# Patient Record
Sex: FEMALE | Race: WHITE | Employment: UNEMPLOYED | URBAN - METROPOLITAN AREA
[De-identification: names, ages, dates, MRNs, and addresses within clinical notes are randomized per-mention and may not be internally consistent; named-entity substitution may affect disease eponyms.]

---

## 2017-08-18 ENCOUNTER — HOSPITAL ENCOUNTER (EMERGENCY)
Age: 27
Discharge: HOME | End: 2017-08-18
Attending: EMERGENCY MEDICINE
Payer: COMMERCIAL

## 2017-08-18 ENCOUNTER — APPOINTMENT (OUTPATIENT)
Dept: GENERAL RADIOLOGY | Age: 27
End: 2017-08-18
Attending: EMERGENCY MEDICINE
Payer: COMMERCIAL

## 2017-08-18 VITALS
SYSTOLIC BLOOD PRESSURE: 144 MMHG | TEMPERATURE: 98.8 F | RESPIRATION RATE: 37 BRPM | WEIGHT: 273 LBS | BODY MASS INDEX: 42.85 KG/M2 | OXYGEN SATURATION: 98 % | DIASTOLIC BLOOD PRESSURE: 82 MMHG | HEART RATE: 66 BPM | HEIGHT: 67 IN

## 2017-08-18 DIAGNOSIS — R68.89 SOMATIC COMPLAINTS, MULTIPLE: Primary | ICD-10-CM

## 2017-08-18 LAB
ALBUMIN SERPL-MCNC: 3.3 G/DL (ref 3.5–5)
ALBUMIN/GLOB SERPL: 0.7 {RATIO} (ref 1.2–3.5)
ALP SERPL-CCNC: 118 U/L (ref 50–136)
ALT SERPL-CCNC: 19 U/L (ref 12–65)
ANION GAP SERPL CALC-SCNC: 11 MMOL/L (ref 7–16)
AST SERPL-CCNC: 14 U/L (ref 15–37)
BASOPHILS # BLD: 0 K/UL (ref 0–0.2)
BASOPHILS NFR BLD: 0 % (ref 0–2)
BILIRUB SERPL-MCNC: 0.2 MG/DL (ref 0.2–1.1)
BUN SERPL-MCNC: 10 MG/DL (ref 6–23)
CALCIUM SERPL-MCNC: 9.2 MG/DL (ref 8.3–10.4)
CHLORIDE SERPL-SCNC: 105 MMOL/L (ref 98–107)
CO2 SERPL-SCNC: 26 MMOL/L (ref 21–32)
CREAT SERPL-MCNC: 0.68 MG/DL (ref 0.6–1)
DIFFERENTIAL METHOD BLD: NORMAL
EOSINOPHIL # BLD: 0.1 K/UL (ref 0–0.8)
EOSINOPHIL NFR BLD: 1 % (ref 0.5–7.8)
ERYTHROCYTE [DISTWIDTH] IN BLOOD BY AUTOMATED COUNT: 12.6 % (ref 11.9–14.6)
GLOBULIN SER CALC-MCNC: 4.6 G/DL (ref 2.3–3.5)
GLUCOSE SERPL-MCNC: 93 MG/DL (ref 65–100)
HCG UR QL: NEGATIVE
HCT VFR BLD AUTO: 40.5 % (ref 35.8–46.3)
HGB BLD-MCNC: 13.5 G/DL (ref 11.7–15.4)
IMM GRANULOCYTES # BLD: 0 K/UL (ref 0–0.5)
IMM GRANULOCYTES NFR BLD: 0.2 % (ref 0–5)
LIPASE SERPL-CCNC: 126 U/L (ref 73–393)
LYMPHOCYTES # BLD: 2.6 K/UL (ref 0.5–4.6)
LYMPHOCYTES NFR BLD: 32 % (ref 13–44)
MCH RBC QN AUTO: 28.7 PG (ref 26.1–32.9)
MCHC RBC AUTO-ENTMCNC: 33.3 G/DL (ref 31.4–35)
MCV RBC AUTO: 86.2 FL (ref 79.6–97.8)
MONOCYTES # BLD: 0.7 K/UL (ref 0.1–1.3)
MONOCYTES NFR BLD: 9 % (ref 4–12)
NEUTS SEG # BLD: 4.7 K/UL (ref 1.7–8.2)
NEUTS SEG NFR BLD: 58 % (ref 43–78)
PLATELET # BLD AUTO: 330 K/UL (ref 150–450)
PMV BLD AUTO: 10.8 FL (ref 10.8–14.1)
POTASSIUM SERPL-SCNC: 3.9 MMOL/L (ref 3.5–5.1)
PROT SERPL-MCNC: 7.9 G/DL (ref 6.3–8.2)
RBC # BLD AUTO: 4.7 M/UL (ref 4.05–5.25)
SODIUM SERPL-SCNC: 142 MMOL/L (ref 136–145)
WBC # BLD AUTO: 8 K/UL (ref 4.3–11.1)

## 2017-08-18 PROCEDURE — 83690 ASSAY OF LIPASE: CPT | Performed by: EMERGENCY MEDICINE

## 2017-08-18 PROCEDURE — 81025 URINE PREGNANCY TEST: CPT

## 2017-08-18 PROCEDURE — 99285 EMERGENCY DEPT VISIT HI MDM: CPT | Performed by: EMERGENCY MEDICINE

## 2017-08-18 PROCEDURE — 74011250636 HC RX REV CODE- 250/636: Performed by: EMERGENCY MEDICINE

## 2017-08-18 PROCEDURE — 81003 URINALYSIS AUTO W/O SCOPE: CPT | Performed by: EMERGENCY MEDICINE

## 2017-08-18 PROCEDURE — 96375 TX/PRO/DX INJ NEW DRUG ADDON: CPT | Performed by: EMERGENCY MEDICINE

## 2017-08-18 PROCEDURE — 80053 COMPREHEN METABOLIC PANEL: CPT | Performed by: EMERGENCY MEDICINE

## 2017-08-18 PROCEDURE — 96361 HYDRATE IV INFUSION ADD-ON: CPT | Performed by: EMERGENCY MEDICINE

## 2017-08-18 PROCEDURE — 96374 THER/PROPH/DIAG INJ IV PUSH: CPT | Performed by: EMERGENCY MEDICINE

## 2017-08-18 PROCEDURE — 85025 COMPLETE CBC W/AUTO DIFF WBC: CPT | Performed by: EMERGENCY MEDICINE

## 2017-08-18 PROCEDURE — 71020 XR CHEST PA LAT: CPT

## 2017-08-18 PROCEDURE — 93005 ELECTROCARDIOGRAM TRACING: CPT | Performed by: EMERGENCY MEDICINE

## 2017-08-18 RX ORDER — SODIUM CHLORIDE 9 MG/ML
1000 INJECTION, SOLUTION INTRAVENOUS ONCE
Status: COMPLETED | OUTPATIENT
Start: 2017-08-18 | End: 2017-08-18

## 2017-08-18 RX ORDER — KETOROLAC TROMETHAMINE 30 MG/ML
15 INJECTION, SOLUTION INTRAMUSCULAR; INTRAVENOUS
Status: COMPLETED | OUTPATIENT
Start: 2017-08-18 | End: 2017-08-18

## 2017-08-18 RX ORDER — LORAZEPAM 2 MG/ML
0.5 INJECTION INTRAMUSCULAR
Status: DISCONTINUED | OUTPATIENT
Start: 2017-08-18 | End: 2017-08-18 | Stop reason: HOSPADM

## 2017-08-18 RX ORDER — LORAZEPAM 0.5 MG/1
0.5 TABLET ORAL
Qty: 12 TAB | Refills: 0 | Status: SHIPPED | OUTPATIENT
Start: 2017-08-18 | End: 2018-01-23

## 2017-08-18 RX ORDER — DIPHENHYDRAMINE HYDROCHLORIDE 50 MG/ML
12.5 INJECTION, SOLUTION INTRAMUSCULAR; INTRAVENOUS
Status: COMPLETED | OUTPATIENT
Start: 2017-08-18 | End: 2017-08-18

## 2017-08-18 RX ORDER — PROCHLORPERAZINE EDISYLATE 5 MG/ML
10 INJECTION INTRAMUSCULAR; INTRAVENOUS
Status: COMPLETED | OUTPATIENT
Start: 2017-08-18 | End: 2017-08-18

## 2017-08-18 RX ADMIN — DIPHENHYDRAMINE HYDROCHLORIDE 12.5 MG: 50 INJECTION, SOLUTION INTRAMUSCULAR; INTRAVENOUS at 17:29

## 2017-08-18 RX ADMIN — KETOROLAC TROMETHAMINE 15 MG: 30 INJECTION, SOLUTION INTRAMUSCULAR at 17:29

## 2017-08-18 RX ADMIN — PROCHLORPERAZINE EDISYLATE 10 MG: 5 INJECTION INTRAMUSCULAR; INTRAVENOUS at 17:29

## 2017-08-18 RX ADMIN — SODIUM CHLORIDE 1000 ML: 900 INJECTION, SOLUTION INTRAVENOUS at 17:18

## 2017-08-18 NOTE — ED PROVIDER NOTES
Patient is a 32 y.o. female presenting with abdominal pain. The history is provided by the patient and a relative. Abdominal Pain    This is a new problem. The current episode started more than 1 week ago (>1 month off/on). The problem occurs daily. The problem has not changed since onset. Pain location: upper abdomen, chest and head (not all at same time) The quality of the pain is aching, dull and sharp. The pain is at a severity of 9/10. The pain is severe. Associated symptoms include diarrhea and nausea. Pertinent negatives include no anorexia, no fever, no frequency, no hematuria, no headaches, no arthralgias, no myalgias, no trauma, no chest pain and no back pain. Nothing worsens the pain. The pain is relieved by NSAIDs. The patient's surgical history includes cholecystectomy. Past Medical History:   Diagnosis Date    UTI (urinary tract infection)        No past surgical history on file. Family History:   Problem Relation Age of Onset    Cancer Mother      breast    Kidney Disease Mother      stones    Breast Cancer Mother     Diabetes Father     Arthritis-osteo Father     Thyroid Disease Maternal Grandmother        Social History     Social History    Marital status:      Spouse name: N/A    Number of children: N/A    Years of education: N/A     Occupational History    Not on file. Social History Main Topics    Smoking status: Current Every Day Smoker     Packs/day: 0.50     Types: Cigarettes    Smokeless tobacco: Never Used    Alcohol use No      Comment: occ    Drug use: No    Sexual activity: Yes     Partners: Male     Birth control/ protection: Pill     Other Topics Concern    Not on file     Social History Narrative         ALLERGIES: Latex and Metronidazole    Review of Systems   Constitutional: Negative. Negative for chills and fever. HENT: Negative. Negative for congestion, ear pain, postnasal drip and rhinorrhea.     Eyes: Negative for pain and visual disturbance. Respiratory: Negative for cough and wheezing. Cardiovascular: Negative for chest pain and leg swelling. Gastrointestinal: Positive for abdominal pain, diarrhea and nausea. Negative for abdominal distention and anorexia. Endocrine: Negative. Negative for polydipsia, polyphagia and polyuria. Genitourinary: Negative. Negative for difficulty urinating, flank pain, frequency and hematuria. Musculoskeletal: Negative. Negative for arthralgias, back pain and myalgias. Skin: Negative. Neurological: Negative. Negative for dizziness and headaches. Hematological: Negative. Vitals:    08/18/17 1608   BP: 133/83   Pulse: 96   Resp: 20   Temp: 98.8 °F (37.1 °C)   SpO2: 98%   Weight: 123.8 kg (273 lb)   Height: 5' 7\" (1.702 m)            Physical Exam   Constitutional: She is oriented to person, place, and time. She appears well-developed and well-nourished. Non-toxic appearance. She does not have a sickly appearance. She does not appear ill. No distress. HENT:   Head: Normocephalic and atraumatic. Cardiovascular: Intact distal pulses. Pulmonary/Chest: Effort normal.   Abdominal: Soft. Neurological: She is alert and oriented to person, place, and time. Skin: Skin is warm and dry. Psychiatric: She has a normal mood and affect. Her behavior is normal.   Nursing note and vitals reviewed. MDM  Number of Diagnoses or Management Options  Somatic complaints, multiple: new and requires workup  Diagnosis management comments: Screening studies unremarkable. Patient appears comfortable at this time. Symptoms present for weeks and stable. Suggest outpatient follow up with PCP and can continue \"work up\". Return to ER PRN for any acute changes and/or concerns. I discussed the results of all labs, procedures, radiographs, and treatments with the patient and available family. Treatment plan is agreed upon and the patient is ready for discharge.   Questions about treatment in the ED and differential diagnosis of presenting condition were answered. Patient was given verbal discharge instructions including, but not limited to, importance of returning to the emergency department for any concern of worsening or continued symptoms. Instructions were given to follow up with a primary care provider or specialist within 1-2 days. Adverse effects of medications, if prescribed, were discussed and patient was advised to refrain from significant physical activity until followed up by primary care physician and to not drive or operate heavy machinery after taking any sedating substances.            Amount and/or Complexity of Data Reviewed  Clinical lab tests: ordered and reviewed  Tests in the radiology section of CPT®: ordered and reviewed      ED Course       Procedures

## 2017-08-18 NOTE — ED TRIAGE NOTES
Patient c/o \"shooting, cramping\" LUQ pain daily with nausea x 1 month but reports it has been constant x 1 week. Patient c/o diarrhea x 6 months. Patient denies vomiting. Patient c/o migraine headaches daily x 3 weeks. Patient states she has intermittent right sided chest pain \"randomly throughout the day\" x 2 weeks. Patient states the right sided chest pain improves with Ibuprofen.

## 2017-08-19 LAB
ATRIAL RATE: 63 BPM
CALCULATED P AXIS, ECG09: 29 DEGREES
CALCULATED R AXIS, ECG10: 57 DEGREES
CALCULATED T AXIS, ECG11: 37 DEGREES
DIAGNOSIS, 93000: NORMAL
P-R INTERVAL, ECG05: 142 MS
Q-T INTERVAL, ECG07: 396 MS
QRS DURATION, ECG06: 84 MS
QTC CALCULATION (BEZET), ECG08: 405 MS
VENTRICULAR RATE, ECG03: 63 BPM

## 2018-12-11 ENCOUNTER — APPOINTMENT (OUTPATIENT)
Dept: GENERAL RADIOLOGY | Age: 28
End: 2018-12-11
Attending: EMERGENCY MEDICINE
Payer: OTHER MISCELLANEOUS

## 2018-12-11 ENCOUNTER — HOSPITAL ENCOUNTER (EMERGENCY)
Age: 28
Discharge: HOME | End: 2018-12-11
Attending: EMERGENCY MEDICINE
Payer: OTHER MISCELLANEOUS

## 2018-12-11 VITALS
SYSTOLIC BLOOD PRESSURE: 128 MMHG | RESPIRATION RATE: 16 BRPM | HEART RATE: 86 BPM | OXYGEN SATURATION: 98 % | TEMPERATURE: 98.8 F | DIASTOLIC BLOOD PRESSURE: 78 MMHG

## 2018-12-11 DIAGNOSIS — S82.61XA CLOSED FRACTURE OF DISTAL LATERAL MALLEOLUS OF RIGHT FIBULA, INITIAL ENCOUNTER: ICD-10-CM
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
## 2018-12-11 PROCEDURE — 96374 THER/PROPH/DIAG INJ IV PUSH: CPT | Performed by: NURSE PRACTITIONER

## 2018-12-11 PROCEDURE — 73610 X-RAY EXAM OF ANKLE: CPT

## 2018-12-11 PROCEDURE — 77030008299 HC SPLNT FBRGLS SCTCH 3M -B

## 2018-12-11 PROCEDURE — 77030019945 HC PDNG CST 3M -A

## 2018-12-11 PROCEDURE — 99284 EMERGENCY DEPT VISIT MOD MDM: CPT | Performed by: NURSE PRACTITIONER

## 2018-12-11 PROCEDURE — 75810000053 HC SPLINT APPLICATION: Performed by: NURSE PRACTITIONER

## 2018-12-11 PROCEDURE — 74011250636 HC RX REV CODE- 250/636: Performed by: NURSE PRACTITIONER

## 2018-12-11 RX ORDER — MORPHINE SULFATE 2 MG/ML
4 INJECTION, SOLUTION INTRAMUSCULAR; INTRAVENOUS
Status: COMPLETED | OUTPATIENT
Start: 2018-12-11 | End: 2018-12-11

## 2018-12-11 RX ORDER — HYDROCODONE BITARTRATE AND ACETAMINOPHEN 5; 325 MG/1; MG/1
1 TABLET ORAL
Qty: 20 TAB | Refills: 0 | Status: SHIPPED | OUTPATIENT
Start: 2018-12-11 | End: 2019-02-25

## 2018-12-11 RX ADMIN — MORPHINE SULFATE 4 MG: 2 INJECTION, SOLUTION INTRAMUSCULAR; INTRAVENOUS at 15:09

## 2018-12-11 NOTE — ED TRIAGE NOTES
Pt tripped and fell today and injured right ankle, heard a pop. Pt denies hitting head. Arrives via EMS from work.  Given 15mg toradol IV

## 2018-12-11 NOTE — LETTER
[address] [facility] EMERGENCY DEPT One [address] [id number] [phone] Work/School Note Date: 12/11/2018 To Whom It May concern: 
 
[address] was seen and treated today in the emergency room by the following provider(s): 
Attending Provider: Juan Carlos Laboy MD 
Nurse Practitioner: JESUS Newman. [address] was seen in the Emergency Department 12/11/2018. Patient has to be non weight bearing until evaluated by orthopedics.   
 
Sincerely, 
 
 
 
 
JESUS Davenport

## 2018-12-11 NOTE — ED PROVIDER NOTES
Patient presents with ankle pain and swelling after a fall today while at work. She states she tripped over an uneven walk way. The history is provided by the patient. Ankle Pain    This is a new problem. The current episode started 1 to 2 hours ago. The problem occurs constantly. The problem has not changed since onset. The pain is present in the right ankle. The quality of the pain is described as pounding. The pain is at a severity of 8/10. The pain is moderate. Pertinent negatives include no numbness, no stiffness, no tingling, no itching, no back pain and no neck pain. The symptoms are aggravated by movement. She has tried nothing for the symptoms. There has been a history of trauma.         Past Medical History:   Diagnosis Date    Anxiety     Depression     UTI (urinary tract infection)        Past Surgical History:   Procedure Laterality Date    HX CHOLECYSTECTOMY  2016         Family History:   Problem Relation Age of Onset    Cancer Mother         breast    Kidney Disease Mother         stones    Breast Cancer Mother     Diabetes Father     Arthritis-osteo Father     Thyroid Disease Maternal Grandmother        Social History     Socioeconomic History    Marital status:      Spouse name: Not on file    Number of children: Not on file    Years of education: Not on file    Highest education level: Not on file   Social Needs    Financial resource strain: Not on file    Food insecurity - worry: Not on file    Food insecurity - inability: Not on file   [place] needs - medical: Not on file   [place] needs - non-medical: Not on file   Occupational History    Not on file   Tobacco Use    Smoking status: Current Every Day Smoker     Packs/day: 0.50     Types: Cigarettes    Smokeless tobacco: Never Used   Substance and Sexual Activity    Alcohol use: No     Alcohol/week: 0.0 oz     Comment: occ    Drug use: No    Sexual activity: Yes     Partners: Male     Birth control/protection: Pill   Other Topics Concern    Not on file   Social History Narrative    Not on file         ALLERGIES: Latex; Augmentin [amoxicillin-pot clavulanate]; and Metronidazole    Review of Systems   Musculoskeletal: Positive for arthralgias and joint swelling. Negative for back pain, neck pain and stiffness. Skin: Negative for itching. Neurological: Negative for tingling and numbness. Vitals:    12/11/18 1404   BP: 128/78   Pulse: 100   Resp: 18   Temp: 98.8 °F (37.1 °C)   SpO2: 98%            Physical Exam   Constitutional: She is oriented to person, place, and time. She appears well-developed and well-nourished. No distress. Cardiovascular: Normal rate and regular rhythm. Pulmonary/Chest: Effort normal and breath sounds normal.   Musculoskeletal:        Right knee: Normal.        Right ankle: She exhibits swelling. She exhibits normal pulse. Tenderness. Lateral malleolus tenderness found. Right foot: Normal.   Neurological: She is alert and oriented to person, place, and time. Skin: Skin is warm and dry. She is not diaphoretic. Psychiatric: She has a normal mood and affect. Her behavior is normal.   Nursing note and vitals reviewed. Xr Ankle Rt Min 3 V    Result Date: 12/11/2018  Right ankle  12/11/2018 2:42 PM Indication: Fall today, twisting injury with ankle pain Comparison: None available at this hospital PACS Findings: AP, Lateral, Oblique views are submitted. Bone density is normal. There is soft tissue swelling, best seen laterally. There is a minimally displaced oblique fracture centered at the base of the lateral malleolus. Ankle mortise otherwise appears intact. No other fracture identified. Impression: Minimally displaced oblique fracture at the base of the lateral malleolus with soft tissue swelling.      MDM  Number of Diagnoses or Management Options  Closed fracture of distal lateral malleolus of right fibula, initial encounter:   Fall, initial encounter:   Diagnosis management comments: Splint applied. Patient given total of 4mg of morphine while in the department. Prescription for norco.Discussed patient with . Patient referred to orthopedics. Amount and/or Complexity of Data Reviewed  Tests in the radiology section of CPT®: ordered and reviewed  Discuss the patient with other providers: yes (Zoe)    Patient Progress  Patient progress: stable         Splint, Laxmileonel  Date/Time: 12/11/2018 3:44 PM  Performed by: JESUS Araya  Authorized by: JESUS Araya     Consent:     Consent obtained:  Verbal    Consent given by:  Patient    Risks discussed:  Discoloration, numbness, pain and swelling    Alternatives discussed:  No treatment  Pre-procedure details:     Sensation:  Normal    Skin color:  Appropriate for ethnicity  Procedure details:     Laterality:  Right    Location:  Ankle    Ankle:  R ankle    Strapping: no      Splint type:  Sugar tong (with posterior)    Supplies:  Cotton padding, Ortho-Glass and elastic bandage  Post-procedure details:     Pain:  Unchanged    Sensation:  Normal    Skin color:  Appropriate for ethnicity    Patient tolerance of procedure:   Tolerated well, no immediate complications

## 2018-12-11 NOTE — ED NOTES
I have reviewed discharge instructions with the patient. The patient verbalized understanding. Patient left ED via Discharge Method: wheelchair to Home with . Opportunity for questions and clarification provided. Patient given 1 scripts. To continue your aftercare when you leave the hospital, you may receive an automated call from our care team to check in on how you are doing. This is a free service and part of our promise to provide the best care and service to meet your aftercare needs.  If you have questions, or wish to unsubscribe from this service please call [phone]. Thank you for Choosing our [facility] Emergency Department.

## 2018-12-11 NOTE — DISCHARGE INSTRUCTIONS
Norco as prescribed for pain. Call orthopedics today to schedule a follow up appointment with one of the ankle/foot specialist.   Use your crutches and do not put any weight on your right foot. Elevate and ice your foot. Return to the Emergency Department for any new or worse symptoms.

## 2019-02-25 ENCOUNTER — HOSPITAL ENCOUNTER (EMERGENCY)
Age: 29
Discharge: HOME | End: 2019-02-25
Attending: EMERGENCY MEDICINE
Payer: SELF-PAY

## 2019-02-25 ENCOUNTER — APPOINTMENT (OUTPATIENT)
Dept: CT IMAGING | Age: 29
End: 2019-02-25
Attending: EMERGENCY MEDICINE
Payer: SELF-PAY

## 2019-02-25 VITALS
RESPIRATION RATE: 16 BRPM | WEIGHT: 293 LBS | OXYGEN SATURATION: 100 % | BODY MASS INDEX: 45.99 KG/M2 | SYSTOLIC BLOOD PRESSURE: 114 MMHG | DIASTOLIC BLOOD PRESSURE: 79 MMHG | TEMPERATURE: 98.3 F | HEART RATE: 89 BPM | HEIGHT: 67 IN

## 2019-02-25 DIAGNOSIS — R10.84 ABDOMINAL PAIN, GENERALIZED: Primary | ICD-10-CM

## 2019-02-25 LAB
ALBUMIN SERPL-MCNC: 3.5 G/DL (ref 3.5–5)
ALBUMIN/GLOB SERPL: 0.8 {RATIO} (ref 1.2–3.5)
ALP SERPL-CCNC: 113 U/L (ref 50–136)
ALT SERPL-CCNC: 24 U/L (ref 12–65)
ANION GAP SERPL CALC-SCNC: 9 MMOL/L (ref 7–16)
AST SERPL-CCNC: 22 U/L (ref 15–37)
BASOPHILS # BLD: 0 K/UL (ref 0–0.2)
BASOPHILS NFR BLD: 0 % (ref 0–2)
BILIRUB SERPL-MCNC: 0.3 MG/DL (ref 0.2–1.1)
BUN SERPL-MCNC: 14 MG/DL (ref 6–23)
CALCIUM SERPL-MCNC: 9 MG/DL (ref 8.3–10.4)
CHLORIDE SERPL-SCNC: 106 MMOL/L (ref 98–107)
CO2 SERPL-SCNC: 23 MMOL/L (ref 21–32)
CREAT SERPL-MCNC: 0.74 MG/DL (ref 0.6–1)
DIFFERENTIAL METHOD BLD: ABNORMAL
EOSINOPHIL # BLD: 0.2 K/UL (ref 0–0.8)
EOSINOPHIL NFR BLD: 1 % (ref 0.5–7.8)
ERYTHROCYTE [DISTWIDTH] IN BLOOD BY AUTOMATED COUNT: 12.8 % (ref 11.9–14.6)
GLOBULIN SER CALC-MCNC: 4.3 G/DL (ref 2.3–3.5)
GLUCOSE SERPL-MCNC: 88 MG/DL (ref 65–100)
HCT VFR BLD AUTO: 41.1 % (ref 35.8–46.3)
HGB BLD-MCNC: 13.4 G/DL (ref 11.7–15.4)
IMM GRANULOCYTES # BLD AUTO: 0.1 K/UL (ref 0–0.5)
IMM GRANULOCYTES NFR BLD AUTO: 0 % (ref 0–5)
LIPASE SERPL-CCNC: 138 U/L (ref 73–393)
LYMPHOCYTES # BLD: 3.4 K/UL (ref 0.5–4.6)
LYMPHOCYTES NFR BLD: 28 % (ref 13–44)
MCH RBC QN AUTO: 28.9 PG (ref 26.1–32.9)
MCHC RBC AUTO-ENTMCNC: 32.6 G/DL (ref 31.4–35)
MCV RBC AUTO: 88.8 FL (ref 79.6–97.8)
MONOCYTES # BLD: 1.1 K/UL (ref 0.1–1.3)
MONOCYTES NFR BLD: 9 % (ref 4–12)
NEUTS SEG # BLD: 7.4 K/UL (ref 1.7–8.2)
NEUTS SEG NFR BLD: 61 % (ref 43–78)
NRBC # BLD: 0 K/UL (ref 0–0.2)
PLATELET # BLD AUTO: 336 K/UL (ref 150–450)
PMV BLD AUTO: 10.6 FL (ref 9.4–12.3)
POTASSIUM SERPL-SCNC: 3.8 MMOL/L (ref 3.5–5.1)
PROT SERPL-MCNC: 7.8 G/DL (ref 6.3–8.2)
RBC # BLD AUTO: 4.63 M/UL (ref 4.05–5.2)
SODIUM SERPL-SCNC: 138 MMOL/L (ref 136–145)
WBC # BLD AUTO: 12.1 K/UL (ref 4.3–11.1)

## 2019-02-25 PROCEDURE — 80053 COMPREHEN METABOLIC PANEL: CPT

## 2019-02-25 PROCEDURE — 99284 EMERGENCY DEPT VISIT MOD MDM: CPT | Performed by: EMERGENCY MEDICINE

## 2019-02-25 PROCEDURE — 96375 TX/PRO/DX INJ NEW DRUG ADDON: CPT | Performed by: EMERGENCY MEDICINE

## 2019-02-25 PROCEDURE — 96374 THER/PROPH/DIAG INJ IV PUSH: CPT | Performed by: EMERGENCY MEDICINE

## 2019-02-25 PROCEDURE — 81003 URINALYSIS AUTO W/O SCOPE: CPT | Performed by: EMERGENCY MEDICINE

## 2019-02-25 PROCEDURE — 74177 CT ABD & PELVIS W/CONTRAST: CPT

## 2019-02-25 PROCEDURE — 74011250636 HC RX REV CODE- 250/636: Performed by: EMERGENCY MEDICINE

## 2019-02-25 PROCEDURE — 74011000258 HC RX REV CODE- 258: Performed by: EMERGENCY MEDICINE

## 2019-02-25 PROCEDURE — 85025 COMPLETE CBC W/AUTO DIFF WBC: CPT

## 2019-02-25 PROCEDURE — 83690 ASSAY OF LIPASE: CPT

## 2019-02-25 PROCEDURE — 74011636320 HC RX REV CODE- 636/320: Performed by: EMERGENCY MEDICINE

## 2019-02-25 RX ORDER — TRAMADOL HYDROCHLORIDE 50 MG/1
50 TABLET ORAL
COMMUNITY
End: 2019-04-17

## 2019-02-25 RX ORDER — KETOROLAC TROMETHAMINE 30 MG/ML
30 INJECTION, SOLUTION INTRAMUSCULAR; INTRAVENOUS
Status: COMPLETED | OUTPATIENT
Start: 2019-02-25 | End: 2019-02-25

## 2019-02-25 RX ORDER — ONDANSETRON 2 MG/ML
4 INJECTION INTRAMUSCULAR; INTRAVENOUS
Status: COMPLETED | OUTPATIENT
Start: 2019-02-25 | End: 2019-02-25

## 2019-02-25 RX ORDER — DICLOFENAC POTASSIUM 50 MG/1
50 TABLET, FILM COATED ORAL 3 TIMES DAILY
Qty: 15 TAB | Refills: 0 | Status: SHIPPED | OUTPATIENT
Start: 2019-02-25 | End: 2019-04-03

## 2019-02-25 RX ORDER — SODIUM CHLORIDE 0.9 % (FLUSH) 0.9 %
10 SYRINGE (ML) INJECTION
Status: COMPLETED | OUTPATIENT
Start: 2019-02-25 | End: 2019-02-25

## 2019-02-25 RX ADMIN — ONDANSETRON 4 MG: 2 INJECTION INTRAMUSCULAR; INTRAVENOUS at 19:40

## 2019-02-25 RX ADMIN — SODIUM CHLORIDE 100 ML: 900 INJECTION, SOLUTION INTRAVENOUS at 19:18

## 2019-02-25 RX ADMIN — KETOROLAC TROMETHAMINE 30 MG: 30 INJECTION, SOLUTION INTRAMUSCULAR at 19:40

## 2019-02-25 RX ADMIN — IOPAMIDOL 100 ML: 755 INJECTION, SOLUTION INTRAVENOUS at 19:18

## 2019-02-25 RX ADMIN — SODIUM CHLORIDE 1000 ML: 900 INJECTION, SOLUTION INTRAVENOUS at 19:40

## 2019-02-25 RX ADMIN — Medication 10 ML: at 19:18

## 2019-02-25 NOTE — ED PROVIDER NOTES
Patient presents with right flank pain and right lower quadrant abdominal pain starting intermittent yesterday but constant today. Getting worse so came in. Has some nausea and some diarrhea but no vomiting. No dysuria hematuria. No vaginal bleeding or discharge. The history is provided by the patient. No  was used. Abdominal Pain This is a new problem. The current episode started yesterday. The problem occurs constantly. The problem has been gradually worsening. The pain is associated with an unknown factor. The pain is located in the RLQ. The pain is moderate. Associated symptoms include diarrhea and nausea. Pertinent negatives include no fever, no melena, no vomiting, no constipation, no dysuria, no hematuria, no headaches, no chest pain and no back pain. The pain is worsened by palpation. The pain is relieved by nothing. The patient's surgical history includes cholecystectomy. Past Medical History:  
Diagnosis Date  Anxiety  Depression  UTI (urinary tract infection) Past Surgical History:  
Procedure Laterality Date  HX CHOLECYSTECTOMY  2016 Family History:  
Problem Relation Age of Onset  Cancer Mother   
     breast  
 Kidney Disease Mother   
     stones  Breast Cancer Mother  Diabetes Father  Arthritis-osteo Father  Thyroid Disease Maternal Grandmother Social History Socioeconomic History  Marital status:  Spouse name: Not on file  Number of children: Not on file  Years of education: Not on file  Highest education level: Not on file Social Needs  Financial resource strain: Not on file  Food insecurity - worry: Not on file  Food insecurity - inability: Not on file  Transportation needs - medical: Not on file  Transportation needs - non-medical: Not on file Occupational History  Not on file Tobacco Use  Smoking status: Current Every Day Smoker Packs/day: 0.50 Types: Cigarettes  Smokeless tobacco: Never Used Substance and Sexual Activity  Alcohol use: No  
  Alcohol/week: 0.0 oz  
  Comment: occ  Drug use: No  
 Sexual activity: Yes  
  Partners: Male Birth control/protection: Pill Other Topics Concern  Not on file Social History Narrative  Not on file ALLERGIES: Latex; Augmentin [amoxicillin-pot clavulanate]; and Metronidazole Review of Systems Constitutional: Negative for chills and fever. HENT: Negative for rhinorrhea and sore throat. Eyes: Negative for pain and redness. Respiratory: Negative for chest tightness, shortness of breath and wheezing. Cardiovascular: Negative for chest pain and leg swelling. Gastrointestinal: Positive for abdominal pain, diarrhea and nausea. Negative for constipation, melena and vomiting. Genitourinary: Negative for dysuria, hematuria, vaginal bleeding and vaginal discharge. Musculoskeletal: Negative for back pain, gait problem, neck pain and neck stiffness. Skin: Negative for color change and rash. Neurological: Negative for weakness, numbness and headaches. Vitals:  
 02/25/19 1708 BP: 161/69 Pulse: (!) 116 Resp: 20 Temp: 98.4 °F (36.9 °C) SpO2: 98% Weight: 147.4 kg (325 lb) Height: 5' 7\" (1.702 m) Physical Exam  
Constitutional: She is oriented to person, place, and time. She appears well-developed and well-nourished. HENT:  
Head: Normocephalic and atraumatic. Neck: Normal range of motion. Neck supple. Cardiovascular: Regular rhythm. Tachycardia present. Pulmonary/Chest: Effort normal and breath sounds normal.  
Abdominal: Soft. Bowel sounds are normal. There is tenderness (RLQ and right flank). There is guarding. Musculoskeletal: Normal range of motion. She exhibits no edema. Neurological: She is alert and oriented to person, place, and time. Skin: Skin is warm and dry.   
  
 
MDM 
 Number of Diagnoses or Management Options Diagnosis management comments: Patient with abdominal pain but no acute on CAT scan. We will have her follow up with her OB/GYN and go from there. Amount and/or Complexity of Data Reviewed Clinical lab tests: ordered and reviewed Tests in the radiology section of CPT®: ordered and reviewed Tests in the medicine section of CPT®: ordered and reviewed Patient Progress Patient progress: stable Procedures Results Include: 
 
Recent Results (from the past 24 hour(s)) CBC WITH AUTOMATED DIFF Collection Time: 02/25/19  5:15 PM  
Result Value Ref Range WBC 12.1 (H) 4.3 - 11.1 K/uL  
 RBC 4.63 4.05 - 5.2 M/uL  
 HGB 13.4 11.7 - 15.4 g/dL HCT 41.1 35.8 - 46.3 % MCV 88.8 79.6 - 97.8 FL  
 MCH 28.9 26.1 - 32.9 PG  
 MCHC 32.6 31.4 - 35.0 g/dL  
 RDW 12.8 11.9 - 14.6 % PLATELET 209 133 - 862 K/uL MPV 10.6 9.4 - 12.3 FL ABSOLUTE NRBC 0.00 0.0 - 0.2 K/uL  
 DF AUTOMATED NEUTROPHILS 61 43 - 78 % LYMPHOCYTES 28 13 - 44 % MONOCYTES 9 4.0 - 12.0 % EOSINOPHILS 1 0.5 - 7.8 % BASOPHILS 0 0.0 - 2.0 % IMMATURE GRANULOCYTES 0 0.0 - 5.0 %  
 ABS. NEUTROPHILS 7.4 1.7 - 8.2 K/UL  
 ABS. LYMPHOCYTES 3.4 0.5 - 4.6 K/UL  
 ABS. MONOCYTES 1.1 0.1 - 1.3 K/UL  
 ABS. EOSINOPHILS 0.2 0.0 - 0.8 K/UL  
 ABS. BASOPHILS 0.0 0.0 - 0.2 K/UL  
 ABS. IMM. GRANS. 0.1 0.0 - 0.5 K/UL METABOLIC PANEL, COMPREHENSIVE Collection Time: 02/25/19  5:15 PM  
Result Value Ref Range Sodium 138 136 - 145 mmol/L Potassium 3.8 3.5 - 5.1 mmol/L Chloride 106 98 - 107 mmol/L  
 CO2 23 21 - 32 mmol/L Anion gap 9 7 - 16 mmol/L Glucose 88 65 - 100 mg/dL BUN 14 6 - 23 MG/DL Creatinine 0.74 0.6 - 1.0 MG/DL  
 GFR est AA >60 >60 ml/min/1.73m2 GFR est non-AA >60 >60 ml/min/1.73m2 Calcium 9.0 8.3 - 10.4 MG/DL  Bilirubin, total 0.3 0.2 - 1.1 MG/DL  
 ALT (SGPT) 24 12 - 65 U/L  
 AST (SGOT) 22 15 - 37 U/L  
 Alk. phosphatase 113 50 - 136 U/L Protein, total 7.8 6.3 - 8.2 g/dL Albumin 3.5 3.5 - 5.0 g/dL Globulin 4.3 (H) 2.3 - 3.5 g/dL A-G Ratio 0.8 (L) 1.2 - 3.5 LIPASE Collection Time: 02/25/19  5:15 PM  
Result Value Ref Range Lipase 138 73 - 393 U/L  
 
UA neg. CT ABD PELV W CONT (Final result) Result time 02/25/19 19:30:26 Final result by Will Alvarado MD (02/25/19 19:30:26) Impression:  
 IMPRESSION: 
Status post cholecystectomy. Normal appendix. No acute abnormality. Narrative:  
 HISTORY:  Abdominal pain, RLQ, which started yesterday. Nausea. COMPARISON: None EXAM: CT abdomen and pelvis with iv contrast 
 
TECHNIQUE:  
Thin section axial CT was performed from the lung bases through the symphysis 
pubis during uneventful rapid bolus intravenous administration of 125 mL of 
Isovue 370. Oral contrast was also administered. Radiation dose reduction 
techniques were used for this study.  Our CT scanners use one or all of the 
following: Automated exposure control, adjustment of the mA and/or kV according 
to patient size, use of iterative reconstruction. FINDINGS: 
 
The lung bases are clear. CT abdomen: The liver and spleen enhances homogeneously without discrete 
lesions. There is no biliary ductal dilatation. The patient is status post 
cholecystectomy. The pancreas and adrenal glands are normal. The kidneys enhance 
symmetrically. Bowel loops in the upper abdomen are normal. No definite upper 
abdominal lymphadenopathy seen. CT pelvis: The appendix is normal. The bladder and rectum are normal. No pelvic 
adenopathy seen. No free air or free fluid seen within the pelvis.   
 
Bone window evaluation demonstrates no aggressive osseous lesions.

## 2019-02-25 NOTE — ED TRIAGE NOTES
PT REPORTS RIGHT SIDED ABD PAIN THAT STARTED YESTERDAY - PT REPORTS THAT EVERY TIME SHE TURNS HER BODY TO THE RIGHT MAKES IT WORSE AS WELL AS COUGHING - PT REPORTS THAT TODAY THE PAIN HAS MOVED ACROSS HER STOMACH - PT REPORTS NAUSEA BUT HAS TAKEN PHENERGAN - NO VOMITING - PT REPORTS DIARRHEA ALL DAY TODAY - PT DENIES ANY URIANARY SYMPTOMS

## 2019-02-26 NOTE — DISCHARGE INSTRUCTIONS

## 2019-02-26 NOTE — ED NOTES
I have reviewed discharge instructions with the patient. The patient verbalized understanding. Patient left ED via Discharge Method: ambulatory to Home with self transport. The patient is ambulatory upon exit and appears in no acute distress. The patient has been provided discharge instructions, prescription, and follow up information. The patient and family do not have any questions at this time. Opportunity for questions and clarification provided. Patient given 1 scripts. To continue your aftercare when you leave the hospital, you may receive an automated call from our care team to check in on how you are doing. This is a free service and part of our promise to provide the best care and service to meet your aftercare needs.  If you have questions, or wish to unsubscribe from this service please call [phone]. Thank you for Choosing our [facility] Emergency Department.

## 2019-08-12 ENCOUNTER — HOSPITAL ENCOUNTER (OUTPATIENT)
Dept: GENERAL RADIOLOGY | Age: 29
Discharge: HOME | End: 2019-08-12
Attending: NURSE PRACTITIONER
Payer: COMMERCIAL

## 2019-08-12 DIAGNOSIS — R05.3 PERSISTENT COUGH: ICD-10-CM

## 2019-08-12 PROCEDURE — 71046 X-RAY EXAM CHEST 2 VIEWS: CPT

## 2020-02-25 PROBLEM — O09.891 MEDICATION EXPOSURE DURING FIRST TRIMESTER OF PREGNANCY: Status: ACTIVE | Noted: 2020-02-25

## 2020-02-25 PROBLEM — O99.331 TOBACCO SMOKING AFFECTING PREGNANCY IN FIRST TRIMESTER: Status: ACTIVE | Noted: 2020-02-25

## 2020-02-25 PROBLEM — O99.341 MENTAL DISORDER AFFECTING PREGNANCY IN FIRST TRIMESTER: Status: ACTIVE | Noted: 2020-02-25

## 2020-02-26 PROBLEM — O09.91 HIGH-RISK PREGNANCY IN FIRST TRIMESTER: Status: ACTIVE | Noted: 2020-02-26

## 2020-02-26 PROBLEM — Z36.82 NUCHAL TRANSLUCENCY OF FETUS ON PRENATAL ULTRASOUND: Status: ACTIVE | Noted: 2020-02-26

## 2020-02-26 PROBLEM — O99.211 OBESITY AFFECTING PREGNANCY IN FIRST TRIMESTER: Status: ACTIVE | Noted: 2020-02-26

## 2020-02-26 PROBLEM — O99.511: Status: ACTIVE | Noted: 2020-02-26

## 2020-04-15 PROBLEM — O09.899 RUBELLA NON-IMMUNE STATUS, ANTEPARTUM: Status: ACTIVE | Noted: 2020-04-15

## 2020-04-15 PROBLEM — Z36.82 NUCHAL TRANSLUCENCY OF FETUS ON PRENATAL ULTRASOUND: Status: RESOLVED | Noted: 2020-02-26 | Resolved: 2020-04-15

## 2020-04-15 PROBLEM — O99.342 MENTAL DISORDER AFFECTING PREGNANCY IN SECOND TRIMESTER: Status: ACTIVE | Noted: 2020-02-25

## 2020-04-15 PROBLEM — O09.92 HIGH-RISK PREGNANCY IN SECOND TRIMESTER: Status: ACTIVE | Noted: 2020-02-26

## 2020-04-15 PROBLEM — O10.912 CHRONIC HYPERTENSION COMPLICATING OR REASON FOR CARE DURING PREGNANCY, SECOND TRIMESTER: Status: ACTIVE | Noted: 2020-04-15

## 2020-04-15 PROBLEM — O99.512: Status: ACTIVE | Noted: 2020-02-26

## 2020-04-15 PROBLEM — O99.212 OBESITY AFFECTING PREGNANCY IN SECOND TRIMESTER: Status: ACTIVE | Noted: 2020-02-26

## 2020-04-15 PROBLEM — Z28.39 RUBELLA NON-IMMUNE STATUS, ANTEPARTUM: Status: ACTIVE | Noted: 2020-04-15

## 2020-05-19 PROBLEM — O99.330 MATERNAL TOBACCO USE, ANTEPARTUM: Status: ACTIVE | Noted: 2020-05-19

## 2020-05-27 ENCOUNTER — HOSPITAL ENCOUNTER (OUTPATIENT)
Dept: MAMMOGRAPHY | Age: 30
Discharge: HOME | End: 2020-05-27
Attending: OBSTETRICS & GYNECOLOGY
Payer: COMMERCIAL

## 2020-05-27 DIAGNOSIS — N64.4 BREAST PAIN, RIGHT: ICD-10-CM

## 2020-05-27 PROCEDURE — 76642 ULTRASOUND BREAST LIMITED: CPT

## 2020-06-17 PROBLEM — O99.613 CONSTIPATION DURING PREGNANCY IN THIRD TRIMESTER: Status: ACTIVE | Noted: 2020-06-17

## 2020-06-17 PROBLEM — O09.93 HIGH-RISK PREGNANCY IN THIRD TRIMESTER: Status: ACTIVE | Noted: 2020-02-26

## 2020-06-17 PROBLEM — O10.913 CHRONIC HYPERTENSION COMPLICATING OR REASON FOR CARE DURING PREGNANCY, THIRD TRIMESTER: Status: ACTIVE | Noted: 2020-04-15

## 2020-06-17 PROBLEM — O99.343 MENTAL DISORDER AFFECTING PREGNANCY IN THIRD TRIMESTER: Status: ACTIVE | Noted: 2020-02-25

## 2020-06-17 PROBLEM — K59.00 CONSTIPATION DURING PREGNANCY IN THIRD TRIMESTER: Status: ACTIVE | Noted: 2020-06-17

## 2020-06-17 PROBLEM — O99.213 OBESITY AFFECTING PREGNANCY IN THIRD TRIMESTER: Status: ACTIVE | Noted: 2020-02-26

## 2020-08-14 PROBLEM — O36.5990 SGA (SMALL FOR GESTATIONAL AGE), FETAL, AFFECTING CARE OF MOTHER, ANTEPARTUM: Status: ACTIVE | Noted: 2020-08-14

## 2020-08-27 ENCOUNTER — HOSPITAL ENCOUNTER (INPATIENT)
Age: 30
LOS: 4 days | Discharge: HOME | End: 2020-08-31
Attending: OBSTETRICS & GYNECOLOGY | Admitting: OBSTETRICS & GYNECOLOGY
Payer: COMMERCIAL

## 2020-08-27 DIAGNOSIS — G89.18 POST-OP PAIN: Primary | ICD-10-CM

## 2020-08-27 PROBLEM — O10.919 HTN IN PREGNANCY, CHRONIC: Status: ACTIVE | Noted: 2020-08-27

## 2020-08-27 PROBLEM — Z34.90 ENCOUNTER FOR PLANNED INDUCTION OF LABOR: Status: ACTIVE | Noted: 2020-08-27

## 2020-08-27 PROBLEM — Z3A.38 38 WEEKS GESTATION OF PREGNANCY: Status: ACTIVE | Noted: 2020-08-27

## 2020-08-27 LAB
ABO + RH BLD: NORMAL
BASOPHILS # BLD: 0 K/UL (ref 0–0.2)
BASOPHILS NFR BLD: 0 % (ref 0–2)
BLOOD GROUP ANTIBODIES SERPL: NORMAL
DIFFERENTIAL METHOD BLD: ABNORMAL
EOSINOPHIL # BLD: 0.1 K/UL (ref 0–0.8)
EOSINOPHIL NFR BLD: 1 % (ref 0.5–7.8)
ERYTHROCYTE [DISTWIDTH] IN BLOOD BY AUTOMATED COUNT: 13.2 % (ref 11.9–14.6)
HCT VFR BLD AUTO: 39.5 % (ref 35.8–46.3)
HGB BLD-MCNC: 13.3 G/DL (ref 11.7–15.4)
IMM GRANULOCYTES # BLD AUTO: 0.1 K/UL (ref 0–0.5)
IMM GRANULOCYTES NFR BLD AUTO: 0 % (ref 0–5)
LYMPHOCYTES # BLD: 3.5 K/UL (ref 0.5–4.6)
LYMPHOCYTES NFR BLD: 26 % (ref 13–44)
MCH RBC QN AUTO: 29 PG (ref 26.1–32.9)
MCHC RBC AUTO-ENTMCNC: 33.7 G/DL (ref 31.4–35)
MCV RBC AUTO: 86.1 FL (ref 79.6–97.8)
MONOCYTES # BLD: 1.1 K/UL (ref 0.1–1.3)
MONOCYTES NFR BLD: 8 % (ref 4–12)
NEUTS SEG # BLD: 8.8 K/UL (ref 1.7–8.2)
NEUTS SEG NFR BLD: 65 % (ref 43–78)
NRBC # BLD: 0 K/UL (ref 0–0.2)
PLATELET # BLD AUTO: 261 K/UL (ref 150–450)
PMV BLD AUTO: 12.6 FL (ref 9.4–12.3)
RBC # BLD AUTO: 4.59 M/UL (ref 4.05–5.2)
SPECIMEN EXP DATE BLD: NORMAL
WBC # BLD AUTO: 13.6 K/UL (ref 4.3–11.1)

## 2020-08-27 PROCEDURE — 85025 COMPLETE CBC W/AUTO DIFF WBC: CPT

## 2020-08-27 PROCEDURE — 65270000029 HC RM PRIVATE

## 2020-08-27 PROCEDURE — 86900 BLOOD TYPING SEROLOGIC ABO: CPT

## 2020-08-27 PROCEDURE — 74011250637 HC RX REV CODE- 250/637: Performed by: OBSTETRICS & GYNECOLOGY

## 2020-08-27 PROCEDURE — 36415 COLL VENOUS BLD VENIPUNCTURE: CPT

## 2020-08-27 RX ORDER — LIDOCAINE HYDROCHLORIDE 20 MG/ML
JELLY TOPICAL
Status: ACTIVE | OUTPATIENT
Start: 2020-08-27 | End: 2020-08-28

## 2020-08-27 RX ORDER — SODIUM CHLORIDE 0.9 % (FLUSH) 0.9 %
5-40 SYRINGE (ML) INJECTION AS NEEDED
Status: DISCONTINUED | OUTPATIENT
Start: 2020-08-27 | End: 2020-08-30

## 2020-08-27 RX ORDER — SERTRALINE HYDROCHLORIDE 50 MG/1
50 TABLET, FILM COATED ORAL EVERY EVENING
Status: DISCONTINUED | OUTPATIENT
Start: 2020-08-27 | End: 2020-08-29 | Stop reason: DRUGHIGH

## 2020-08-27 RX ORDER — SODIUM CHLORIDE 0.9 % (FLUSH) 0.9 %
5-40 SYRINGE (ML) INJECTION EVERY 8 HOURS
Status: DISCONTINUED | OUTPATIENT
Start: 2020-08-27 | End: 2020-08-29 | Stop reason: SDUPTHER

## 2020-08-27 RX ORDER — LIDOCAINE HYDROCHLORIDE 10 MG/ML
1 INJECTION INFILTRATION; PERINEURAL
Status: ACTIVE | OUTPATIENT
Start: 2020-08-27 | End: 2020-08-28

## 2020-08-27 RX ORDER — SERTRALINE HYDROCHLORIDE 50 MG/1
100 TABLET, FILM COATED ORAL DAILY
Status: DISCONTINUED | OUTPATIENT
Start: 2020-08-28 | End: 2020-08-30

## 2020-08-27 RX ORDER — SODIUM CHLORIDE 0.9 % (FLUSH) 0.9 %
5-40 SYRINGE (ML) INJECTION EVERY 8 HOURS
Status: DISCONTINUED | OUTPATIENT
Start: 2020-08-27 | End: 2020-08-30

## 2020-08-27 RX ORDER — OXYTOCIN/RINGER'S LACTATE 30/500 ML
250 PLASTIC BAG, INJECTION (ML) INTRAVENOUS ONCE
Status: ACTIVE | OUTPATIENT
Start: 2020-08-27 | End: 2020-08-28

## 2020-08-27 RX ORDER — MINERAL OIL
120 OIL (ML) ORAL
Status: ACTIVE | OUTPATIENT
Start: 2020-08-27 | End: 2020-08-28

## 2020-08-27 RX ORDER — BUSPIRONE HYDROCHLORIDE 5 MG/1
15 TABLET ORAL 2 TIMES DAILY
Status: COMPLETED | OUTPATIENT
Start: 2020-08-27 | End: 2020-08-28

## 2020-08-27 RX ORDER — DEXTROSE, SODIUM CHLORIDE, SODIUM LACTATE, POTASSIUM CHLORIDE, AND CALCIUM CHLORIDE 5; .6; .31; .03; .02 G/100ML; G/100ML; G/100ML; G/100ML; G/100ML
125 INJECTION, SOLUTION INTRAVENOUS CONTINUOUS
Status: DISCONTINUED | OUTPATIENT
Start: 2020-08-27 | End: 2020-08-29

## 2020-08-27 RX ORDER — BUTORPHANOL TARTRATE 2 MG/ML
1 INJECTION INTRAMUSCULAR; INTRAVENOUS
Status: DISCONTINUED | OUTPATIENT
Start: 2020-08-27 | End: 2020-08-29 | Stop reason: HOSPADM

## 2020-08-27 RX ORDER — OXYTOCIN/RINGER'S LACTATE 30/500 ML
0-30 PLASTIC BAG, INJECTION (ML) INTRAVENOUS
Status: DISCONTINUED | OUTPATIENT
Start: 2020-08-27 | End: 2020-08-29 | Stop reason: HOSPADM

## 2020-08-27 RX ADMIN — MISOPROSTOL 50 MCG: 100 TABLET ORAL at 20:13

## 2020-08-27 RX ADMIN — BUSPIRONE HYDROCHLORIDE 15 MG: 5 TABLET ORAL at 21:02

## 2020-08-27 RX ADMIN — SERTRALINE HYDROCHLORIDE 50 MG: 50 TABLET ORAL at 21:02

## 2020-08-27 NOTE — PROGRESS NOTES
Patient ID verified. Allergies, medical history, prenatal record and prior to admission medications verified. Pt instructed to be NPO after midnight. Pt instructed to arrive at hospital @1830,come to entrance C and sign in at the registration desk on the 4th floor. Patient instructed to come to hospital sooner if SROM, labor, or concerning symptoms. Patient verbalized understanding. Questions encouraged and answered.

## 2020-08-28 ENCOUNTER — ANESTHESIA (OUTPATIENT)
Dept: LABOR AND DELIVERY | Age: 30
End: 2020-08-28
Payer: COMMERCIAL

## 2020-08-28 ENCOUNTER — ANESTHESIA EVENT (OUTPATIENT)
Dept: LABOR AND DELIVERY | Age: 30
End: 2020-08-28
Payer: COMMERCIAL

## 2020-08-28 PROCEDURE — 77030040830 HC CATH URETH FOL MDII -A

## 2020-08-28 PROCEDURE — 74011250636 HC RX REV CODE- 250/636: Performed by: ANESTHESIOLOGY

## 2020-08-28 PROCEDURE — A4300 CATH IMPL VASC ACCESS PORTAL: HCPCS | Performed by: ANESTHESIOLOGY

## 2020-08-28 PROCEDURE — 65270000029 HC RM PRIVATE

## 2020-08-28 PROCEDURE — 77030014125 HC TY EPDRL BBMI -B: Performed by: ANESTHESIOLOGY

## 2020-08-28 PROCEDURE — 74011000250 HC RX REV CODE- 250: Performed by: ANESTHESIOLOGY

## 2020-08-28 PROCEDURE — 77030010848 HC CATH INTUTR PRSS KOLB -B

## 2020-08-28 PROCEDURE — 74011250637 HC RX REV CODE- 250/637: Performed by: OBSTETRICS & GYNECOLOGY

## 2020-08-28 PROCEDURE — 74011250636 HC RX REV CODE- 250/636: Performed by: OBSTETRICS & GYNECOLOGY

## 2020-08-28 PROCEDURE — 74011000250 HC RX REV CODE- 250: Performed by: OBSTETRICS & GYNECOLOGY

## 2020-08-28 PROCEDURE — 74011250636 HC RX REV CODE- 250/636: Performed by: NURSE ANESTHETIST, CERTIFIED REGISTERED

## 2020-08-28 RX ORDER — ROPIVACAINE HYDROCHLORIDE 2 MG/ML
INJECTION, SOLUTION EPIDURAL; INFILTRATION; PERINEURAL
Status: DISCONTINUED | OUTPATIENT
Start: 2020-08-28 | End: 2020-08-29 | Stop reason: HOSPADM

## 2020-08-28 RX ORDER — LIDOCAINE HYDROCHLORIDE AND EPINEPHRINE 15; 5 MG/ML; UG/ML
INJECTION, SOLUTION EPIDURAL
Status: COMPLETED | OUTPATIENT
Start: 2020-08-28 | End: 2020-08-28

## 2020-08-28 RX ORDER — LORAZEPAM 0.5 MG/1
1 TABLET ORAL ONCE
Status: DISCONTINUED | OUTPATIENT
Start: 2020-08-28 | End: 2020-08-28 | Stop reason: SDUPTHER

## 2020-08-28 RX ORDER — ONDANSETRON 2 MG/ML
INJECTION INTRAMUSCULAR; INTRAVENOUS
Status: ACTIVE
Start: 2020-08-28 | End: 2020-08-28

## 2020-08-28 RX ORDER — LORAZEPAM 2 MG/ML
1 INJECTION INTRAMUSCULAR
Status: COMPLETED | OUTPATIENT
Start: 2020-08-28 | End: 2020-08-28

## 2020-08-28 RX ORDER — ONDANSETRON 2 MG/ML
4 INJECTION INTRAMUSCULAR; INTRAVENOUS
Status: DISCONTINUED | OUTPATIENT
Start: 2020-08-28 | End: 2020-08-29 | Stop reason: SDUPTHER

## 2020-08-28 RX ORDER — PROMETHAZINE HYDROCHLORIDE 25 MG/ML
25 INJECTION, SOLUTION INTRAMUSCULAR; INTRAVENOUS
Status: DISCONTINUED | OUTPATIENT
Start: 2020-08-28 | End: 2020-08-29

## 2020-08-28 RX ORDER — ONDANSETRON 2 MG/ML
4 INJECTION INTRAMUSCULAR; INTRAVENOUS ONCE
Status: COMPLETED | OUTPATIENT
Start: 2020-08-28 | End: 2020-08-28

## 2020-08-28 RX ORDER — LORAZEPAM 2 MG/ML
INJECTION INTRAMUSCULAR
Status: ACTIVE
Start: 2020-08-28 | End: 2020-08-28

## 2020-08-28 RX ORDER — ROPIVACAINE HYDROCHLORIDE 2 MG/ML
INJECTION, SOLUTION EPIDURAL; INFILTRATION; PERINEURAL AS NEEDED
Status: DISCONTINUED | OUTPATIENT
Start: 2020-08-28 | End: 2020-08-29 | Stop reason: HOSPADM

## 2020-08-28 RX ORDER — LORAZEPAM 2 MG/ML
1 INJECTION INTRAMUSCULAR ONCE
Status: COMPLETED | OUTPATIENT
Start: 2020-08-28 | End: 2020-08-28

## 2020-08-28 RX ADMIN — BUTORPHANOL TARTRATE 1 MG: 2 INJECTION, SOLUTION INTRAMUSCULAR; INTRAVENOUS at 05:11

## 2020-08-28 RX ADMIN — SERTRALINE HYDROCHLORIDE 50 MG: 50 TABLET ORAL at 21:07

## 2020-08-28 RX ADMIN — Medication 5 ML: at 10:57

## 2020-08-28 RX ADMIN — ROPIVACAINE HYDROCHLORIDE 5 ML: 150 INJECTION, SOLUTION EPIDURAL; INFILTRATION; PERINEURAL at 21:16

## 2020-08-28 RX ADMIN — Medication 10 ML/HR: at 10:57

## 2020-08-28 RX ADMIN — Medication 5 ML: at 10:54

## 2020-08-28 RX ADMIN — ONDANSETRON 4 MG: 2 INJECTION INTRAMUSCULAR; INTRAVENOUS at 00:32

## 2020-08-28 RX ADMIN — MISOPROSTOL 50 MCG: 100 TABLET ORAL at 00:10

## 2020-08-28 RX ADMIN — Medication 28 MILLI-UNITS/MIN: at 19:37

## 2020-08-28 RX ADMIN — LIDOCAINE HYDROCHLORIDE,EPINEPHRINE BITARTRATE 3 ML: 15; .005 INJECTION, SOLUTION EPIDURAL; INFILTRATION; INTRACAUDAL; PERINEURAL at 10:49

## 2020-08-28 RX ADMIN — ONDANSETRON 4 MG: 2 INJECTION INTRAMUSCULAR; INTRAVENOUS at 11:54

## 2020-08-28 RX ADMIN — SODIUM CHLORIDE, SODIUM LACTATE, POTASSIUM CHLORIDE, CALCIUM CHLORIDE, AND DEXTROSE MONOHYDRATE 125 ML/HR: 600; 310; 30; 20; 5 INJECTION, SOLUTION INTRAVENOUS at 09:45

## 2020-08-28 RX ADMIN — LORAZEPAM 1 MG: 2 INJECTION INTRAMUSCULAR; INTRAVENOUS at 12:53

## 2020-08-28 RX ADMIN — PROMETHAZINE HYDROCHLORIDE 25 MG: 25 INJECTION INTRAMUSCULAR; INTRAVENOUS at 13:00

## 2020-08-28 RX ADMIN — BUSPIRONE HYDROCHLORIDE 15 MG: 5 TABLET ORAL at 21:07

## 2020-08-28 RX ADMIN — PROMETHAZINE HYDROCHLORIDE 25 MG: 25 INJECTION INTRAMUSCULAR; INTRAVENOUS at 21:08

## 2020-08-28 RX ADMIN — SODIUM CHLORIDE, SODIUM LACTATE, POTASSIUM CHLORIDE, AND CALCIUM CHLORIDE 500 ML: 600; 310; 30; 20 INJECTION, SOLUTION INTRAVENOUS at 10:59

## 2020-08-28 RX ADMIN — Medication 2 MILLI-UNITS/MIN: at 11:30

## 2020-08-28 RX ADMIN — ONDANSETRON 4 MG: 2 INJECTION INTRAMUSCULAR; INTRAVENOUS at 09:38

## 2020-08-28 RX ADMIN — MISOPROSTOL 50 MCG: 100 TABLET ORAL at 04:32

## 2020-08-28 RX ADMIN — BUSPIRONE HYDROCHLORIDE 15 MG: 5 TABLET ORAL at 09:30

## 2020-08-28 RX ADMIN — LORAZEPAM 1 MG: 2 INJECTION INTRAMUSCULAR; INTRAVENOUS at 11:54

## 2020-08-28 RX ADMIN — LIDOCAINE HYDROCHLORIDE,EPINEPHRINE BITARTRATE 3 ML: 15; .005 INJECTION, SOLUTION EPIDURAL; INFILTRATION; INTRACAUDAL; PERINEURAL at 23:28

## 2020-08-28 RX ADMIN — SERTRALINE HYDROCHLORIDE 100 MG: 50 TABLET ORAL at 09:30

## 2020-08-28 NOTE — PROGRESS NOTES
Phone call to Dr. Edilia Tinoco regarding patients regular medications. Per MD to order patients Zoloft 100 mg in AM and 50 mg in PM and Buspar 15 mg BID.  MD also advised of low baseline fetal heart rate, per MD within normal baseline of 110 BPM.

## 2020-08-28 NOTE — ANESTHESIA PROCEDURE NOTES
Epidural Block    Start time: 8/28/2020 10:34 AM  End time: 8/28/2020 10:50 AM  Performed by: Krystina Darby MD  Authorized by: Krystina Darby MD     Pre-Procedure  Indication: at surgeon's request and labor epidural    Preanesthetic Checklist: patient identified, risks and benefits discussed, anesthesia consent, site marked, patient being monitored, timeout performed and anesthesia consent    Timeout Time: 10:34        Epidural:   Patient position:  Seated  Prep region:  Lumbar  Prep: Chlorhexidine    Location:  L3-4    Needle and Epidural Catheter:   Needle Type:  Tuohy  Needle Gauge:  17 G  Injection Technique:  Loss of resistance using saline  Attempts:  2  Catheter Size:  19 G  Catheter at Skin Depth (cm):  14 (MICKIE @ 10 cm )  Events: no blood with aspiration, no cerebrospinal fluid with aspiration, no paresthesia and negative aspiration test    Test Dose:  Negative    Assessment:   Catheter Secured:  Tegaderm and tape  Insertion:  Uncomplicated  Patient tolerance:  Patient tolerated the procedure well with no immediate complications

## 2020-08-28 NOTE — PROGRESS NOTES
Phone call to MD regarding inability in obtaining continuous fetal heart tone tracing due to excessive fetal movement and maternal habitus. Expressed concern with administering next dose of Cytotec 50 mcg due at 0400 with no continuous tracing. Per MD may administer next dose due at 0400 since positive fetal movement noted.

## 2020-08-28 NOTE — H&P
History & Physical    Name: Bill Fierro MRN: [id number]  SSN: [id number]    YOB: 1990  Age: 34 y.o. Sex: female      Subjective:     Estimated Date of Delivery: 20  OB History    Para Term  AB Living   2 0 0 0 1 0   SAB TAB Ectopic Molar Multiple Live Births   1 0 0 0 0 0      # Outcome Date GA Lbr Cash/2nd Weight Sex Delivery Anes PTL Lv   2 Current            1 2009               Ms. Marciano Koch is admitted with pregnancy at 38w6d for induction of labor due to [facility] and morbid obesity with BMI 49.90. Prenatal course was complicated by [facility], morbid obesity, anxiety & psychiatric disorder. .  Please see prenatal records for details.     Past Medical History:   Diagnosis Date    Abnormal weight gain 10/15/2015    Anxiety     Chronic hypertension complicating or reason for care during pregnancy, second trimester 4/15/2020    Depression     Frequent headaches 10/15/2015    Striae purple 10/15/2015     Past Surgical History:   Procedure Laterality Date    HX CHOLECYSTECTOMY      HX WISDOM TEETH EXTRACTION      16-14 years old     Social History     Occupational History    Not on file   Tobacco Use    Smoking status: Current Every Day Smoker     Packs/day: 0.50     Years: 13.00     Pack years: 6.50     Types: Cigarettes    Smokeless tobacco: Never Used   Substance and Sexual Activity    Alcohol use: Not Currently     Alcohol/week: 0.0 standard drinks     Comment: occ    Drug use: Never    Sexual activity: Yes     Partners: Male     Birth control/protection: None     Family History   Problem Relation Age of Onset    Kidney Disease Mother         stones    Breast Cancer Mother     Diabetes Father     Arthritis-osteo Father     Thyroid Disease Maternal Grandmother     Hypertension Neg Hx        Allergies   Allergen Reactions    Latex Rash    Augmentin [Amoxicillin-Pot Clavulanate] Rash    Metronidazole Hives and Itching     Prior to Admission medications Medication Sig Start Date End Date Taking? Authorizing Provider   fexofenadine (ALLEGRA) 60 mg tablet Take 1 Tab by mouth two (2) times a day. 8/18/20  Yes Best Jackman MD   cetirizine (ZYRTEC) 10 mg tablet Take 1 Tab by mouth daily. 8/18/20  Yes Best Jackman MD   ondansetron (ZOFRAN ODT) 4 mg disintegrating tablet Take 1 Tab by mouth every eight (8) hours as needed for Nausea or Vomiting. 8/18/20  Yes Best Jackman MD   acetaminophen (TYLENOL EXTRA STRENGTH) 500 mg tablet Take  by mouth every six (6) hours as needed for Pain. Yes Provider, Historical   calcium carbonate (TUMS) 200 mg calcium (500 mg) chew Take 1 Tab by mouth daily. Yes Provider, Historical   prenatal vits62/FA/om3/dha/epa (PRENATAL GUMMY PO) Take  by mouth. Yes Provider, Historical   sertraline (ZOLOFT) 100 mg tablet TAKE 1 TABLET BY MOUTH EVERY DAY 11/20/17  Yes Oseas Ulrich NP   ALPRAZolam Caroline Bears) 1 mg tablet 4/15 pt states she has been taking once week 8/17/17  Yes Provider, Historical   busPIRone (BUSPAR) 10 mg tablet Take 1 Tab by mouth three (3) times daily. 3/3/17  Yes Oseas Ulrich NP   aspirin/acetaminophen/caffeine (EXCEDRIN MIGRAINE PO) Take  by mouth. Provider, Historical   budesonide-formoterol (SYMBICORT) 160-4.5 mcg/actuation HFAA Take 2 Puffs by inhalation two (2) times a day. 5/9/19   Oseas Ulrich NP   albuterol (PROVENTIL HFA, VENTOLIN HFA, PROAIR HFA) 90 mcg/actuation inhaler Take 2 Puffs by inhalation every six (6) hours as needed for Wheezing. 4/17/19   Lesly Case NP        Review of Systems: A comprehensive review of systems was negative except for that written in the History of Present Illness. Objective:     Vitals:  Vitals:    08/28/20 1200 08/28/20 1208 08/28/20 1209 08/28/20 1225   BP: 131/65 145/81 146/65 137/65   Pulse: 90 93 66 82   Resp:       Temp: 98.3 °F (36.8 °C)           Physical Exam:  Patient without distress.   Heart: Regular rate  Lung: normal respiratory effort  Abdomen: soft, nontender, obese, gravid  Fundus: soft and non tender  Perineum: blood absent, amniotic fluid absent  Cervical Exam: 1 cm dilated    50% effaced    -3 station    Lower Extremities:  - Edema nonpitting  Membranes:  Artificial Rupture of Membranes; Amniotic Fluid: large amount of thin meconium fluid  Fetal Heart Rate: Difficult to trace due to maternal body habitus. Discussed FSE & IUPC in order to be able to proceed with IOL. R/b/a discussed. Pt consented. IUPC attempted w/o AROM and BOW was not ROM'd. AROM then performed with amniohook and meconium fluid noted. Another IUPC obtained and placed successfully. FSE then placed as well. Pt tolerated well. Prenatal Labs:   Lab Results   Component Value Date/Time    ABO/Rh(D) O POSITIVE 08/27/2020 07:01 PM       Impression/Plan:     Principal Problem:    HTN in pregnancy, chronic (8/27/2020)    Active Problems:    38 weeks gestation of pregnancy (8/27/2020)      Body mass index (BMI) of 45.0 to 49.9 in adult [facility]) (8/27/2020)      Encounter for planned induction of labor (8/27/2020)         Plan: Admit for induction of labor. Group B Strep negative. Risks, benefits and alternatives to IOL discussed - pt with medical induction due to [facility]. Pt expressed understanding of these r/b/a and desires to proceed with with IOL.

## 2020-08-28 NOTE — PROGRESS NOTES
Judith Mitten Dr Ephriam Buerger at bedside at 56. Assisted pt to sitting up on bedside at 1032. Timeout completed at [phone] with MD, AUBREE and myself at bedside. Test dose given at 1049. Negative reaction. Dose given at 1055. Pt assisted to lying back in left tilt position. See anesthesia record for details. See vital sign flow sheet for BP. Tolerated procedure well.

## 2020-08-28 NOTE — PROGRESS NOTES
Patient with no contractions or discomfort after first dose of Cytotec. Cytotec 50 mcg given PO as per order to continue with IOL. Patient with complaints of nausea. Phone call to Dr. Marivel johnson for patient to have order for Zofran 4 mg every 4 hours prn for nausea. Order reviewed and verified.

## 2020-08-28 NOTE — PROGRESS NOTES
Pt presented for scheduled IOL. Pt states she had a phone interview with [address] [place] [address] RN and the information that was obtained has not changed. POC reviewed. IV started, Consents witnessed. Lab work drawn, sent to lab.

## 2020-08-28 NOTE — PROGRESS NOTES
8119 I went into the room because IV was beeping. Pt c/o chills and was shaking. She also said she was nauseous and she was \"feeling weird\" since the epidural.  Pt clarified that she is nauseous and didn't like that her legs feeling numb. She also said \"i'm having an anxiety attack\". Pt had zofran at 0930. Father of baby at bedside. [address] Dr Alejandra Layne called and notified that patient was having an anxiety attack and nauseous. Medications ordered for nausea and anxiety.   Report given to [facility]

## 2020-08-28 NOTE — PROGRESS NOTES
SBAR report given to [facility]. Relinquished care of patient.

## 2020-08-29 LAB
ARTERIAL PATENCY WRIST A: ABNORMAL
ARTERIAL PATENCY WRIST A: ABNORMAL
BASE DEFICIT BLD-SCNC: 3 MMOL/L
BASE DEFICIT BLD-SCNC: 3 MMOL/L
BDY SITE: ABNORMAL
BDY SITE: ABNORMAL
CA-I BLD-MCNC: 1.45 MMOL/L (ref 1.12–1.32)
CA-I BLD-MCNC: 1.45 MMOL/L (ref 1.12–1.32)
COLLECT TIME,HTIME: 336
COLLECT TIME,HTIME: 336
GAS FLOW.O2 O2 DELIVERY SYS: ABNORMAL L/MIN
GAS FLOW.O2 O2 DELIVERY SYS: ABNORMAL L/MIN
GLUCOSE BLD STRIP.AUTO-MCNC: 70 MG/DL (ref 65–100)
GLUCOSE BLD STRIP.AUTO-MCNC: 83 MG/DL (ref 65–100)
HCO3 BLD-SCNC: 22 MMOL/L (ref 22–26)
HCO3 BLD-SCNC: 24.1 MMOL/L (ref 22–26)
PCO2 BLD: 39.5 MMHG (ref 35–45)
PCO2 BLD: 48.4 MMHG (ref 35–45)
PH BLD: 7.31 [PH] (ref 7.35–7.45)
PH BLD: 7.36 [PH] (ref 7.35–7.45)
PO2 BLD: 11 MMHG (ref 75–100)
PO2 BLD: 20 MMHG (ref 75–100)
POTASSIUM BLD-SCNC: 4.5 MMOL/L (ref 3.5–5.1)
POTASSIUM BLD-SCNC: 4.9 MMOL/L (ref 3.5–5.1)
SAO2 % BLD: 30 % (ref 95–98)
SAO2 % BLD: 9 % (ref 95–98)
SERVICE CMNT-IMP: ABNORMAL
SERVICE CMNT-IMP: ABNORMAL
SODIUM BLD-SCNC: 139 MMOL/L (ref 136–145)
SODIUM BLD-SCNC: 140 MMOL/L (ref 136–145)
SPECIMEN TYPE: ABNORMAL
SPECIMEN TYPE: ABNORMAL

## 2020-08-29 PROCEDURE — 3E033VJ INTRODUCTION OF OTHER HORMONE INTO PERIPHERAL VEIN, PERCUTANEOUS APPROACH: ICD-10-PCS | Performed by: OBSTETRICS & GYNECOLOGY

## 2020-08-29 PROCEDURE — 74011250636 HC RX REV CODE- 250/636: Performed by: NURSE ANESTHETIST, CERTIFIED REGISTERED

## 2020-08-29 PROCEDURE — 77030031139 HC SUT VCRL2 J&J -A: Performed by: OBSTETRICS & GYNECOLOGY

## 2020-08-29 PROCEDURE — 74011250636 HC RX REV CODE- 250/636: Performed by: ANESTHESIOLOGY

## 2020-08-29 PROCEDURE — 3E0P7VZ INTRODUCTION OF HORMONE INTO FEMALE REPRODUCTIVE, VIA NATURAL OR ARTIFICIAL OPENING: ICD-10-PCS | Performed by: OBSTETRICS & GYNECOLOGY

## 2020-08-29 PROCEDURE — 74011250636 HC RX REV CODE- 250/636: Performed by: OBSTETRICS & GYNECOLOGY

## 2020-08-29 PROCEDURE — 10907ZC DRAINAGE OF AMNIOTIC FLUID, THERAPEUTIC FROM PRODUCTS OF CONCEPTION, VIA NATURAL OR ARTIFICIAL OPENING: ICD-10-PCS | Performed by: OBSTETRICS & GYNECOLOGY

## 2020-08-29 PROCEDURE — 77030002974 HC SUT PLN J&J -A: Performed by: OBSTETRICS & GYNECOLOGY

## 2020-08-29 PROCEDURE — 74011000258 HC RX REV CODE- 258: Performed by: OBSTETRICS & GYNECOLOGY

## 2020-08-29 PROCEDURE — 76010000392 HC C SECN EA ADDL 0.5 HR: Performed by: OBSTETRICS & GYNECOLOGY

## 2020-08-29 PROCEDURE — 76010000391 HC C SECN FIRST 1 HR: Performed by: OBSTETRICS & GYNECOLOGY

## 2020-08-29 PROCEDURE — 75410000003 HC RECOV DEL/VAG/CSECN EA 0.5 HR: Performed by: OBSTETRICS & GYNECOLOGY

## 2020-08-29 PROCEDURE — 77030002888 HC SUT CHRMC J&J -A: Performed by: OBSTETRICS & GYNECOLOGY

## 2020-08-29 PROCEDURE — 77030010848 HC CATH INTUTR PRSS KOLB -B

## 2020-08-29 PROCEDURE — 82803 BLOOD GASES ANY COMBINATION: CPT

## 2020-08-29 PROCEDURE — 74011250637 HC RX REV CODE- 250/637: Performed by: ANESTHESIOLOGY

## 2020-08-29 PROCEDURE — 74011000250 HC RX REV CODE- 250: Performed by: OBSTETRICS & GYNECOLOGY

## 2020-08-29 PROCEDURE — 77030002933 HC SUT MCRYL J&J -A: Performed by: OBSTETRICS & GYNECOLOGY

## 2020-08-29 PROCEDURE — 74011250637 HC RX REV CODE- 250/637: Performed by: OBSTETRICS & GYNECOLOGY

## 2020-08-29 PROCEDURE — 82947 ASSAY GLUCOSE BLOOD QUANT: CPT

## 2020-08-29 PROCEDURE — 77030027138 HC INCENT SPIROMETER -A

## 2020-08-29 PROCEDURE — 65270000029 HC RM PRIVATE

## 2020-08-29 PROCEDURE — 76060000078 HC EPIDURAL ANESTHESIA: Performed by: OBSTETRICS & GYNECOLOGY

## 2020-08-29 PROCEDURE — 77030032490 HC SLV COMPR SCD KNE COVD -B: Performed by: OBSTETRICS & GYNECOLOGY

## 2020-08-29 PROCEDURE — 74011000250 HC RX REV CODE- 250: Performed by: ANESTHESIOLOGY

## 2020-08-29 RX ORDER — IPRATROPIUM BROMIDE AND ALBUTEROL SULFATE 2.5; .5 MG/3ML; MG/3ML
3 SOLUTION RESPIRATORY (INHALATION)
Status: DISCONTINUED | OUTPATIENT
Start: 2020-08-29 | End: 2020-08-31 | Stop reason: HOSPADM

## 2020-08-29 RX ORDER — CLINDAMYCIN PHOSPHATE 900 MG/50ML
900 INJECTION INTRAVENOUS ONCE
Status: COMPLETED | OUTPATIENT
Start: 2020-08-29 | End: 2020-08-29

## 2020-08-29 RX ORDER — TRISODIUM CITRATE DIHYDRATE AND CITRIC ACID MONOHYDRATE 500; 334 MG/5ML; MG/5ML
30 SOLUTION ORAL
Status: COMPLETED | OUTPATIENT
Start: 2020-08-29 | End: 2020-08-29

## 2020-08-29 RX ORDER — SODIUM CHLORIDE, SODIUM LACTATE, POTASSIUM CHLORIDE, CALCIUM CHLORIDE 600; 310; 30; 20 MG/100ML; MG/100ML; MG/100ML; MG/100ML
50 INJECTION, SOLUTION INTRAVENOUS CONTINUOUS
Status: DISCONTINUED | OUTPATIENT
Start: 2020-08-29 | End: 2020-08-30

## 2020-08-29 RX ORDER — OXYTOCIN/RINGER'S LACTATE 30/500 ML
PLASTIC BAG, INJECTION (ML) INTRAVENOUS
Status: DISCONTINUED | OUTPATIENT
Start: 2020-08-29 | End: 2020-08-29 | Stop reason: HOSPADM

## 2020-08-29 RX ORDER — SERTRALINE HYDROCHLORIDE 50 MG/1
50 TABLET, FILM COATED ORAL EVERY EVENING
Status: DISCONTINUED | OUTPATIENT
Start: 2020-08-29 | End: 2020-08-31 | Stop reason: HOSPADM

## 2020-08-29 RX ORDER — HYDROMORPHONE HYDROCHLORIDE 1 MG/ML
0.5 INJECTION, SOLUTION INTRAMUSCULAR; INTRAVENOUS; SUBCUTANEOUS
Status: DISCONTINUED | OUTPATIENT
Start: 2020-08-29 | End: 2020-08-30

## 2020-08-29 RX ORDER — BUSPIRONE HYDROCHLORIDE 5 MG/1
10 TABLET ORAL 3 TIMES DAILY
Status: DISCONTINUED | OUTPATIENT
Start: 2020-08-29 | End: 2020-08-31 | Stop reason: HOSPADM

## 2020-08-29 RX ORDER — NALOXONE HYDROCHLORIDE 0.4 MG/ML
0.2 INJECTION, SOLUTION INTRAMUSCULAR; INTRAVENOUS; SUBCUTANEOUS
Status: DISCONTINUED | OUTPATIENT
Start: 2020-08-29 | End: 2020-08-30

## 2020-08-29 RX ORDER — LORATADINE 10 MG/1
10 TABLET ORAL DAILY
Status: DISCONTINUED | OUTPATIENT
Start: 2020-08-29 | End: 2020-08-31 | Stop reason: HOSPADM

## 2020-08-29 RX ORDER — GENTAMICIN SULFATE 60 MG/50ML
120 INJECTION, SOLUTION INTRAVENOUS ONCE
Status: DISCONTINUED | OUTPATIENT
Start: 2020-08-29 | End: 2020-08-29 | Stop reason: DRUGHIGH

## 2020-08-29 RX ORDER — BUPIVACAINE HYDROCHLORIDE 7.5 MG/ML
INJECTION, SOLUTION INTRASPINAL
Status: COMPLETED | OUTPATIENT
Start: 2020-08-29 | End: 2020-08-29

## 2020-08-29 RX ORDER — SODIUM CHLORIDE, SODIUM LACTATE, POTASSIUM CHLORIDE, CALCIUM CHLORIDE 600; 310; 30; 20 MG/100ML; MG/100ML; MG/100ML; MG/100ML
INJECTION, SOLUTION INTRAVENOUS
Status: DISCONTINUED | OUTPATIENT
Start: 2020-08-29 | End: 2020-08-29 | Stop reason: HOSPADM

## 2020-08-29 RX ORDER — KETOROLAC TROMETHAMINE 30 MG/ML
INJECTION, SOLUTION INTRAMUSCULAR; INTRAVENOUS AS NEEDED
Status: DISCONTINUED | OUTPATIENT
Start: 2020-08-29 | End: 2020-08-29 | Stop reason: HOSPADM

## 2020-08-29 RX ORDER — ONDANSETRON 2 MG/ML
4 INJECTION INTRAMUSCULAR; INTRAVENOUS
Status: DISCONTINUED | OUTPATIENT
Start: 2020-08-29 | End: 2020-08-30

## 2020-08-29 RX ORDER — BUDESONIDE AND FORMOTEROL FUMARATE DIHYDRATE 160; 4.5 UG/1; UG/1
2 AEROSOL RESPIRATORY (INHALATION) 2 TIMES DAILY
Status: DISCONTINUED | OUTPATIENT
Start: 2020-08-29 | End: 2020-08-31 | Stop reason: HOSPADM

## 2020-08-29 RX ORDER — SERTRALINE HYDROCHLORIDE 50 MG/1
100 TABLET, FILM COATED ORAL DAILY
Status: DISCONTINUED | OUTPATIENT
Start: 2020-08-29 | End: 2020-08-29 | Stop reason: SDUPTHER

## 2020-08-29 RX ORDER — PHENYLEPHRINE HYDROCHLORIDE 10 MG/ML
INJECTION INTRAVENOUS AS NEEDED
Status: DISCONTINUED | OUTPATIENT
Start: 2020-08-29 | End: 2020-08-29 | Stop reason: HOSPADM

## 2020-08-29 RX ORDER — ONDANSETRON 2 MG/ML
INJECTION INTRAMUSCULAR; INTRAVENOUS AS NEEDED
Status: DISCONTINUED | OUTPATIENT
Start: 2020-08-29 | End: 2020-08-29 | Stop reason: HOSPADM

## 2020-08-29 RX ORDER — KETOROLAC TROMETHAMINE 30 MG/ML
30 INJECTION, SOLUTION INTRAMUSCULAR; INTRAVENOUS
Status: DISCONTINUED | OUTPATIENT
Start: 2020-08-29 | End: 2020-08-30

## 2020-08-29 RX ORDER — ACETAMINOPHEN 325 MG/1
650 TABLET ORAL EVERY 6 HOURS
Status: DISCONTINUED | OUTPATIENT
Start: 2020-08-29 | End: 2020-08-30

## 2020-08-29 RX ORDER — MORPHINE SULFATE 0.5 MG/ML
INJECTION, SOLUTION EPIDURAL; INTRATHECAL; INTRAVENOUS
Status: COMPLETED | OUTPATIENT
Start: 2020-08-29 | End: 2020-08-29

## 2020-08-29 RX ORDER — OXYCODONE HYDROCHLORIDE 5 MG/1
10 TABLET ORAL
Status: DISCONTINUED | OUTPATIENT
Start: 2020-08-29 | End: 2020-08-30

## 2020-08-29 RX ADMIN — PHENYLEPHRINE HYDROCHLORIDE 160 MCG: 10 INJECTION INTRAVENOUS at 03:41

## 2020-08-29 RX ADMIN — CLINDAMYCIN IN 5 PERCENT DEXTROSE 900 MG: 18 INJECTION, SOLUTION INTRAVENOUS at 03:06

## 2020-08-29 RX ADMIN — BUSPIRONE HYDROCHLORIDE 10 MG: 5 TABLET ORAL at 22:03

## 2020-08-29 RX ADMIN — SODIUM CHLORIDE, SODIUM LACTATE, POTASSIUM CHLORIDE, AND CALCIUM CHLORIDE 50 ML/HR: 600; 310; 30; 20 INJECTION, SOLUTION INTRAVENOUS at 05:25

## 2020-08-29 RX ADMIN — LORATADINE 10 MG: 10 TABLET ORAL at 09:12

## 2020-08-29 RX ADMIN — PHENYLEPHRINE HYDROCHLORIDE 200 MCG: 10 INJECTION INTRAVENOUS at 03:57

## 2020-08-29 RX ADMIN — BUPIVACAINE HYDROCHLORIDE IN DEXTROSE 12 MG: 7.5 INJECTION, SOLUTION SUBARACHNOID at 03:24

## 2020-08-29 RX ADMIN — Medication 10 ML: at 15:50

## 2020-08-29 RX ADMIN — ONDANSETRON 4 MG: 2 INJECTION INTRAMUSCULAR; INTRAVENOUS at 03:25

## 2020-08-29 RX ADMIN — BUSPIRONE HYDROCHLORIDE 10 MG: 5 TABLET ORAL at 09:11

## 2020-08-29 RX ADMIN — SODIUM CITRATE AND CITRIC ACID MONOHYDRATE 30 ML: 500; 334 SOLUTION ORAL at 02:59

## 2020-08-29 RX ADMIN — ACETAMINOPHEN 650 MG: 325 TABLET, FILM COATED ORAL at 12:07

## 2020-08-29 RX ADMIN — SERTRALINE HYDROCHLORIDE 100 MG: 50 TABLET ORAL at 09:12

## 2020-08-29 RX ADMIN — MORPHINE SULFATE 0.15 MG: 0.5 INJECTION, SOLUTION EPIDURAL; INTRATHECAL; INTRAVENOUS at 03:24

## 2020-08-29 RX ADMIN — SODIUM CHLORIDE, SODIUM LACTATE, POTASSIUM CHLORIDE, AND CALCIUM CHLORIDE: 600; 310; 30; 20 INJECTION, SOLUTION INTRAVENOUS at 04:21

## 2020-08-29 RX ADMIN — KETOROLAC TROMETHAMINE 30 MG: 30 INJECTION, SOLUTION INTRAMUSCULAR at 15:48

## 2020-08-29 RX ADMIN — PHENYLEPHRINE HYDROCHLORIDE 160 MCG: 10 INJECTION INTRAVENOUS at 03:26

## 2020-08-29 RX ADMIN — FAMOTIDINE 20 MG: 10 INJECTION INTRAVENOUS at 02:59

## 2020-08-29 RX ADMIN — SERTRALINE HYDROCHLORIDE 50 MG: 50 TABLET ORAL at 22:03

## 2020-08-29 RX ADMIN — KETOROLAC TROMETHAMINE 30 MG: 30 INJECTION, SOLUTION INTRAMUSCULAR; INTRAVENOUS at 04:19

## 2020-08-29 RX ADMIN — OXYCODONE 10 MG: 5 TABLET ORAL at 13:34

## 2020-08-29 RX ADMIN — PHENYLEPHRINE HYDROCHLORIDE 200 MCG: 10 INJECTION INTRAVENOUS at 03:47

## 2020-08-29 RX ADMIN — Medication 500 ML/HR: at 03:37

## 2020-08-29 RX ADMIN — GENTAMICIN SULFATE 500 MG: 40 INJECTION, SOLUTION INTRAMUSCULAR; INTRAVENOUS at 03:29

## 2020-08-29 RX ADMIN — SODIUM CHLORIDE, SODIUM LACTATE, POTASSIUM CHLORIDE, AND CALCIUM CHLORIDE: 600; 310; 30; 20 INJECTION, SOLUTION INTRAVENOUS at 03:08

## 2020-08-29 RX ADMIN — PHENYLEPHRINE HYDROCHLORIDE 160 MCG: 10 INJECTION INTRAVENOUS at 03:32

## 2020-08-29 RX ADMIN — AZITHROMYCIN 500 MG: 500 INJECTION, POWDER, LYOPHILIZED, FOR SOLUTION INTRAVENOUS at 03:01

## 2020-08-29 RX ADMIN — ACETAMINOPHEN 650 MG: 325 TABLET, FILM COATED ORAL at 06:33

## 2020-08-29 RX ADMIN — ACETAMINOPHEN 650 MG: 325 TABLET, FILM COATED ORAL at 17:40

## 2020-08-29 RX ADMIN — SODIUM CHLORIDE, SODIUM LACTATE, POTASSIUM CHLORIDE, AND CALCIUM CHLORIDE 500 ML: 600; 310; 30; 20 INJECTION, SOLUTION INTRAVENOUS at 16:10

## 2020-08-29 RX ADMIN — PHENYLEPHRINE HYDROCHLORIDE 200 MCG: 10 INJECTION INTRAVENOUS at 04:09

## 2020-08-29 RX ADMIN — HYDROMORPHONE HYDROCHLORIDE 0.5 MG: 1 INJECTION, SOLUTION INTRAMUSCULAR; INTRAVENOUS; SUBCUTANEOUS at 22:15

## 2020-08-29 RX ADMIN — OXYCODONE 10 MG: 5 TABLET ORAL at 19:17

## 2020-08-29 NOTE — PROGRESS NOTES
Dr. Ann Doherty at nurses station, informed MD of pt urine output of 165ml since delivery and dark nola. MD voiced understanding, MD to round on pt.

## 2020-08-29 NOTE — PROGRESS NOTES
Phone call to MD updated on patient status. No new orders obtained, to continue with care as planned.

## 2020-08-29 NOTE — PROGRESS NOTES
SCD removed. After complete deflating pt urinary catheter balloon. Pt moran was removed without any issues, another 60ml of urine emptied from moran. Pt  p and ambulated to bathroom. Alysha care taught and performed. Alysha pad changed (small blood noted on old pad), mesh underwear applied. Gown and linens changed. Pt ambulated back to bed and LR bolus started see MAR. Call light within reach. Explained to pt that too early to give Excedrin and encouraged pt to drink caffeine, pt voiced understanding,  No s/s of distress noted.

## 2020-08-29 NOTE — PROGRESS NOTES
SBAR IN Report: Mother    Verbal report received from Evert Matos RN (full name & credentials) on this patient, who is now being transferred from L&D (unit) for routine progression of care. The patient is wearing a green \"Anesthesia-Duramorph\" band. Report consisted of patient's Situation, Background, Assessment and Recommendations (SBAR). [place] ID bands were compared with the identification form, and verified with the patient and transferring nurse. Information from the SBAR, OR Summary, Procedure Summary, Intake/Output, MAR and Recent Results and the [place] Report was reviewed with the transferring nurse; opportunity for questions and clarification provided.

## 2020-08-29 NOTE — ANESTHESIA POSTPROCEDURE EVALUATION
Procedure(s):   SECTION. epidural    Anesthesia Post Evaluation      Multimodal analgesia: multimodal analgesia used between 6 hours prior to anesthesia start to PACU discharge  Patient location during evaluation: floor  Patient participation: complete - patient participated  Level of consciousness: awake and alert  Pain management: adequate  Airway patency: patent  Anesthetic complications: no  Cardiovascular status: acceptable  Respiratory status: acceptable  Hydration status: acceptable  Post anesthesia nausea and vomiting:  controlled  Final Post Anesthesia Temperature Assessment:  Normothermia (36.0-37.5 degrees C)      INITIAL Post-op Vital signs: No vitals data found for the desired time range.

## 2020-08-29 NOTE — PROGRESS NOTES
Crane emptied for 120ml of dark nola urine (17ml/hr since delivery) . Pt has been sleeping most of this shift. No other output documented. LR at 50ml/hr. Call placed to Dr. Tatiana Nunez informed him of the above and VS at [phone]. Per MD pt to get LR 500ml/hr bolus and hold Toradol at this time. Orders read back and verified. Orders placed.

## 2020-08-29 NOTE — PROGRESS NOTES
SBAR OUT Report: Mother    Verbal report given to Kae Fleischer (full name & credentials) on this patient, who is now being transferred to MIU (unit) for routine progression of care. The patient is wearing a green \"Anesthesia-Duramorph\" band. Report consisted of patient's Situation, Background, Assessment and Recommendations (SBAR). [facility] ID bands were compared with the identification form, and verified with the patient and receiving nurse. Information from the SBAR, OR Summary, Procedure Summary, Intake/Output and Recent Results and the [place] Report was reviewed with the receiving nurse; opportunity for questions and clarification provided.

## 2020-08-29 NOTE — ANESTHESIA PROCEDURE NOTES
Epidural Block    Start time: 8/28/2020 11:15 PM  End time: 8/28/2020 11:30 PM  Performed by: Sherif Arrieta MD  Authorized by: Sherif Arrieta MD     Pre-Procedure  Indication: at surgeon's request and labor epidural    Preanesthetic Checklist: patient identified, risks and benefits discussed, anesthesia consent, site marked, patient being monitored, timeout performed and anesthesia consent    Timeout Time: 23:15        Epidural:   Patient position:  Seated  Prep region:  Lumbar  Prep: Chlorhexidine    Location:  L4-5    Needle and Epidural Catheter:   Needle Type:  Tuohy  Needle Gauge:  17 G  Injection Technique:  Loss of resistance using saline  Attempts:  2  Catheter Size:  19 G  Catheter at Skin Depth (cm):  14.5 (MICKIE @ 11)  Events: no blood with aspiration, no cerebrospinal fluid with aspiration, no paresthesia and negative aspiration test    Test Dose:  Negative    Assessment:   Catheter Secured:  Tegaderm and tape  Insertion:  Uncomplicated  Patient tolerance:  Patient tolerated the procedure well with no immediate complications  Initial epidural appeared to not be working any more.  After discussion with the patient, decision was made to replace epidural

## 2020-08-29 NOTE — PROGRESS NOTES
Bolus completed. Approximately 75ml of dark nola urine noted in moran bag. Pt sleeping with call light in reach. No s/s of distress noted.

## 2020-08-29 NOTE — PROGRESS NOTES
Hever Baker at bedside at . CRNA at bedside at [facility] pt to sitting up on bedside at 1114. Timeout completed at [phone] with MD, AUBREE and myself at bedside. Test dose given at 1131. Negative reaction. Dose given at 1134. Pt assisted to lying back in left tilt position. See anesthesia record for details. See vital sign flow sheet for BP. Tolerated procedure well.

## 2020-08-29 NOTE — ANESTHESIA PROCEDURE NOTES
Spinal Block    Start time: 8/29/2020 3:17 AM  End time: 8/29/2020 3:25 AM  Performed by: Ivana Carrasco MD  Authorized by: Ivana Carrasco MD     Pre-procedure:   Indications: at surgeon's request and primary anesthetic  Preanesthetic Checklist: patient identified, risks and benefits discussed, anesthesia consent, site marked, patient being monitored and timeout performed    Timeout Time: 03:17          Spinal Block:   Patient Position:  Seated  Prep Region:  Lumbar  Prep: chlorhexidine      Location:  L3-4  Technique:  Single shot        Needle:   Needle Type:  Quincke (12.5 cm)  Needle Gauge:  22 G  Attempts:  1      Events: CSF confirmed, no blood with aspiration and no paresthesia        Assessment:  Insertion:  Uncomplicated  Patient tolerance:  Patient tolerated the procedure well with no immediate complications

## 2020-08-29 NOTE — PROGRESS NOTES
Admission assessment complete. Pt oriented to call light. Pt drowsy but does easily awake when spoke too. Pt states \"I am just really tired\". Bleeding precautions given, and pt instructed to call if she feels gush of blood. Pt given incentive and educated on how/why she needs to use it. Pt able to get incentive up to 2500, incentive placed on bedside table within reach of pt. Encouraged pt to increase her fluid intake. Pt denies nausea at this time. Pt denies h/a or vision changes. Offered to get pt an order for nicotine patch, pt declined stating \"I am fine right now\". Pt denies wanting pain medication at this time. Educated pt on pain medication and encouraged pt not to wait to take meds. Pt would like to rest at this time. Questions encouraged. Pt to call with needs/concerns. Pt resting in bed with call light in reach.

## 2020-08-29 NOTE — L&D DELIVERY NOTE
LOW TRANSVERSE  SECTION   FULL OP NOTE / DELIVERY NOTE    PATIENT: Latanya Perez  MRN: [id number]  DATE: 20          PREOPERATIVE DIAGNOSIS: 34yo  @ 39w0d w/ IOL d/t CHTN, Class 3 Morbid obesity w/ BMI 49.90, and polyhydrmanios    POSTOPERATIVE DIAGNOSIS: Same    PROCEDURE:  Primary low transverse  section    SURGEON: Linda Li MD    ANESTHESIA: Spinal (epidural dysfunction x 2 for pt during labor)    ESTIMATED BLOOD LOSS:   1450    SPECIMENS: Cord blood was drawn and sent, the placenta sent for disposal    FINDINGS: Live vigorous male infant with APGARS 8, 9. Normal appearing uterus, tubes and ovaries BL. Pelvis palpated with prominent sacral promentory and narrow overall. Recommend RLTCD for any future pregnancy. PROCEDURE IN DETAIL: Informed consent was obtained prior to proceeding to the operating room. The patient was taken to the operating room where spinal anesthesia was performed and found to be adequate. Time out was done to confirm the operating procedure, surgeon, patient and site. Once confirmed by the team, procedure was started. The patient was prepped and draped in the normal sterile fashion. A Pfannenstiel skin incision was made superior to the typical site due to large fold from pannus. This incision was performed with a scalpel and carried down to the underlying fascia using the Bovie. The fascial incision was extended laterally bluntly. The rectus muscles were divided in the midline bluntly. The peritoneum was entered and extended bluntly with good visualization of the bladder. A bladder blade was then inserted. A low transverse uterine incision was made with the scalpel and extended laterally with blunt finger dissection. The babys head was then delivered atraumatically. The shoulders and torso followed without difficulty. The cord was clamped and cut. The baby was handed off to the awaiting  Intensive Care Unit and respiratory staff. The placenta was then delivered. Pitocin was started IV. The uterus was exteriorized and the uterus was cleared of all clots and debris. The uterine incision was closed in two layers; the first layer was a running locked layer of 0 Vicryl and the second layer was an imbricating layer of 0 Vicryl. Bleeding was noted from the patient's left corner of the hysterotomy and this was treated with multiple figure of eights of chromic suture. Good hemostasis was assured. Paracolic gutters were washed with warm normal saline. The uterus was returned to the abdomen. The uterine incision was inspected again and found to have excellent hemostasis. The peritoneum was then closed with 2-0 Vicryl in a running fashion. The fascia was closed with 0-Vicryl in a running fashion. Good hemostasis was assured throughout. The subcuticular layers were reapproximated with 2-0 plain gut in an interrupted fashion. The skin was closed with 4-0 monocryl on a Sanjiv needle in a subcuticular fashion. The patient tolerated the procedure well. Sponge, lap, and needle counts were correct times two. The patient and infant were taken to the recovery in stable condition. COMPLICATIONS: None, other than excessive blood loss which was controlled with closure of hysterotomy    -Jae Rae MD [address] [place] [address]    Delivery Summary    Patient: Jesús Paul MRN: [id number]  SSN: [id number]    YOB: 1990  Age: 34 y.o.   Sex: female        Information for the patient's :  Kenneth Scott [[id number]]       Labor Events:    Labor: No    Steroids: None   Cervical Ripening Date/Time:       Cervical Ripening Type: Misoprostol   Antibiotics During Labor: No   Rupture Identifier:      Rupture Date/Time:       Rupture Type: AROM   Amniotic Fluid Volume: Polyhydramic    Amniotic Fluid Description: Meconium    Amniotic Fluid Odor:      Induction: Oxytocin;AROM       Induction Date/Time:        Indications for Induction: Chronic Hypertension    Augmentation:     Augmentation Date/Time:      Indications for Augmentation:     Labor complications: Anesthetic Complications       Additional complications:        Delivery Events:  Indications For Episiotomy:     Episiotomy: None   Perineal Laceration(s): None   Repaired:     Periurethral Laceration Location:      Repaired:     Labial Laceration Location:     Repaired:     Sulcal Laceration Location:     Repaired:     Vaginal Laceration Location:     Repaired:     Cervical Laceration Location:     Repaired:     Repair Suture:     Number of Repair Packets:     Estimated Blood Loss (ml):  ml   Quantitaive Blood Loss (ml):             Delivery Date: 2020    Delivery Time: 3:36 AM   Delivery Type: , Low Transverse     Details    Trial of Labor: Yes   Primary/Repeat: Primary   Priority: Emergency   Indications:  Failure to Progress       Sex:  Male     Gestational Age: 39w0d  Delivery Clinician:  Oneal Chaudhari  Living Status: Living   Delivery Location: OR            APGARS  One minute Five minutes Ten minutes   Skin color: 0   1        Heart rate: 2   2        Grimace: 2   2        Muscle tone: 2   2        Breathin   2        Totals: 8   9          Presentation: Vertex    Position:        Resuscitation Method:  Suctioning-bulb; Tactile Stimulation     Meconium Stained: Thick      Cord Information: 3 Vessels  Complications: None;Nuchal Cord Without Compressions  Cord around: head  shoulders  trunk  Delayed cord clamping? No  Cord clamped date/time:2020  3:36 AM  Disposition of Cord Blood: Lab    Blood Gases Sent?: No    Placenta:  Date/Time: 2020  3:37 AM  Removal: Manual Removal      Appearance: Normal;Intact      Measurements:  Birth Weight: 6 lb 2.8 oz (2.8 kg)      Birth Length: 50 cm      Head Circumference: 33 cm      Chest Circumference: 31 cm     Abdominal Girth:       Other Providers:   Raul PADRON;LLOYD MOORE;ANGELA BHARATHI KLEIN;LAY SAWYER;CATRACHITO HERRERA;QUENTIN MOTA;EARNESTINE MASCORRO;MICHELLE COATES;JOSLYN MURPHY, Obstetrician;Primary Nurse;Primary [place] Nurse;Neonatologist;Anesthesiologist;Crna;Scrub Tech;Scrub Tech;Respiratory Therapist             Group B Strep: No results found for: GRBSEXT, GRBSEXT  Information for the patient's :  Merilinsey Duel [[id number]]   No results found for: ABORH, PCTABR, PCTDIG, BILI, ABORHEXT, ABORH     Recent Labs     20  0354 20  0350   HCO3I 24.1 22.0   SO2I 9* 30*   IBD 3 3   SPECTI ARTERIAL CORD VENOUS CORD   ISITE CORD CORD   IDEV OTHER OTHER   IALLEN NOT APPLICABLE NOT APPLICABLE

## 2020-08-29 NOTE — LACTATION NOTE

## 2020-08-29 NOTE — PROGRESS NOTES
CTSP for IUPC being malpositioned due to patient moving around with discomfort despite epidural. New IUPC placed and tracing well. Pt now requesting . Anesthesia called to come reeval epidural which has already been replaced. Pt states that she does not want to proceed with attempt at vaginal delivery any longer and wants a section regardless of whether or not her epidural can be fixed. Risks, benefits and alternatives to  discussed at length with patient and her  at bedside. Pt expressed verbal understanding and stated that she desires to proceed with section at this time. Anesthesia notified, will notify NICU when headed to the OR.

## 2020-08-29 NOTE — LACTATION NOTE
This note was copied from a baby's chart. In to see mom and infant for firs time. Baby born early this am. Has latched and fed a few times. Mom having difficulty getting baby on herself. Reviewed football hold and showed her positioning tips. Assisted mom w/ hands on top of hers, in getting baby on deeply and wide. Took several tries as baby likes to suck on top lip. When got baby on well, likes to back off frequently so needs lots of support to stay on. Once on, fed well for 10 minutes on this side. No c/o pain. Nipple round on release. Audible swallows noted. Burped infant and then had to assist mom in her getting baby onto other breast in football. After several tries, baby got on and fed well for 15 minutes. Continue to monitor for deep latch. Mom has large nipples. During feed reviewed signs of good, wide latch and active feeding. Reviewed 1st and 2nd 24 hr feeding/output expectations. Mom appreciative of help. Lactation to follow up tomorrow.

## 2020-08-29 NOTE — PROGRESS NOTES
Call placed to Dr. Isaias Humphries, informed him that pt has put out 330ml of urine since delivery (27.5ml/hr). explained that at 1436 emptied 100ml and at 1530 emptied 65ml. Pt has still been sleeping when RN is not in room. Pt also c/o a h/a and requesting Excedrin. Per MD pt can have Excedrin when her next dose of Tylenol is due if needed. Also per MD pt to have moran removed at this time, get pt up OOB, once pt back in bed given another 500ml LR bolus then cap IV. Per MD pt can have a dose of Toradol at this time if needed. All the above orders read back and verified. Orders placed.

## 2020-08-30 LAB
HCT VFR BLD AUTO: 31.2 % (ref 35.8–46.3)
HGB BLD-MCNC: 10.2 G/DL (ref 11.7–15.4)

## 2020-08-30 PROCEDURE — 85018 HEMOGLOBIN: CPT

## 2020-08-30 PROCEDURE — 74011250637 HC RX REV CODE- 250/637: Performed by: OBSTETRICS & GYNECOLOGY

## 2020-08-30 PROCEDURE — 36415 COLL VENOUS BLD VENIPUNCTURE: CPT

## 2020-08-30 PROCEDURE — 74011250637 HC RX REV CODE- 250/637: Performed by: ANESTHESIOLOGY

## 2020-08-30 PROCEDURE — 74011250636 HC RX REV CODE- 250/636: Performed by: ANESTHESIOLOGY

## 2020-08-30 PROCEDURE — 65270000029 HC RM PRIVATE

## 2020-08-30 RX ORDER — SODIUM CHLORIDE 0.9 % (FLUSH) 0.9 %
5-40 SYRINGE (ML) INJECTION AS NEEDED
Status: DISCONTINUED | OUTPATIENT
Start: 2020-08-30 | End: 2020-08-31 | Stop reason: HOSPADM

## 2020-08-30 RX ORDER — ACETAMINOPHEN 500 MG
1000 TABLET ORAL EVERY 6 HOURS
Status: DISCONTINUED | OUTPATIENT
Start: 2020-08-30 | End: 2020-08-31 | Stop reason: HOSPADM

## 2020-08-30 RX ORDER — OXYCODONE HYDROCHLORIDE 5 MG/1
5 TABLET ORAL
Status: DISCONTINUED | OUTPATIENT
Start: 2020-08-30 | End: 2020-08-31 | Stop reason: HOSPADM

## 2020-08-30 RX ORDER — CEPHALEXIN 500 MG/1
500 CAPSULE ORAL EVERY 6 HOURS
Status: DISCONTINUED | OUTPATIENT
Start: 2020-08-30 | End: 2020-08-31 | Stop reason: HOSPADM

## 2020-08-30 RX ORDER — OXYCODONE HYDROCHLORIDE 5 MG/1
10 TABLET ORAL
Status: DISCONTINUED | OUTPATIENT
Start: 2020-08-30 | End: 2020-08-31 | Stop reason: HOSPADM

## 2020-08-30 RX ORDER — CYCLOBENZAPRINE HCL 10 MG
10 TABLET ORAL
Status: DISCONTINUED | OUTPATIENT
Start: 2020-08-30 | End: 2020-08-31 | Stop reason: HOSPADM

## 2020-08-30 RX ORDER — NALOXONE HYDROCHLORIDE 0.4 MG/ML
0.4 INJECTION, SOLUTION INTRAMUSCULAR; INTRAVENOUS; SUBCUTANEOUS AS NEEDED
Status: DISCONTINUED | OUTPATIENT
Start: 2020-08-30 | End: 2020-08-31 | Stop reason: HOSPADM

## 2020-08-30 RX ORDER — POLYETHYLENE GLYCOL 3350 17 G/17G
17 POWDER, FOR SOLUTION ORAL DAILY
Status: DISCONTINUED | OUTPATIENT
Start: 2020-08-30 | End: 2020-08-31 | Stop reason: HOSPADM

## 2020-08-30 RX ORDER — ZOLPIDEM TARTRATE 5 MG/1
5 TABLET ORAL
Status: DISCONTINUED | OUTPATIENT
Start: 2020-08-30 | End: 2020-08-31 | Stop reason: HOSPADM

## 2020-08-30 RX ORDER — SODIUM CHLORIDE 0.9 % (FLUSH) 0.9 %
5-40 SYRINGE (ML) INJECTION EVERY 8 HOURS
Status: DISCONTINUED | OUTPATIENT
Start: 2020-08-30 | End: 2020-08-31 | Stop reason: HOSPADM

## 2020-08-30 RX ORDER — SODIUM CHLORIDE, SODIUM LACTATE, POTASSIUM CHLORIDE, CALCIUM CHLORIDE 600; 310; 30; 20 MG/100ML; MG/100ML; MG/100ML; MG/100ML
125 INJECTION, SOLUTION INTRAVENOUS CONTINUOUS
Status: DISCONTINUED | OUTPATIENT
Start: 2020-08-30 | End: 2020-08-31 | Stop reason: HOSPADM

## 2020-08-30 RX ORDER — SUMATRIPTAN 25 MG/1
25 TABLET, FILM COATED ORAL
Status: DISCONTINUED | OUTPATIENT
Start: 2020-08-30 | End: 2020-08-30

## 2020-08-30 RX ORDER — DIPHENOXYLATE HYDROCHLORIDE AND ATROPINE SULFATE 2.5; .025 MG/1; MG/1
1 TABLET ORAL
Status: DISCONTINUED | OUTPATIENT
Start: 2020-08-30 | End: 2020-08-31 | Stop reason: HOSPADM

## 2020-08-30 RX ORDER — IBUPROFEN 800 MG/1
800 TABLET ORAL
Status: DISCONTINUED | OUTPATIENT
Start: 2020-08-30 | End: 2020-08-31 | Stop reason: HOSPADM

## 2020-08-30 RX ORDER — SERTRALINE HYDROCHLORIDE 50 MG/1
100 TABLET, FILM COATED ORAL DAILY
Status: DISCONTINUED | OUTPATIENT
Start: 2020-08-30 | End: 2020-08-31 | Stop reason: HOSPADM

## 2020-08-30 RX ORDER — SUMATRIPTAN 50 MG/1
50 TABLET, FILM COATED ORAL
Status: COMPLETED | OUTPATIENT
Start: 2020-08-30 | End: 2020-08-30

## 2020-08-30 RX ORDER — DOCUSATE SODIUM 100 MG/1
100 CAPSULE, LIQUID FILLED ORAL 2 TIMES DAILY
Status: DISCONTINUED | OUTPATIENT
Start: 2020-08-30 | End: 2020-08-31 | Stop reason: HOSPADM

## 2020-08-30 RX ORDER — ONDANSETRON 8 MG/1
8 TABLET, ORALLY DISINTEGRATING ORAL
Status: DISCONTINUED | OUTPATIENT
Start: 2020-08-30 | End: 2020-08-31 | Stop reason: HOSPADM

## 2020-08-30 RX ORDER — SIMETHICONE 80 MG
80 TABLET,CHEWABLE ORAL
Status: DISCONTINUED | OUTPATIENT
Start: 2020-08-30 | End: 2020-08-31 | Stop reason: HOSPADM

## 2020-08-30 RX ORDER — DIPHENHYDRAMINE HCL 25 MG
25 CAPSULE ORAL
Status: DISCONTINUED | OUTPATIENT
Start: 2020-08-30 | End: 2020-08-31 | Stop reason: HOSPADM

## 2020-08-30 RX ORDER — LORAZEPAM 2 MG/ML
1 INJECTION INTRAMUSCULAR
Status: DISCONTINUED | OUTPATIENT
Start: 2020-08-30 | End: 2020-08-31 | Stop reason: HOSPADM

## 2020-08-30 RX ORDER — IBUPROFEN 200 MG
1 TABLET ORAL DAILY
Status: DISCONTINUED | OUTPATIENT
Start: 2020-08-30 | End: 2020-08-31 | Stop reason: HOSPADM

## 2020-08-30 RX ORDER — LORAZEPAM 0.5 MG/1
0.5 TABLET ORAL
Status: DISCONTINUED | OUTPATIENT
Start: 2020-08-30 | End: 2020-08-31 | Stop reason: HOSPADM

## 2020-08-30 RX ORDER — ACETAMINOPHEN 500 MG
1000 TABLET ORAL
Status: DISCONTINUED | OUTPATIENT
Start: 2020-08-30 | End: 2020-08-30

## 2020-08-30 RX ADMIN — IBUPROFEN 800 MG: 800 TABLET, FILM COATED ORAL at 08:40

## 2020-08-30 RX ADMIN — BUSPIRONE HYDROCHLORIDE 10 MG: 5 TABLET ORAL at 08:40

## 2020-08-30 RX ADMIN — SERTRALINE HYDROCHLORIDE 100 MG: 50 TABLET ORAL at 08:40

## 2020-08-30 RX ADMIN — KETOROLAC TROMETHAMINE 30 MG: 30 INJECTION, SOLUTION INTRAMUSCULAR at 02:39

## 2020-08-30 RX ADMIN — ACETAMINOPHEN 650 MG: 325 TABLET, FILM COATED ORAL at 00:19

## 2020-08-30 RX ADMIN — POLYETHYLENE GLYCOL 3350 17 G: 17 POWDER, FOR SOLUTION ORAL at 08:40

## 2020-08-30 RX ADMIN — IBUPROFEN 800 MG: 800 TABLET, FILM COATED ORAL at 16:47

## 2020-08-30 RX ADMIN — SIMETHICONE 80 MG: 80 TABLET, CHEWABLE ORAL at 23:29

## 2020-08-30 RX ADMIN — LORATADINE 10 MG: 10 TABLET ORAL at 08:40

## 2020-08-30 RX ADMIN — DOCUSATE SODIUM 100 MG: 100 CAPSULE, LIQUID FILLED ORAL at 08:40

## 2020-08-30 RX ADMIN — LORAZEPAM 0.5 MG: 0.5 TABLET ORAL at 23:29

## 2020-08-30 RX ADMIN — LORAZEPAM 0.5 MG: 0.5 TABLET ORAL at 12:13

## 2020-08-30 RX ADMIN — DOCUSATE SODIUM 100 MG: 100 CAPSULE, LIQUID FILLED ORAL at 18:20

## 2020-08-30 RX ADMIN — SERTRALINE HYDROCHLORIDE 50 MG: 50 TABLET ORAL at 23:32

## 2020-08-30 RX ADMIN — SIMETHICONE 80 MG: 80 TABLET, CHEWABLE ORAL at 12:20

## 2020-08-30 RX ADMIN — SIMETHICONE 80 MG: 80 TABLET, CHEWABLE ORAL at 16:47

## 2020-08-30 RX ADMIN — IBUPROFEN 800 MG: 800 TABLET, FILM COATED ORAL at 23:37

## 2020-08-30 RX ADMIN — OXYCODONE 10 MG: 5 TABLET ORAL at 10:17

## 2020-08-30 RX ADMIN — SUMATRIPTAN SUCCINATE 50 MG: 50 TABLET ORAL at 06:48

## 2020-08-30 RX ADMIN — BUSPIRONE HYDROCHLORIDE 10 MG: 5 TABLET ORAL at 23:29

## 2020-08-30 RX ADMIN — ONDANSETRON 4 MG: 2 INJECTION INTRAMUSCULAR; INTRAVENOUS at 02:39

## 2020-08-30 RX ADMIN — ONDANSETRON 8 MG: 8 TABLET, ORALLY DISINTEGRATING ORAL at 08:40

## 2020-08-30 RX ADMIN — ACETAMINOPHEN 1000 MG: 500 TABLET, FILM COATED ORAL at 18:20

## 2020-08-30 RX ADMIN — CYCLOBENZAPRINE HYDROCHLORIDE 10 MG: 10 TABLET, FILM COATED ORAL at 12:13

## 2020-08-30 RX ADMIN — SIMETHICONE 80 MG: 80 TABLET, CHEWABLE ORAL at 08:40

## 2020-08-30 RX ADMIN — CEPHALEXIN 500 MG: 500 CAPSULE ORAL at 18:20

## 2020-08-30 RX ADMIN — OXYCODONE 10 MG: 5 TABLET ORAL at 06:48

## 2020-08-30 RX ADMIN — OXYCODONE 10 MG: 5 TABLET ORAL at 14:00

## 2020-08-30 RX ADMIN — OXYCODONE 10 MG: 5 TABLET ORAL at 01:50

## 2020-08-30 RX ADMIN — OXYCODONE 10 MG: 5 TABLET ORAL at 19:29

## 2020-08-30 NOTE — LACTATION NOTE
This note was copied from a baby's chart. In to see mom and infant for follow up. When came in room, mom tearful as in pain at neck, due to gas. Heating pad to mom's neck at this time and mom not able to look down and latch baby. Offered to assist mom by latching and holding baby at breast and mom appreciative of help. After a few tries got baby on deeply to right breast in football hold and he actively fed for 25 minutes. Burped infant and then he fed well for 8 minutes on left breast. Wrapped baby up and baby about to get a bath. Mom said baby has done some really good feeds today and other feeds not very long. Reviewed as baby well over 24 hrs now, if baby not latching and feed well for at least 15 minutes, encouraged her to have RN show her how to pump and can give back any extra expressed breast milk. Mom was offered formula as well at beginning of feed if did not want to put baby to breast since in pain, but mom refused. RN in room giving mom pain meds. Did review w/ mom how several of her daily medications are L3 in Medication and Mother's milk book and cumulatively could baby make sleepy. Reviewed w/ addition today of Flexeril and Ativan, MD's instructions today regarding breast feeding. Mom says Flexeril did not help and she does not plan on taking it again. Reviewed per MD order, if mom taking Ativan, should wait 2 hrs to breast feed baby. She plans to take again at [address], so discussed trying to pick baby up again at 7pm and 9pm tonight and breast feed. Parents also excited today about getting a new recliner for mom to sleep in. Reviewed danger of mom falling asleep w/ baby while breast feeding and/or with baby in recliner w/ her. Encouraged her not to never co-sleep and that safest place for baby, is for baby to be in own bassinet w/ nothing else in there. Parents verbalized understanding and mom also reviewed she does not believe in co-sleeping. CNA in to do baby bath.  No further needs at this time. Will follow up in am.

## 2020-08-30 NOTE — PROGRESS NOTES
Dr Brnuo Ramos notified per telephone of pt complaint of a migraine. Orders received for Imitrex and a nicotine patch.

## 2020-08-30 NOTE — LACTATION NOTE
This note was copied from a baby's chart. mother crying and attempting to breastfeed. Mother requesting assistance. This RN was able to get baby latched on left breast via football hold. This RN had to hold baby at breast due to mother having a h/a. Baby fed well for 13min. Baby burped. Swaddled and laying flat on back in bassinet with parents at bedside. Did not attempt right side due to mother h/a and baby asleep after feeding.

## 2020-08-30 NOTE — PROGRESS NOTES
Anesthesiology  Post-op Note    Post-op day 1 s/p  via spinal with neuraxial opioids for post-op pain management. Visit Vitals  /70 (BP 1 Location: Left arm, BP Patient Position: At rest)   Pulse 83   Temp 36.4 °C (97.5 °F)   Resp 17   Ht 5' 8.5\" (1.74 m)   Wt 152 kg (335 lb)   LMP 2019 (Exact Date)   SpO2 95%   Breastfeeding Yes   BMI 50.20 kg/m²     Airway patent, patient appropriately hydrated and appears euvolemic. Patient is sleeping comfortably. Pain is well controlled. Pruritus is well controlled. Nausea is absent. No complaints about back or site of injection. Motor and sensory function has returned to baseline in lower extremities. Patient is satisfied with anesthetic and reports no complications. Continue current orders, then initiate surgeon's orders for pain management 24 hours after . Follow up per surgeon.

## 2020-08-30 NOTE — PROGRESS NOTES
Report received from Rachid Carvajal RN care assumed. Pt resting quietly in bed with eye closed and call light within reach.  No s/s of distress noted

## 2020-08-30 NOTE — PROGRESS NOTES
1140-upon entering room pt crying and c/o a head ache and neck pain. Pt sitting up in bed attempting to breastfeed. Will call MD. Call placed to Dr. Sandro Astudillo, informed of the above and that pt has had Motrin, Shauna 10mg and Imitrex this morning. MD voiced  Understanding and will come assess pt.   1151-Dr. Sandro Astudillo at bedside. Orders received for O2 via NC at 2L be placed on pt for 30min-1hr. Can leave on longer if pt happens to fall asleep. MD to place order for Flexeril. Orders read back and verified. This RN having to hold baby at pt breast for feeding, this RN called desk for O2 tubing to be brought to room. 1156-pt placed on O2 via NC at 2L. Pt in bed with call light in reach. 1203-verbal orders received from Dr. Sandro Astudillo pt can have order for Tylenol 1000mg PO q6hr prn and can have Ativan, orders read back and verified. 1213-Flexeril 10mg and Ativan 0.5mg given. Pt in bed with call light in reach.

## 2020-08-30 NOTE — PROGRESS NOTES
Shift assessment complete see flowsheet. Upon assessment, pt IV in right hand half way out, IV removed with cath tip intact. Discussed today plan of care with pt. Bleeding precautions given. Pt encouraged to ambulate in hallway today. Pt instructed to keep track of I&O's. Pt states her h/a has improved since taking Imitrex. Pt denies her h/a getting worse upon standing or ambulating. Pt denies vision changes. Pt c/o nausea, Zofran given see MAR. Pt noted to have red area above incision, area marked, no warm noted to area. Will continue to monitor. Questions encouraged and answered. Pt to call with needs/concerns. Pt in bed with call light in reach. No s/s of distress noted.

## 2020-08-30 NOTE — PROGRESS NOTES
08/30/20 1400   Pain Assessment   Pain Scale 1 Numeric (0 - 10)   Pain Intensity 1 5   Pain Location 1 Abdomen   Pain Orientation 1 Lower   Pain Description 1 Aching; Sore   Pain Intervention(s) 1 Medication (see MAR)   Vital Signs   Level of Consciousness Alert   barb 10mg primary RN aware

## 2020-08-31 VITALS
BODY MASS INDEX: 43.4 KG/M2 | SYSTOLIC BLOOD PRESSURE: 145 MMHG | TEMPERATURE: 97.8 F | OXYGEN SATURATION: 100 % | HEART RATE: 99 BPM | HEIGHT: 69 IN | WEIGHT: 293 LBS | DIASTOLIC BLOOD PRESSURE: 79 MMHG | RESPIRATION RATE: 19 BRPM

## 2020-08-31 PROCEDURE — 74011250637 HC RX REV CODE- 250/637: Performed by: OBSTETRICS & GYNECOLOGY

## 2020-08-31 RX ORDER — ACETAMINOPHEN 500 MG
1000 TABLET ORAL EVERY 6 HOURS
Qty: 40 TAB | Refills: 0 | Status: SHIPPED | OUTPATIENT
Start: 2020-08-31 | End: 2020-09-02

## 2020-08-31 RX ORDER — OXYCODONE HYDROCHLORIDE 5 MG/1
5-10 TABLET ORAL
Qty: 20 TAB | Refills: 0 | Status: SHIPPED | OUTPATIENT
Start: 2020-08-31 | End: 2020-09-07

## 2020-08-31 RX ORDER — IBUPROFEN 800 MG/1
800 TABLET ORAL EVERY 8 HOURS
Qty: 21 TAB | Refills: 0 | Status: ON HOLD | OUTPATIENT
Start: 2020-08-31 | End: 2020-09-16

## 2020-08-31 RX ADMIN — ONDANSETRON 8 MG: 8 TABLET, ORALLY DISINTEGRATING ORAL at 07:30

## 2020-08-31 RX ADMIN — LORATADINE 10 MG: 10 TABLET ORAL at 09:08

## 2020-08-31 RX ADMIN — OXYCODONE 10 MG: 5 TABLET ORAL at 01:55

## 2020-08-31 RX ADMIN — ACETAMINOPHEN 1000 MG: 500 TABLET, FILM COATED ORAL at 09:07

## 2020-08-31 RX ADMIN — CEPHALEXIN 500 MG: 500 CAPSULE ORAL at 01:55

## 2020-08-31 RX ADMIN — POLYETHYLENE GLYCOL 3350 17 G: 17 POWDER, FOR SOLUTION ORAL at 09:08

## 2020-08-31 RX ADMIN — IBUPROFEN 800 MG: 800 TABLET, FILM COATED ORAL at 07:30

## 2020-08-31 RX ADMIN — OXYCODONE 10 MG: 5 TABLET ORAL at 07:30

## 2020-08-31 RX ADMIN — BUSPIRONE HYDROCHLORIDE 10 MG: 5 TABLET ORAL at 09:08

## 2020-08-31 RX ADMIN — DOCUSATE SODIUM 100 MG: 100 CAPSULE, LIQUID FILLED ORAL at 09:07

## 2020-08-31 RX ADMIN — OXYCODONE 10 MG: 5 TABLET ORAL at 13:04

## 2020-08-31 RX ADMIN — SIMETHICONE 80 MG: 80 TABLET, CHEWABLE ORAL at 15:39

## 2020-08-31 RX ADMIN — SIMETHICONE 80 MG: 80 TABLET, CHEWABLE ORAL at 07:30

## 2020-08-31 RX ADMIN — BUSPIRONE HYDROCHLORIDE 10 MG: 5 TABLET ORAL at 15:40

## 2020-08-31 RX ADMIN — IBUPROFEN 800 MG: 800 TABLET, FILM COATED ORAL at 13:05

## 2020-08-31 RX ADMIN — CEPHALEXIN 500 MG: 500 CAPSULE ORAL at 15:39

## 2020-08-31 RX ADMIN — CEPHALEXIN 500 MG: 500 CAPSULE ORAL at 09:08

## 2020-08-31 RX ADMIN — SERTRALINE HYDROCHLORIDE 100 MG: 50 TABLET ORAL at 09:08

## 2020-08-31 NOTE — PROGRESS NOTES
Chart reviewed - depression/anxiety; first time parent. SW met with patient and FOB while social distancing. Patient confirms history/ongoing depression/anxiety. These episodes were triggered when her mother passed away several years ago. She is currently taking Buspar, Zoloft, and Xanax. Medications are managed by Leslie Cornejo at [facility]. Discussed patient's overall mood during pregnancy. Per patient, \"It didn't effect me any worse than before I was pregnant. \"  Patient/ appear to have a good relationship as he is aware/attentive to her mental health needs. Additionally, patient/ state that they have a strong support system of family.  provided education on [facility]  Home Visit. At this time, [facility] is completing this  home visit telephonically due to social distancing. Family would like to participate in program.  Referral will be made at discharge. Psycho-education provided on  mood and anxiety disorders and EPDS score (15). Patient given informational packet on  mood & anxiety disorders (resources/education). SW recommended local therapy practice ([facility]) as they accept patient's insurance. Family denies any additional needs from  at this time. Family has 's contact information should any needs/questions arise. OB office notified of EPDS score (15) via fax.     Referral made to [facility]  home visit program.    J[address]   [phone]

## 2020-08-31 NOTE — PROGRESS NOTES
Post-Operative Day Number 2 Progress Note    Patient is post-op day 2 from  delivery. C/o persistent HA, \"Worst \" HA she has ever had. Currently is in the occipital region radiating into her neck. No change w/ position change. Taking:  Motrin, ES Excedrin and oxycodone, helps some h/w she hasn't had compete relief since it started. Post op pain is minimal, controlled on current medication. Voiding without difficulty, normal lochia. Patient Vitals for the past 24 hrs:   Temp Pulse Resp BP SpO2   20 0400 97.9 °F (36.6 °C) 94 20 119/70 98 %   20 2321 97.8 °F (36.6 °C) 94 18 116/75    20 97.6 °F (36.4 °C) 89 18 121/64 97 %   20 1519 97.7 °F (36.5 °C) (!) 112 19 149/78 95 %   20 1057 98.5 °F (36.9 °C) 91 19 132/68 98 %   20 0831 97.7 °F (36.5 °C) 90 12 143/81 98 %       Exam:  Patient without distress. Abdomen soft, fundus firm at level of umbilicus, non tender. Incision dry and clean without erythema. Lower extremities are negative for swelling, cords or tenderness.       Recent Results (from the past 48 hour(s))   HGB & HCT    Collection Time: 20  5:58 AM   Result Value Ref Range    HGB 10.2 (L) 11.7 - 15.4 g/dL    HCT 31.2 (L) 35.8 - 46.3 %       Recent Labs     20  0558 20  1901 20  1345 03/10/20  1435 01/15/20  0942   WBC  --  13.6* 14.4* 12.4* 8.4   HGB 10.2* 13.3 12.9 12.4 12.2   HCT 31.2* 39.5 38.4 36.8 36.5   PLT  --  261 283 338 312       Recent Labs     20  0940 03/10/20  1435   NA  --  138   K  --  4.4   CL  --  103   CO2  --  19*   GLU 91 122*   BUN  --  7   CREA  --  0.47*   GFRAA  --  154   GFRNA  --  134   CA  --  8.8   ALB  --  3.4*   TP  --  6.5   AGRAT  --  1.1*   AST  --  12   ALT  --  9       Recent Labs     03/10/20  1435   URICA 3.9           Recent Labs     20  0354 20  0350 20  0940 03/10/20  1435   GLU  --   --  91 122*   GLUCPOC 70 83 --   --          Problem List  Date Reviewed: 6/17/2020          Codes Class Noted    * (Principal) HTN in pregnancy, chronic ICD-10-CM: O10.919  ICD-9-CM: 642.00  8/27/2020        38 weeks gestation of pregnancy ICD-10-CM: Z3A.38  ICD-9-CM: V22.2  8/27/2020        Body mass index (BMI) of 45.0 to 49.9 in adult [facility]) ICD-10-CM: T76.65  ICD-9-CM: V85.42  8/27/2020        Encounter for planned induction of labor ICD-10-CM: Z34.90  ICD-9-CM: V22.1  8/27/2020        SGA (small for gestational age), fetal, affecting care of mother, antepartum ICD-10-CM: O36.5990  ICD-9-CM: 656.53  8/14/2020    Overview Signed 8/14/2020  9:59 AM by Joselin Benites MD       8/14/2020; 12.9%; BPP 3.4%; HC 8%; AC 12%; recommend twice weekly testing till delivery             Constipation during pregnancy in third trimester ICD-10-CM: O99.613, K59.00  ICD-9-CM: 648.93, 564.00  6/17/2020    Overview Signed 6/17/2020  1:58 PM by Deric Mcneil, BECKY     6/17/2020 at [facility]:  Patient taking Zofran daily; C/O constipation. Discussed Miralax BID, increase PO hydration, and stool softener. See High Risk Pregnancy Overview             Maternal tobacco use, antepartum ICD-10-CM: O99.330  ICD-9-CM: 649.03  5/19/2020    Overview Addendum 6/17/2020  1:45 PM by Deric Mcneil, BECKY     5/19/2020; recommend quit (especially with asthma)  6/17/2020 at [facility]:  Smoking 3 cigarettes/day (previously smoking 1 ppd). Rubella non-immune status, antepartum ICD-10-CM: O99.89, Z28.3  ICD-9-CM: 646.83, V15.83  4/15/2020        Chronic hypertension complicating or reason for care during pregnancy, third trimester ICD-10-CM: O10.913  ICD-9-CM: 642.03, 401.9  4/15/2020    Overview Addendum 6/17/2020  1:56 PM by Deric Mcneil RN     4/15/2020 [facility]: upper normal to mild range BP noted throughout last several years. Normal HELLP labs at baseline. 6/17/2020 at [facility]:  BP stable at 122/86, no preeclamptic symptoms.     See High Risk Pregnancy Overview High-risk pregnancy in third trimester ICD-10-CM: O09.93  ICD-9-CM: V23.9  2/26/2020    Overview Addendum 6/17/2020  1:58 PM by Jacquie Payne RN     2/26/2020 at [facility]:  Normal NT, NB present. Genetic counseling done by physician. Pt declines testing. 4/15/2020 at [facility]:  Normal anatomy and echo except limited by MBH, AC 28%, MARTÍN WNL. Baseline HELLP WNL, P/C ratio 0.89  6/17/2020 at [facility]:  Appropriate fetal growth, normal f/u Echo. AC 51%, overall 52%, MARTÍN 20 cm    · No follow up scheduled at [facility]; will see back prn. Obesity affecting pregnancy in third trimester ICD-10-CM: O99.213  ICD-9-CM: 649.13  2/26/2020    Overview Addendum 4/15/2020  1:26 PM by Evangelist Slater MD     2/26/2020 at [facility]:  BMI of 48. Earlky     See High Risk Pregnancy Overview             Disease of respiratory system affecting pregnancy in second trimester ICD-10-CM: O99.512  ICD-9-CM: 646.83, 519.9  2/26/2020    Overview Addendum 2/26/2020 12:33 PM by Evangelist lSater MD     2/26/2020 at [facility]:  Patient has never been diagnosed with Asthma; but was started on inhalers (Albuterol rescue, Symbicort maintenance) by allergist due to chronic allergies and cough. Pt states has never used rescue inhaler and supposed to use Symbicort daily, but only using \"maybe 1 time a week\". · Continue inhalers prn. · Seasonal allergy treatment    See High Risk Pregnancy Overview             Mental disorder affecting pregnancy in third trimester ICD-10-CM: O99.343  ICD-9-CM: 648.43  2/25/2020    Overview Addendum 6/17/2020  1:56 PM by Jacquie Payne RN     2/26/2020 at [facility]:  Patient with depression/anxiety; diagnosed in early 2017. Being managed by [facility], [facility]); follows up every 6 months- they are unaware of patient's pregnancy. Currently taking Zoloft 100mg am/50mg pm daily, Buspar 15 mg BID, and Xanax 1 mg < 1x/day. Stable on current medication regimen. Denies SI/HI.   4/15/2020 at [facility]: Pt states she is doing well. Taking xanax approx once a week. Denies SI/HI  6/17/2020 at [facility]:  Stable on Xanax 1/2 mg/week, Buspar 15 mg BID, and Zoloft 100 mg in AM/50 mg in PM.  Denies SI/HI. · Defer management to psychiatrist; next appt ~ July. Recommend seeing sooner if needed. · Try to wean off Xanax. · Continue Zoloft/Buspar, adjust prn for symptoms. See High Risk Pregnancy Overview             Medication exposure during first trimester of pregnancy ICD-10-CM: O09.891  ICD-9-CM: V23.89  2/25/2020    Overview Addendum 2/26/2020 10:16 AM by Luis Angel Gar RN     2/26/2020 at [facility]:  Patient with medication exposure to Xanax. Ordered as 4 mg BID prn. Pt states only taking 1 mg < 1x/day. · Try to wean off Xanax.     See High Risk Pregnancy Overview                     Current Facility-Administered Medications   Medication Dose Route Frequency    lactated Ringers infusion  125 mL/hr IntraVENous CONTINUOUS    sodium chloride (NS) flush 5-40 mL  5-40 mL IntraVENous Q8H    sodium chloride (NS) flush 5-40 mL  5-40 mL IntraVENous PRN    naloxone (NARCAN) injection 0.4 mg  0.4 mg IntraVENous PRN    simethicone (MYLICON) tablet 80 mg  80 mg Oral AC&HS    diphenoxylate-atropine (LOMOTIL) tablet 1 Tab  1 Tab Oral QID PRN    LORazepam (ATIVAN) injection 1 mg  1 mg IntraVENous Q6H PRN    LORazepam (ATIVAN) tablet 0.5 mg  0.5 mg Oral BID PRN    zolpidem (AMBIEN) tablet 5 mg  5 mg Oral QHS PRN    ibuprofen (MOTRIN) tablet 800 mg  800 mg Oral Q6H PRN    oxyCODONE IR (ROXICODONE) tablet 5 mg  5 mg Oral Q4H PRN    oxyCODONE IR (ROXICODONE) tablet 10 mg  10 mg Oral Q4H PRN    ondansetron (ZOFRAN ODT) tablet 8 mg  8 mg Oral Q6H PRN    docusate sodium (COLACE) capsule 100 mg  100 mg Oral BID    diphenhydrAMINE (BENADRYL) capsule 25 mg  25 mg Oral Q4H PRN    polyethylene glycol (MIRALAX) packet 17 g  17 g Oral DAILY    sertraline (ZOLOFT) tablet 100 mg  100 mg Oral DAILY    nicotine (NICODERM CQ) 21 mg/24 hr patch 1 Patch  1 Patch TransDERmal DAILY    cyclobenzaprine (FLEXERIL) tablet 10 mg  10 mg Oral TID PRN    cephALEXin (KEFLEX) capsule 500 mg  500 mg Oral Q6H    acetaminophen (TYLENOL) tablet 1,000 mg  1,000 mg Oral Q6H    albuterol-ipratropium (DUO-NEB) 2.5 MG-0.5 MG/3 ML  3 mL Nebulization Q6H PRN    budesonide-formoteroL (SYMBICORT) 160-4.5 mcg/actuation HFA inhaler 2 Puff  2 Puff Inhalation BID    busPIRone (BUSPAR) tablet 10 mg  10 mg Oral TID    loratadine (CLARITIN) tablet 10 mg  10 mg Oral DAILY    sertraline (ZOLOFT) tablet 50 mg  50 mg Oral QPM       OB History    Para Term  AB Living   2 1 1 0 1 1   SAB TAB Ectopic Molar Multiple Live Births   1 0 0 0 0 1      # Outcome Date GA Lbr Cash/2nd Weight Sex Delivery Anes PTL Lv   2 Term 20 39w0d  6 lb 2.8 oz (2.8 kg) M CS-LTranv EPI, Spinal N ANAYELI      Complications: Anesthetic Complications      Name: Helena Bailey: 8  Apgar5: 9   1 SAB                  Assessment and Plan:   POD 2:   Patient appears to be having uncomplicated post- course. Continue routine post-op care and maternal education. Wants discharge home but also wants HA relief. D/w anesthesia, Dr. Sandra Baron. Pt denies changes in HA w/ positional changes h/w reports she had 2 CLEs and a spinal in the course of her labor, may be an atypical spinal HA. Breastfeeding strategies reviewed. SIDs  precautions reviewed/    D/w pt:  Infx/driving/physical activity/pelvic rest precautions    D/w pt:  pelvic rest x 6 wk ( no sex, swimming, tampons or douching)  Pt may drive after 2-4 weeks as long as she is NOT taking narcotics & can quickly maneuver her foot from the gas to the brake. For the next 2-4 weeks:  eat, shower and nurse the baby. Advance activity as tolerated. Notify [facility] for temp > 100.5, vaginal discharge with odor, heavy VB, worsening pelvic/abdominal pain and/or other concerns.    Pt advised NOT to take otc tylenol if she is taking ES Excedrin (or any other products w/ acetaminophen in them). ABO Group   Date Value Ref Range Status   01/15/2020 O  Final     Rh (D)   Date Value Ref Range Status   01/15/2020 Positive  Final     Comment:     Please note: Prior records for this patient's ABO / Rh type are not  available for additional verification.        Rubella Ab, IgG   Date Value Ref Range Status   01/15/2020 <0.90 (L) Immune >0.99 index Final     Comment:                                     Non-immune       <0.90                                  Equivocal  0.90 - 0.99                                  Immune           >0.99

## 2020-08-31 NOTE — PROGRESS NOTES
Shift assessment complete as noted. Patient tearful and anxious. Patient continues to c/o headache and upper shoulder/neck pain. Sounds musculoskeletal in nature. Pain medication given per request.  Ice pack given for assistance with pain. Per patient, pain alleviant with medications, Oxy and Excedrin. Given an ice pack per request to place on neck. Incision well approximated. Area of slight erythema above incision, although not warm to touch. No drainage noted of incision. Steri strips in place. Encouraged use of chlorhexidine daily. Questions encouraged and answered. Encouraged to call for needs or concerns. Verbalizes understanding.

## 2020-08-31 NOTE — PROGRESS NOTES
Post-Operative Day Number 1 Progress Note    Patient doing well post-op day 1 from  delivery although has had intermittent complaint of headache that was relieved earlier today with O2 via nasal cannula & a flexeril (pt was having neck pain as well). Pain controlled on current medication. Voiding without difficulty, normal lochia. Has been intermittently tearful and has longstanding hx of anxiety/psych issues. Pt denies any SI or HI. Main complaint now is upper back pain - likely gas pain as she has only ambulated to her own RR and back to bed as of yet. Vitals:    Patient Vitals for the past 8 hrs:   BP Temp Pulse Resp SpO2   20 1519 149/78 97.7 °F (36.5 °C) (!) 112 19 95 %     Temp (24hrs), Av.9 °F (36.6 °C), Min:97.5 °F (36.4 °C), Max:98.5 °F (36.9 °C)      Vital signs stable, afebrile. Exam:  Patient without distress. Abdomen soft, fundus difficult to palpate given adiposity but is not enlarged, approp tender                Incision dry and clean with what appears to be more ecchymosis instead of erythema, area was marked when I rounded at approx 9am this morning and now has spread above the marked area. No warmth, no induration. Lower extremities are negative for swelling, cords or tenderness. Lab/Data Review:  CBC:   Lab Results   Component Value Date/Time    HGB 10.2 (L) 2020 05:58 AM    HCT 31.2 (L) 2020 05:58 AM       Assessment and Plan:  Patient appears to be having uncomplicated post- course at this time. Encouraged ambulation to help with pain. Explained gas pain. Discussed taking the tylenol alternating with the motrin and having roxicodone for break through. Nicotine patch ordered this morning in case contributing to her HA. HA does not fit with spinal HA although she did have two epidurals followed by a spinal but her complaints do not fit with spinal HA. Encouraged caffeine regardless.  Offered her O2 via nasal cannula given I have had this work with cluster HAs in the past and it did seem to calm pt. [id number] Roxana Boss for her to have intermittently if continues to help. Discouraged her from continuous use. Pt expressed understanding and agreed to ambulate. Continue routine post-op care and maternal education. Will start keflex for her incision as precaution given her risk factors for infection.

## 2020-08-31 NOTE — LACTATION NOTE
Mom and baby are going home today. Continue to offer the breast without restriction. Mom's milk should be fully in over the next few days. Reviewed engorgement precautions. Hand Expression has been demoed and written hand-out reviewed. As milk comes in baby will be more alert at the breast and swallows will be more obvious. Breasts may feel softer once baby has finished nursing. Baby should be back to birth weight by 3weeks of age. And then gain on average 1 oz per day for the next 2-3 months. Reviewed babies should be exclusively breastfeeding for the first 6 months and that breastfeeding should continue after introduction of appropriate complimentary foods after 6 months. Initial output should be at least 1 wet and 1 bowel movement for each day old baby is. By day 5-7 once milk is fully in baby will consistently have 6 or more soaking wet diapers and about 4 bowel movement. Some babies have a bowel movement with every feeding and some have 1-3 large bowel movements each day. Inadequate output may indicate inadequate feedings and should be reported to your Pediatrician. Bowel habits may change as baby gets older. Encouraged follow-up at Pediatrician in 1-2 days, no later than 1 week of age. Call [facility] for any questions as needed or to set up an OP visit. OP phone calls are returned within 24 hours. Community Breastfeeding Resource List given.

## 2020-08-31 NOTE — PROGRESS NOTES
Called Dr. Gómez Collazo at the request of the patient. Pt request to take own Excedrin extra strength for headache. Dr. Gómez Collazo stated it was okay.

## 2020-08-31 NOTE — DISCHARGE SUMMARY
Obstetrical Discharge Summary     Name: Latanya Perez MRN: [id number]  SSN: [id number]    YOB: 1990  Age: 34 y.o. Sex: female      Allergies: Latex; Augmentin [amoxicillin-pot clavulanate]; and Metronidazole    Admit Date: 2020    Discharge Date: 2020     Admitting Physician: Linda Li MD     Attending Physician:  Tung Chawla MD     * Admission Diagnoses: 38 weeks gestation of pregnancy [Z3A.38];HTN in pregnancy, chronic [O10.919]; Encounter for planned induction of labor [Z34.90]; Body mass index (BMI) of 45.0 to 49.9 in [facility]) [Z68.42]    * Discharge Diagnoses:   Information for the patient's :  Lore Cash [[id number]]   Delivery of a 6 lb 2.8 oz (2.8 kg) male infant via , Low Transverse on 2020 at 3:36 AM  by Linda Li. Apgars were 8  and 9 .        Additional Diagnoses:   Hospital Problems as of 2020 Date Reviewed: 2020          Codes Class Noted - Resolved POA    * (Principal) HTN in pregnancy, chronic ICD-10-CM: O10.919  ICD-9-CM: 642.00  2020 - Present Unknown        38 weeks gestation of pregnancy ICD-10-CM: Z3A.38  ICD-9-CM: V22.2  2020 - Present Unknown        Body mass index (BMI) of 45.0 to 49.9 in [facility]) ICD-10-CM: D51.56  ICD-9-CM: V85.42  2020 - Present Unknown        Encounter for planned induction of labor ICD-10-CM: Z34.90  ICD-9-CM: V22.1  2020 - Present Unknown             Lab Results   Component Value Date/Time    ABO/Rh(D) O POSITIVE 2020 07:01 PM      Immunization History   Administered Date(s) Administered    DTaP 1990, 1991, 1991, 1992, 1995    Hep B Vaccine 2003, 2013    Hib 1990, 1991, 1991, 1995    MMR 1992, 1995    Poliovirus vaccine 1990, 1991, 1994, 1995       * Procedures:   Procedure(s):   SECTION    * Discharge Condition: good    * Hospital Course: Normal hospital course following the delivery. * Disposition: Home    Discharge Medications:   Current Discharge Medication List      START taking these medications    Details   ibuprofen (MOTRIN) 800 mg tablet Take 1 Tab by mouth every eight (8) hours. Indications: pain  Qty: 21 Tab, Refills: 0      oxyCODONE IR (ROXICODONE) 5 mg immediate release tablet Take 1-2 Tabs by mouth every four (4) hours as needed for Pain for up to 7 days. Max Daily Amount: 60 mg.  Qty: 20 Tab, Refills: 0    Associated Diagnoses: Post-op pain         CONTINUE these medications which have CHANGED    Details   acetaminophen (TYLENOL) 500 mg tablet Take 2 Tabs by mouth every six (6) hours. Indications: pain  Qty: 40 Tab, Refills: 0    Comments: Over the counter is fine         CONTINUE these medications which have NOT CHANGED    Details   fexofenadine (ALLEGRA) 60 mg tablet Take 1 Tab by mouth two (2) times a day. Qty: 60 Tab, Refills: 3      cetirizine (ZYRTEC) 10 mg tablet Take 1 Tab by mouth daily. Qty: 30 Tab, Refills: 3      ondansetron (ZOFRAN ODT) 4 mg disintegrating tablet Take 1 Tab by mouth every eight (8) hours as needed for Nausea or Vomiting. Qty: 120 Tab, Refills: 1      calcium carbonate (TUMS) 200 mg calcium (500 mg) chew Take 1 Tab by mouth daily. prenatal vits62/FA/om3/dha/epa (PRENATAL GUMMY PO) Take  by mouth. sertraline (ZOLOFT) 100 mg tablet TAKE 1 TABLET BY MOUTH EVERY DAY  Qty: 90 Tab, Refills: 1    Associated Diagnoses: DEMETRIO (generalized anxiety disorder)      budesonide-formoterol (SYMBICORT) 160-4.5 mcg/actuation HFAA Take 2 Puffs by inhalation two (2) times a day. Qty: 1 Inhaler, Refills: 5    Associated Diagnoses: Cough      albuterol (PROVENTIL HFA, VENTOLIN HFA, PROAIR HFA) 90 mcg/actuation inhaler Take 2 Puffs by inhalation every six (6) hours as needed for Wheezing.   Qty: 1 Inhaler, Refills: 2         STOP taking these medications       ALPRAZolam (XANAX) 1 mg tablet Comments:   Reason for Stopping:         busPIRone (BUSPAR) 10 mg tablet Comments:   Reason for Stopping:         aspirin/acetaminophen/caffeine (EXCEDRIN MIGRAINE PO) Comments:   Reason for Stopping:               * Follow-up Care/Patient Instructions:   Activity: No lifting, Driving, or Strenuous exercise for 2-4 weeks, No sex for 6 weeks and No driving while on analgesics  Diet: Regular Diet  Wound Care: As directed    Follow-up Information     Follow up With Specialties Details Why Contact Info    Delores Phillips MD [facility]   [address] [place] [address]  [phone]

## 2020-08-31 NOTE — LACTATION NOTE
In to follow up with mom and infant prior to discharge to home. Infant circumcised just prior to my visit. Infant was asleep in mom's arms. Reviewed discharge information. Mom wanted me to observe and instruct her on how to latch infant and \"keep\" infant's arms out of his face. Observed and reviewed with mom ways to prevent infant from getting his hands in the way. Answered mom's questions. Mom and infant are following up with [facility] and will see lactation consultant there.

## 2020-08-31 NOTE — PROGRESS NOTES
I was asked to see Ms. Mtz by Dr. Nahum Kamara to evaluate her for possible PDPH. Pt had a clinical course complicated by difficult epidural placement (no note of wet tap), repeat epidural about 12 hours later (no note of wet tap), and then a spinal approximately 4 hours later for  with a 22 ga Quincke needle. She reports the onset of her HA shortly after the first epidural.  It is mostly occipital.  She had some neck stiffness yesterday but that has improved today. She denies diplopia but does note occasional muffling of sounds. There is no positional component. When I walked into the room she was smiling and sitting completely upright with her legs over the side of her bed and on the floor. I discussed the mechanism of PDPH and both conservative and invasive (blood patch) treatment options. She was strongly against blood patch at this time and I agree with her since she is able to quite easily attend to her ADLs. I encouraged her to drink lots of fluids, including caffeine, to rest, and take oral analgesics. I reminded her that we are always here to reevaluate her if needed.

## 2020-08-31 NOTE — PROGRESS NOTES
Spoke w/ pt and her  re home meds. Pt advised that Xanax 4 mg is a high dose and would likely be sedating for the baby. Advised her to notify her pediatrician if she it taking this and breastfeeding. Per pt she is down to 0.5 mg Xanax and only takes it once a week prn. Likewise recommendations re Buspar reviewed. Buspar \"caution advised while breast-feeding; no known risk of infant harm based on limited human data and drug properties; no human data available to assess effects on milk production\". Patient advised to notify her pediatrician if she is taking this & breast-feeding. Per RN the pediatrician is aware and has made recommendations regarding this. Encouraged patient to discuss BuSpar with her psychiatrist at [facility] or with her PA, Sebastián Guevara. Patient advised Zoloft is considered safe for breast-feeding, it is \"the SSRI of choice while breast-feeding\". SIDS precautions reviewed. Importance of \"back to sleep\" stressed. Patient advised not to expose the baby to second-hand smoke. In addition, the protective advantage of pacifiers was discussed. Patient is aware that positioning her infant on his tummy is thought to be a major risk factor for SIDS and has been advised to discuss this issue further with her pediatrician. Pt advised I am unaware of the current research recommendations regarding side sleeping with respect to minimizing SIDS risk/improved safety. Of note babies that sleep on their sides tend to roll to their tummy at a much younger age and often do not want to resume back positioning. Suggested she discuss side sleeping further with her pediatrician.

## 2020-08-31 NOTE — PROGRESS NOTES
Dr. Anupama Mae calls to [address] [facility] to speak with RN regarding patient current medication list and the possible affects on infant from breastfeeding. Dr. Anupama Mae requesting RN make sure to include information regarding risks associated with Xanax, Buspar, and Zoloft use while breastfeeding as they are L3 medications. This RN reads Pediatrician note and education provided by Pediatrician today regarding above medications and risks during breastfeeding with Dr. Anupama Mae. Dr. Anupama Mae plans to return to bedside to speak with patient to confirm understanding of above information. This RN will educate patient as well during discharge teaching.

## 2020-09-02 ENCOUNTER — APPOINTMENT (OUTPATIENT)
Dept: MRI IMAGING | Age: 30
End: 2020-09-02
Attending: OBSTETRICS & GYNECOLOGY
Payer: COMMERCIAL

## 2020-09-02 ENCOUNTER — HOSPITAL ENCOUNTER (OUTPATIENT)
Age: 30
Discharge: HOME | End: 2020-09-02
Attending: OBSTETRICS & GYNECOLOGY | Admitting: OBSTETRICS & GYNECOLOGY
Payer: COMMERCIAL

## 2020-09-02 ENCOUNTER — APPOINTMENT (OUTPATIENT)
Dept: CT IMAGING | Age: 30
End: 2020-09-02
Attending: OBSTETRICS & GYNECOLOGY
Payer: COMMERCIAL

## 2020-09-02 VITALS
DIASTOLIC BLOOD PRESSURE: 71 MMHG | HEART RATE: 86 BPM | TEMPERATURE: 98.2 F | RESPIRATION RATE: 16 BRPM | SYSTOLIC BLOOD PRESSURE: 132 MMHG

## 2020-09-02 DIAGNOSIS — R51.9 ACUTE INTRACTABLE HEADACHE, UNSPECIFIED HEADACHE TYPE: Primary | ICD-10-CM

## 2020-09-02 LAB
ALBUMIN SERPL-MCNC: 1.9 G/DL (ref 3.5–5)
ALBUMIN/GLOB SERPL: 0.4 {RATIO} (ref 1.2–3.5)
ALP SERPL-CCNC: 128 U/L (ref 50–136)
ALT SERPL-CCNC: 17 U/L (ref 12–65)
ANION GAP SERPL CALC-SCNC: 5 MMOL/L (ref 7–16)
AST SERPL-CCNC: 23 U/L (ref 15–37)
BASOPHILS # BLD: 0 K/UL (ref 0–0.2)
BASOPHILS NFR BLD: 0 % (ref 0–2)
BILIRUB SERPL-MCNC: 0.2 MG/DL (ref 0.2–1.1)
BUN SERPL-MCNC: 9 MG/DL (ref 6–23)
CALCIUM SERPL-MCNC: 8.8 MG/DL (ref 8.3–10.4)
CHLORIDE SERPL-SCNC: 109 MMOL/L (ref 98–107)
CO2 SERPL-SCNC: 28 MMOL/L (ref 21–32)
CREAT SERPL-MCNC: 0.65 MG/DL (ref 0.6–1)
DIFFERENTIAL METHOD BLD: ABNORMAL
EOSINOPHIL # BLD: 0.2 K/UL (ref 0–0.8)
EOSINOPHIL NFR BLD: 2 % (ref 0.5–7.8)
ERYTHROCYTE [DISTWIDTH] IN BLOOD BY AUTOMATED COUNT: 13.6 % (ref 11.9–14.6)
GLOBULIN SER CALC-MCNC: 4.4 G/DL (ref 2.3–3.5)
GLUCOSE SERPL-MCNC: 93 MG/DL (ref 65–100)
HCT VFR BLD AUTO: 26.7 % (ref 35.8–46.3)
HGB BLD-MCNC: 8.5 G/DL (ref 11.7–15.4)
IMM GRANULOCYTES # BLD AUTO: 0.1 K/UL (ref 0–0.5)
IMM GRANULOCYTES NFR BLD AUTO: 1 % (ref 0–5)
LDH SERPL L TO P-CCNC: 181 U/L (ref 100–190)
LYMPHOCYTES # BLD: 1.8 K/UL (ref 0.5–4.6)
LYMPHOCYTES NFR BLD: 14 % (ref 13–44)
MCH RBC QN AUTO: 28.6 PG (ref 26.1–32.9)
MCHC RBC AUTO-ENTMCNC: 31.8 G/DL (ref 31.4–35)
MCV RBC AUTO: 89.9 FL (ref 79.6–97.8)
MONOCYTES # BLD: 1.1 K/UL (ref 0.1–1.3)
MONOCYTES NFR BLD: 8 % (ref 4–12)
NEUTS SEG # BLD: 9.4 K/UL (ref 1.7–8.2)
NEUTS SEG NFR BLD: 75 % (ref 43–78)
NRBC # BLD: 0 K/UL (ref 0–0.2)
PLATELET # BLD AUTO: 287 K/UL (ref 150–450)
PMV BLD AUTO: 10.9 FL (ref 9.4–12.3)
POTASSIUM SERPL-SCNC: 4.4 MMOL/L (ref 3.5–5.1)
PROT SERPL-MCNC: 6.3 G/DL (ref 6.3–8.2)
RBC # BLD AUTO: 2.97 M/UL (ref 4.05–5.2)
SODIUM SERPL-SCNC: 142 MMOL/L (ref 136–145)
URATE SERPL-MCNC: 5.4 MG/DL (ref 2.6–6)
WBC # BLD AUTO: 12.6 K/UL (ref 4.3–11.1)

## 2020-09-02 PROCEDURE — A9575 INJ GADOTERATE MEGLUMI 0.1ML: HCPCS | Performed by: OBSTETRICS & GYNECOLOGY

## 2020-09-02 PROCEDURE — 74011250637 HC RX REV CODE- 250/637: Performed by: OBSTETRICS & GYNECOLOGY

## 2020-09-02 PROCEDURE — 70553 MRI BRAIN STEM W/O & W/DYE: CPT

## 2020-09-02 PROCEDURE — 81002 URINALYSIS NONAUTO W/O SCOPE: CPT | Performed by: OBSTETRICS & GYNECOLOGY

## 2020-09-02 PROCEDURE — 85025 COMPLETE CBC W/AUTO DIFF WBC: CPT

## 2020-09-02 PROCEDURE — 99218 HC RM OBSERVATION: CPT

## 2020-09-02 PROCEDURE — 99285 EMERGENCY DEPT VISIT HI MDM: CPT

## 2020-09-02 PROCEDURE — 80053 COMPREHEN METABOLIC PANEL: CPT

## 2020-09-02 PROCEDURE — 74011250636 HC RX REV CODE- 250/636: Performed by: OBSTETRICS & GYNECOLOGY

## 2020-09-02 PROCEDURE — 84550 ASSAY OF BLOOD/URIC ACID: CPT

## 2020-09-02 PROCEDURE — 83615 LACTATE (LD) (LDH) ENZYME: CPT

## 2020-09-02 RX ORDER — BUTALBITAL, ACETAMINOPHEN, CAFFEINE AND CODEINE PHOSPHATE 50; 325; 40; 30 MG/1; MG/1; MG/1; MG/1
1 CAPSULE ORAL
Qty: 12 CAP | Refills: 0 | Status: ON HOLD | OUTPATIENT
Start: 2020-09-02 | End: 2020-09-03 | Stop reason: SDUPTHER

## 2020-09-02 RX ORDER — CYCLOBENZAPRINE HCL 10 MG
5 TABLET ORAL
Status: COMPLETED | OUTPATIENT
Start: 2020-09-02 | End: 2020-09-02

## 2020-09-02 RX ORDER — BUTALBITAL, ACETAMINOPHEN AND CAFFEINE 50; 325; 40 MG/1; MG/1; MG/1
1 TABLET ORAL
Status: COMPLETED | OUTPATIENT
Start: 2020-09-02 | End: 2020-09-02

## 2020-09-02 RX ORDER — OXYCODONE HYDROCHLORIDE 5 MG/1
5 TABLET ORAL
Status: COMPLETED | OUTPATIENT
Start: 2020-09-02 | End: 2020-09-02

## 2020-09-02 RX ORDER — SUMATRIPTAN 50 MG/1
50 TABLET, FILM COATED ORAL
Status: COMPLETED | OUTPATIENT
Start: 2020-09-02 | End: 2020-09-02

## 2020-09-02 RX ORDER — GADOTERATE MEGLUMINE 376.9 MG/ML
30 INJECTION INTRAVENOUS
Status: COMPLETED | OUTPATIENT
Start: 2020-09-02 | End: 2020-09-02

## 2020-09-02 RX ORDER — SODIUM CHLORIDE 0.9 % (FLUSH) 0.9 %
10 SYRINGE (ML) INJECTION
Status: COMPLETED | OUTPATIENT
Start: 2020-09-02 | End: 2020-09-02

## 2020-09-02 RX ADMIN — CYCLOBENZAPRINE 5 MG: 10 TABLET, FILM COATED ORAL at 13:19

## 2020-09-02 RX ADMIN — Medication 10 ML: at 16:35

## 2020-09-02 RX ADMIN — OXYCODONE 5 MG: 5 TABLET ORAL at 18:47

## 2020-09-02 RX ADMIN — SUMATRIPTAN SUCCINATE 50 MG: 50 TABLET ORAL at 13:21

## 2020-09-02 RX ADMIN — BUTALBITAL, ACETAMINOPHEN AND CAFFEINE 1 TABLET: 50; 325; 40 TABLET ORAL at 13:19

## 2020-09-02 RX ADMIN — GADOTERATE MEGLUMINE 30 ML: 376.9 INJECTION INTRAVENOUS at 16:35

## 2020-09-02 NOTE — PROGRESS NOTES
Pt to [facility] with c/o headache. C/S on 8/29/20. Reports she has had a headache since being discharge on Friday. Concerned she may have a spinal headache. Reports she had two failed epidurals. VSS. Dr. Elkin Murray aware.

## 2020-09-02 NOTE — PROGRESS NOTES
Waiting on MRI results. Very EMOTIONAL. Pt is very upset crying and stating she just wants to go home and she feels horrible. States her whole body is sore. RN will call Dr. Nora Cramer for pain medication orders.

## 2020-09-02 NOTE — DISCHARGE INSTRUCTIONS

## 2020-09-02 NOTE — PROGRESS NOTES
13:19  Medication Given  butalbital-acetaminophen-caffeine (FIORICET, ESGIC) -40 mg per tablet 1 Tab -  Dose: 1 Tab ; Route: Oral ; Scheduled Time: 1400  Saba Mesa RN    13:19  Medication Given  cyclobenzaprine (FLEXERIL) tablet 5 mg -  Dose: 5 mg ; Route: Oral ; Scheduled Time: 1400  Saba Mesa RN    13:21  Medication Given  SUMAtriptan (IMITREX) tablet 50 mg -  Dose: 50 mg ; Route: Oral ; Scheduled Time: 1400

## 2020-09-02 NOTE — PROGRESS NOTES
[place]  scale completed. Score of 15. Paper copy to  for follow up. Pt currently taking Zoloft 150 mg. Instructed to call MD tomorrow for f/u on medications for anxiety and PPD. States she see a psychiatrist later this month.

## 2020-09-02 NOTE — H&P
Chief Complaint   Patient presents with    Headache     PP       34 y.o. female  ppd 4 s/p c/s   who requests evaluation for headache. Pt had 2 failed epidurals then a spinal for c/s. Had ha postpartum which patient states was constant, not relieved with excedrin migraine which helped migraines in past, and states she went home with headche. Says pain is constant, not positional, frontal and hat band in distribution, also radiates down back of her neck, and associated with constant ringing in her ears and sounds being muffled. No particular vision changes. Per hospital notes her ha was better after o2 and flexeril. Was seen by anesthesia for possible PDPH but per note she declined blood patch. At home patient is just drinking water and one coke per day. Ha not altered by tylenol, ibuprofen, oxycodone. Her pregnancy issues included: obesity, history of chronic htn not on meds. States pain 10/10.       HISTORY:  OB History    Para Term  AB Living   2 1 1 0 1 1   SAB TAB Ectopic Molar Multiple Live Births   1 0 0 0 0 1      # Outcome Date GA Lbr Cash/2nd Weight Sex Delivery Anes PTL Lv   2 Term 20 39w0d  2.8 kg M CS-LTranv EPI, Spinal N ANAYELI      Complications: Anesthetic Complications   1 SAB 2616               Past Surgical History:   Procedure Laterality Date    HX CHOLECYSTECTOMY      HX WISDOM TEETH EXTRACTION      16-14 years old       Past Medical History:   Diagnosis Date    Abnormal weight gain 10/15/2015    Anxiety     Chronic hypertension complicating or reason for care during pregnancy, second trimester 4/15/2020    Depression     Frequent headaches 10/15/2015    Striae purple 10/15/2015       Allergies   Allergen Reactions    Latex Rash    Augmentin [Amoxicillin-Pot Clavulanate] Rash    Metronidazole Hives and Itching       Family History   Problem Relation Age of Onset    Kidney Disease Mother         stones    Breast Cancer Mother     Diabetes Father    [facility] Arthritis-osteo Father     Thyroid Disease Maternal Grandmother     Hypertension Neg Hx        Social History     Socioeconomic History    Marital status:      Spouse name: Not on file    Number of children: Not on file    Years of education: Not on file    Highest education level: Not on file   Occupational History    Not on file   Social Needs    Financial resource strain: Not on file    Food insecurity     Worry: Not on file     Inability: Not on file    Transportation needs     Medical: Not on file     Non-medical: Not on file   Tobacco Use    Smoking status: Current Every Day Smoker     Packs/day: 0.50     Years: 13.00     Pack years: 6.50     Types: Cigarettes    Smokeless tobacco: Never Used   Substance and Sexual Activity    Alcohol use: Not Currently     Alcohol/week: 0.0 standard drinks     Comment: occ    Drug use: Never    Sexual activity: Yes     Partners: Male     Birth control/protection: None   Lifestyle    Physical activity     Days per week: Not on file     Minutes per session: Not on file    Stress: Not on file   Relationships    Social connections     Talks on phone: Not on file     Gets together: Not on file     Attends Caodaism service: Not on file     Active member of club or organization: Not on file     Attends meetings of clubs or organizations: Not on file     Relationship status: Not on file    Intimate partner violence     Fear of current or ex partner: Not on file     Emotionally abused: Not on file     Physically abused: Not on file     Forced sexual activity: Not on file   Other Topics Concern     Service Not Asked    Blood Transfusions Not Asked    Caffeine Concern Not Asked    Occupational Exposure Not Asked   Danay Acosta Hazards Not Asked    Sleep Concern Not Asked    Stress Concern Not Asked    Weight Concern Not Asked    Special Diet Not Asked    Back Care Not Asked    Exercise Not Asked    Bike Helmet Not Asked   [address] Not Asked  Self-Exams Not Asked   Social History Narrative     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 3 dogs--husky, lab, and pit bull-pointer mix. ROS:  Negative:   negative 10 point ROS except as noted in HPI    Positive:   per hpi    PHYSICAL EXAM:  Blood pressure 132/71, pulse 86, temperature 98.2 °F (36.8 °C), resp. rate 16, last menstrual period 11/24/2019, currently breastfeeding. The patient looks fatigued, oriented x 3. Appropriate affect. Morbidly obese  Lungs are clear. Heart RRR, no murmurs. Abdomen soft, non-tender, no rebound/guarding, normoactive bs. Fundus soft and non tender  Skin warm, dry, no rashes  Ext 3+edema, DTR's normal    I have personally reviewed the patient's history, prenatal record, and pertinent test results. vital sign trends, radiology reports, laboratory results, previous provider notes support my clinical impression. Assessment:  34 y.o. female at 39w0d  postpartum headache. possible migraine. Plan:  Get labs, medicate with fiorcet, imitrex, flexeril. Given pp status and severity of ha per patient, check head ct to r/o thrombosis. If neg consult anesthesia for further recommendations. Signed By:  Мария Monroe MD     September 2, 2020      Addendum:  MRI Results (most recent):  Results from [place] encounter on 09/02/20   MRI BRAIN W WO CONT    Narrative MRI BRAIN WITHOUT AND WITH CONTRAST 9/2/2020    HISTORY: Postpartum headache and neck pain. Evaluate for thrombosis. TECHNIQUE: Sagittal and axial T1-weighted, axial T2-weighted, axial FLAIR, axial  T2-weighted gradient-echo, axial diffusion weighted images with ADC maps and  postcontrast axial and coronal T1-weighted images of the brain. 30cc of Dotarem  gadolinium contrast was administered intravenously without adverse event to  evaluate for pathological leptomeningeal or parenchymal enhancement.     COMPARISON: None    FINDINGS: Appropriate flow voids are present in the large dural venous sinuses,  suggesting patency of these vessels. There is diffuse enlargement of the dural  sinuses along with smooth, thickened pachymeningeal enhancement on postcontrast  images and prominence of the pituitary gland. This constellation of findings can  be present in the setting of intracranial hypotension. The cerebellar tonsils are in a normal position. There is no acute infarction, acute intracranial hemorrhage, intra-axial mass,  hydrocephalus, or extra-axial hematoma. The mastoid air cells and paranasal  sinuses are clear where imaged. Impression IMPRESSION: Dural sinus engorgement with smooth pachymeningeal enhancement. These findings may be present with intracranial hypotension as may occur in the  setting of a CSF leak. Discussed results with patient. She declines consult with anesthesia. Scored 15 on pp depression scale. Already on zoloft. Will discharge on fioricet. Strong recommendation to limit acitivity, maintain stable caffeine intake, increase fluid intake, and to try to rest/sleep. Call office in am to discuss status and also to discuss depression meds and fu assessment. Kristine D. Marylene Drones MD

## 2020-09-03 ENCOUNTER — TELEPHONE (OUTPATIENT)
Dept: CASE MANAGEMENT | Age: 30
End: 2020-09-03

## 2020-09-03 ENCOUNTER — HOSPITAL ENCOUNTER (OUTPATIENT)
Age: 30
Discharge: HOME | End: 2020-09-03
Attending: OBSTETRICS & GYNECOLOGY | Admitting: OBSTETRICS & GYNECOLOGY
Payer: COMMERCIAL

## 2020-09-03 VITALS
HEART RATE: 87 BPM | RESPIRATION RATE: 18 BRPM | SYSTOLIC BLOOD PRESSURE: 139 MMHG | DIASTOLIC BLOOD PRESSURE: 88 MMHG | TEMPERATURE: 98.2 F

## 2020-09-03 DIAGNOSIS — R51.9 ACUTE INTRACTABLE HEADACHE, UNSPECIFIED HEADACHE TYPE: ICD-10-CM

## 2020-09-03 LAB
ALBUMIN SERPL-MCNC: 2 G/DL (ref 3.5–5)
ALBUMIN/GLOB SERPL: 0.4 {RATIO} (ref 1.2–3.5)
ALP SERPL-CCNC: 138 U/L (ref 50–136)
ALT SERPL-CCNC: 25 U/L (ref 12–65)
ANION GAP SERPL CALC-SCNC: 7 MMOL/L (ref 7–16)
AST SERPL-CCNC: 54 U/L (ref 15–37)
BASOPHILS # BLD: 0 K/UL (ref 0–0.2)
BASOPHILS NFR BLD: 0 % (ref 0–2)
BILIRUB SERPL-MCNC: 0.3 MG/DL (ref 0.2–1.1)
BUN SERPL-MCNC: 9 MG/DL (ref 6–23)
CALCIUM SERPL-MCNC: 8.6 MG/DL (ref 8.3–10.4)
CHLORIDE SERPL-SCNC: 107 MMOL/L (ref 98–107)
CO2 SERPL-SCNC: 26 MMOL/L (ref 21–32)
CREAT SERPL-MCNC: 0.72 MG/DL (ref 0.6–1)
DIFFERENTIAL METHOD BLD: ABNORMAL
EOSINOPHIL # BLD: 0.1 K/UL (ref 0–0.8)
EOSINOPHIL NFR BLD: 1 % (ref 0.5–7.8)
ERYTHROCYTE [DISTWIDTH] IN BLOOD BY AUTOMATED COUNT: 13.2 % (ref 11.9–14.6)
GLOBULIN SER CALC-MCNC: 4.8 G/DL (ref 2.3–3.5)
GLUCOSE SERPL-MCNC: 86 MG/DL (ref 65–100)
GLUCOSE, GLUUPC: NEGATIVE
HCT VFR BLD AUTO: 28.2 % (ref 35.8–46.3)
HGB BLD-MCNC: 9.1 G/DL (ref 11.7–15.4)
IMM GRANULOCYTES # BLD AUTO: 0.1 K/UL (ref 0–0.5)
IMM GRANULOCYTES NFR BLD AUTO: 1 % (ref 0–5)
KETONES UR-MCNC: NORMAL MG/DL
LDH SERPL L TO P-CCNC: 204 U/L (ref 100–190)
LYMPHOCYTES # BLD: 1.2 K/UL (ref 0.5–4.6)
LYMPHOCYTES NFR BLD: 9 % (ref 13–44)
MCH RBC QN AUTO: 29.2 PG (ref 26.1–32.9)
MCHC RBC AUTO-ENTMCNC: 32.3 G/DL (ref 31.4–35)
MCV RBC AUTO: 90.4 FL (ref 79.6–97.8)
MONOCYTES # BLD: 1 K/UL (ref 0.1–1.3)
MONOCYTES NFR BLD: 8 % (ref 4–12)
NEUTS SEG # BLD: 10.4 K/UL (ref 1.7–8.2)
NEUTS SEG NFR BLD: 82 % (ref 43–78)
NRBC # BLD: 0 K/UL (ref 0–0.2)
PLATELET # BLD AUTO: 350 K/UL (ref 150–450)
PMV BLD AUTO: 11 FL (ref 9.4–12.3)
POTASSIUM SERPL-SCNC: 4.1 MMOL/L (ref 3.5–5.1)
PROT SERPL-MCNC: 6.8 G/DL (ref 6.3–8.2)
PROT UR QL: NORMAL
RBC # BLD AUTO: 3.12 M/UL (ref 4.05–5.2)
SODIUM SERPL-SCNC: 140 MMOL/L (ref 136–145)
URATE SERPL-MCNC: 5.4 MG/DL (ref 2.6–6)
WBC # BLD AUTO: 12.7 K/UL (ref 4.3–11.1)

## 2020-09-03 PROCEDURE — 84550 ASSAY OF BLOOD/URIC ACID: CPT

## 2020-09-03 PROCEDURE — 83615 LACTATE (LD) (LDH) ENZYME: CPT

## 2020-09-03 PROCEDURE — 85025 COMPLETE CBC W/AUTO DIFF WBC: CPT

## 2020-09-03 PROCEDURE — 74011250637 HC RX REV CODE- 250/637: Performed by: OBSTETRICS & GYNECOLOGY

## 2020-09-03 PROCEDURE — 96372 THER/PROPH/DIAG INJ SC/IM: CPT | Performed by: OBSTETRICS & GYNECOLOGY

## 2020-09-03 PROCEDURE — 99282 EMERGENCY DEPT VISIT SF MDM: CPT | Performed by: OBSTETRICS & GYNECOLOGY

## 2020-09-03 PROCEDURE — 96361 HYDRATE IV INFUSION ADD-ON: CPT | Performed by: OBSTETRICS & GYNECOLOGY

## 2020-09-03 PROCEDURE — 80053 COMPREHEN METABOLIC PANEL: CPT

## 2020-09-03 PROCEDURE — 96375 TX/PRO/DX INJ NEW DRUG ADDON: CPT | Performed by: OBSTETRICS & GYNECOLOGY

## 2020-09-03 PROCEDURE — 74011250636 HC RX REV CODE- 250/636: Performed by: OBSTETRICS & GYNECOLOGY

## 2020-09-03 PROCEDURE — 74011000250 HC RX REV CODE- 250: Performed by: OBSTETRICS & GYNECOLOGY

## 2020-09-03 PROCEDURE — 96360 HYDRATION IV INFUSION INIT: CPT | Performed by: OBSTETRICS & GYNECOLOGY

## 2020-09-03 RX ORDER — BUTALBITAL, ACETAMINOPHEN, CAFFEINE AND CODEINE PHOSPHATE 50; 325; 40; 30 MG/1; MG/1; MG/1; MG/1
1 CAPSULE ORAL
Qty: 16 CAP | Refills: 0 | Status: SHIPPED | OUTPATIENT
Start: 2020-09-03 | End: 2020-09-08

## 2020-09-03 RX ORDER — SODIUM CHLORIDE 9 MG/ML
150 INJECTION, SOLUTION INTRAVENOUS CONTINUOUS
Status: DISCONTINUED | OUTPATIENT
Start: 2020-09-03 | End: 2020-09-03 | Stop reason: HOSPADM

## 2020-09-03 RX ORDER — BUTALBITAL, ACETAMINOPHEN AND CAFFEINE 50; 325; 40 MG/1; MG/1; MG/1
1 TABLET ORAL
Status: COMPLETED | OUTPATIENT
Start: 2020-09-03 | End: 2020-09-03

## 2020-09-03 RX ORDER — HYDROMORPHONE HYDROCHLORIDE 1 MG/ML
1 INJECTION, SOLUTION INTRAMUSCULAR; INTRAVENOUS; SUBCUTANEOUS ONCE
Status: COMPLETED | OUTPATIENT
Start: 2020-09-03 | End: 2020-09-03

## 2020-09-03 RX ORDER — KETOROLAC TROMETHAMINE 30 MG/ML
30 INJECTION, SOLUTION INTRAMUSCULAR; INTRAVENOUS
Status: COMPLETED | OUTPATIENT
Start: 2020-09-03 | End: 2020-09-03

## 2020-09-03 RX ADMIN — SODIUM CHLORIDE 150 ML/HR: 900 INJECTION, SOLUTION INTRAVENOUS at 15:54

## 2020-09-03 RX ADMIN — BUTALBITAL, ACETAMINOPHEN AND CAFFEINE 1 TABLET: 50; 325; 40 TABLET ORAL at 17:08

## 2020-09-03 RX ADMIN — KETOROLAC TROMETHAMINE 30 MG: 30 INJECTION, SOLUTION INTRAMUSCULAR; INTRAVENOUS at 16:00

## 2020-09-03 RX ADMIN — HYDROMORPHONE HYDROCHLORIDE 1 MG: 1 INJECTION, SOLUTION INTRAMUSCULAR; INTRAVENOUS; SUBCUTANEOUS at 15:50

## 2020-09-03 RX ADMIN — SODIUM CHLORIDE 150 ML/HR: 900 INJECTION, SOLUTION INTRAVENOUS at 14:45

## 2020-09-03 RX ADMIN — PROMETHAZINE HYDROCHLORIDE 12.5 MG: 25 INJECTION INTRAMUSCULAR; INTRAVENOUS at 15:50

## 2020-09-03 NOTE — ED PROVIDER NOTES
Chief Complaint: headache, nausea and vomiting      34 y.o. female  who is 5 days postpartum with a headache. Pt reports headache is so severe that she is vomiting. Pt was seen last night and had labs and an MRI. She appears to have a spinal headache. She slept well last night without pain, but pain returned when she woke up. She has not yet filled her prescription for fioricet. No visual changes. No neuro changes other than headache. HISTORY:    Social History     Substance and Sexual Activity   Sexual Activity Yes    Partners: Male    Birth control/protection: None     Patient's last menstrual period was 2019 (exact date).     Social History     Socioeconomic History    Marital status:      Spouse name: Not on file    Number of children: Not on file    Years of education: Not on file    Highest education level: Not on file   Occupational History    Not on file   Social Needs    Financial resource strain: Not on file    Food insecurity     Worry: Not on file     Inability: Not on file    Transportation needs     Medical: Not on file     Non-medical: Not on file   Tobacco Use    Smoking status: Current Every Day Smoker     Packs/day: 0.50     Years: 13.00     Pack years: 6.50     Types: Cigarettes    Smokeless tobacco: Never Used   Substance and Sexual Activity    Alcohol use: Not Currently     Alcohol/week: 0.0 standard drinks     Comment: occ    Drug use: Never    Sexual activity: Yes     Partners: Male     Birth control/protection: None   Lifestyle    Physical activity     Days per week: Not on file     Minutes per session: Not on file    Stress: Not on file   Relationships    Social connections     Talks on phone: Not on file     Gets together: Not on file     Attends Oriental orthodox service: Not on file     Active member of club or organization: Not on file     Attends meetings of clubs or organizations: Not on file     Relationship status: Not on file    Intimate partner violence     Fear of current or ex partner: Not on file     Emotionally abused: Not on file     Physically abused: Not on file     Forced sexual activity: Not on file   Other Topics Concern     Service Not Asked    Blood Transfusions Not Asked    Caffeine Concern Not Asked    Occupational Exposure Not Asked    Hobby Hazards Not Asked    Sleep Concern Not Asked    Stress Concern Not Asked    Weight Concern Not Asked    Special Diet Not Asked    Back Care Not Asked    Exercise Not Asked    Bike Helmet Not Asked   Ceres Not Asked    Self-Exams Not Asked   Social History Narrative     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 3 dogs--husky, lab, and pit bull-pointer mix. Past Surgical History:   Procedure Laterality Date    HX CHOLECYSTECTOMY  2016    HX WISDOM TEETH EXTRACTION      16-14 years old       Past Medical History:   Diagnosis Date    Abnormal weight gain 10/15/2015    Anxiety     Chronic hypertension complicating or reason for care during pregnancy, second trimester 4/15/2020    Depression     Frequent headaches 10/15/2015    Striae purple 10/15/2015         ROS:  A 12 point review of symptoms negative except for chief complaint as described above. PHYSICAL EXAM:  Blood pressure 139/88, pulse 87, temperature 98.2 °F (36.8 °C), resp. rate 18, last menstrual period 11/24/2019, currently breastfeeding. Constitutional: The patient appears well, alert, oriented x 3. Cardiovascular: Heart RRR, no murmurs.    Respiratory: Lungs clear, no respiratory distress  GI: Abdomen soft, nontender, no guarding    Skin: no rashes or lesions  Psychiatric:Mood/ Affect: appropriate  Neuro exam- grossly normal  Recent Results (from the past 12 hour(s))   CBC WITH AUTOMATED DIFF    Collection Time: 09/03/20  3:26 PM   Result Value Ref Range    WBC 12.7 (H) 4.3 - 11.1 K/uL    RBC 3.12 (L) 4.05 - 5.2 M/uL    HGB 9.1 (L) 11.7 - 15.4 g/dL    HCT 28.2 (L) 35.8 - 46.3 %    MCV 90.4 79.6 - 97.8 FL    MCH 29.2 26.1 - 32.9 PG    MCHC 32.3 31.4 - 35.0 g/dL    RDW 13.2 11.9 - 14.6 %    PLATELET 771 617 - 117 K/uL    MPV 11.0 9.4 - 12.3 FL    ABSOLUTE NRBC 0.00 0.0 - 0.2 K/uL    DF AUTOMATED      NEUTROPHILS 82 (H) 43 - 78 %    LYMPHOCYTES 9 (L) 13 - 44 %    MONOCYTES 8 4.0 - 12.0 %    EOSINOPHILS 1 0.5 - 7.8 %    BASOPHILS 0 0.0 - 2.0 %    IMMATURE GRANULOCYTES 1 0.0 - 5.0 %    ABS. NEUTROPHILS 10.4 (H) 1.7 - 8.2 K/UL    ABS. LYMPHOCYTES 1.2 0.5 - 4.6 K/UL    ABS. MONOCYTES 1.0 0.1 - 1.3 K/UL    ABS. EOSINOPHILS 0.1 0.0 - 0.8 K/UL    ABS. BASOPHILS 0.0 0.0 - 0.2 K/UL    ABS. IMM. GRANS. 0.1 0.0 - 0.5 K/UL   METABOLIC PANEL, COMPREHENSIVE    Collection Time: 20  3:26 PM   Result Value Ref Range    Sodium 140 136 - 145 mmol/L    Potassium 4.1 3.5 - 5.1 mmol/L    Chloride 107 98 - 107 mmol/L    CO2 26 21 - 32 mmol/L    Anion gap 7 7 - 16 mmol/L    Glucose 86 65 - 100 mg/dL    BUN 9 6 - 23 MG/DL    Creatinine 0.72 0.6 - 1.0 MG/DL    GFR est AA >60 >60 ml/min/1.73m2    GFR est non-AA >60 >60 ml/min/1.73m2    Calcium 8.6 8.3 - 10.4 MG/DL    Bilirubin, total 0.3 0.2 - 1.1 MG/DL    ALT (SGPT) 25 12 - 65 U/L    AST (SGOT) 54 (H) 15 - 37 U/L    Alk. phosphatase 138 (H) 50 - 136 U/L    Protein, total 6.8 6.3 - 8.2 g/dL    Albumin 2.0 (L) 3.5 - 5.0 g/dL    Globulin 4.8 (H) 2.3 - 3.5 g/dL    A-G Ratio 0.4 (L) 1.2 - 3.5     URIC ACID    Collection Time: 20  3:26 PM   Result Value Ref Range    Uric acid 5.4 2.6 - 6.0 MG/DL   LD    Collection Time: 20  3:26 PM   Result Value Ref Range     (H) 100 - 190 U/L       I personally reviewed pt's medical record including relevant labs and ultrasounds  I reviewed the NST at today's encounter    Assessment/Plan:  35 yo  who is 5 days post op from csection with suspected spinal headache based on symptoms and on MRI. Pt is much improved after iv toradol, dilaudid, phenergan and iv fluids as well as po caffeine.   See by anesthesia. Pt to have blood patch done by interventional radiology tomorrow. appt scheduled. Encouraged pt to lie flat tonight; to fill rx fioricett, to take po ibuprofen and drink caffeinated beverages.

## 2020-09-03 NOTE — DISCHARGE INSTRUCTIONS
Patient Education        Learning About Spinal Headaches  What is a spinal headache? Headaches may happen after certain procedures that involve the spine. These procedures include myelograms, spinal taps, and epidurals for anesthesia. In these procedures, a bit of spinal fluid may leak out of the space around the spinal cord. The leak usually isn't dangerous. But if enough fluid leaks out, it changes the pressure around your spinal cord. That can cause a headache. Many people who have a spinal procedure don't get a headache afterwards. For the people who do, the headache can be relieved by self-care at home. It usually goes away in a few days. What are the symptoms? A spinal headache usually starts in the first few days after the procedure that caused it. You may feel a dull, throbbing pain. It can start in the front or back of the head, and you may feel it down into your neck and shoulders. The headache may get worse when you move your head or when you sit or stand. It should ease when you lie down. You may feel dizzy or sick to your stomach. You may have pain in your lower back. And you may hear a ringing in your ears. Even if your symptoms are mild, tell your doctor if they last for more than 2 or 3 days. If you have a more severe headache, make sure to call your doctor. How are they treated? A mild spinal headache can be relieved by self-care at home. It usually goes away in a few days. A good first step is to lie down in a quiet, dark room until the headache is gone. Your doctor may also suggest caffeine to relieve your headache. He or she may give you a prescription for caffeine tablets. Or your doctor may have you try drinks with caffeine, like coffee or espresso. If your doctor agrees, you can take over-the-counter pain medicine. Be sure to follow the directions on the label. Don't forget to drink liquids to keep your body hydrated. Avoid drinks with alcohol.  They won't help your body stay hydrated. And they may make your headache worse. If your home treatment doesn't relieve the headache, talk to your doctor about getting treated with a blood patch. This procedure uses your own blood to help your headache. To apply a blood patch, your doctor takes blood from your arm and injects it into the area of your lower back where the leak happened. The blood restores the pressure around your spinal cord. It also helps seal any leak that may still be there. Many people feel better right away, but it could take a day or two. And a few people need to have a second blood patch. Follow-up care is a key part of your treatment and safety. Be sure to make and go to all appointments, and call your doctor if you are having problems. It's also a good idea to know your test results and keep a list of the medicines you take. Where can you learn more? Go to http://www.gray.com/  Enter C714 in the search box to learn more about \"Learning About Spinal Headaches. \"  Current as of: August 22, 2019               Content Version: 12.6  © 8139-6825 [employer]. Care instructions adapted under license by [employer] (which disclaims liability or warranty for this information). If you have questions about a medical condition or this instruction, always ask your healthcare professional. [address] any warranty or liability for your use of this information.

## 2020-09-03 NOTE — TELEPHONE ENCOUNTER
Patient was seen in the [facility] yesterday for headache. Per RN note, patient was very emotional and was administered the EPDS prior to discharge. Score = 15. Phone call to patient today to check-in ([phone]). Patient was in the restroom, so  spoke with her  Willy Powell). He states that she's \"miserable unless she's lying flat on her back in the bed. \"  They are gathering their things to come up to the [facility] in hopes of her getting a blood patch. SW will follow-up with family.     SHAHRIAR Sales-DIOGO  [facility]   [phone]

## 2020-09-03 NOTE — PROGRESS NOTES
46 Pt presents with c/o severe headache with \"ringing in her ears and feels like she can hear like she is under water\" and and neck pain 10/10 since last Fri and vomiting since this am. Pt states she was here yesterday for same problem. Pt last ate at 7pm last night. States incisional pain 2/10. She was unable to  prescription due to pharmacy being closed when she left here yesterday.   0  arrives with baby  [phone] Peripheral IV started in right antecubital without complications

## 2020-09-03 NOTE — PROGRESS NOTES
1550 Dilaudid and phenergan given IV as ordered.   1605 pain better; c/o pain neck 7/10, headache 3-4/10

## 2020-09-03 NOTE — PROGRESS NOTES
I was asked to evaluate Mrs. Mtz for a possible PDPH. Of note, patient was an induction on  and subsequently a  on . Patient was an extremely difficult epidural placement due to her body habitus as well as significant anxiety and had to have her initial epidural replaced - there was no wet tap with either of her epidurals but she had patchy coverage so decision was made to place a spinal for her . She had a 22 G Quincke spinal. She reports that since Friday she has had a headache, neck pain, and recently some N/V. She did not initially report any improvement with positioning but she reports that she has been able to sleep well while lying flat. She had an MRI yesterday that suggested the sequelae of a CSF leak. We discussed at length the normal time course of a PDPH and the conservative therapies that we recommend (resting while lying flat, hydration, caffeine, tylenol, advil). Patient was a very difficult epidural placement due to her body habitus and significant anxiety so I do not believe that pursuing an epidural blood patch is the best option for her currently. We discussed the possibility of having an epidural blood patch with interventional radiology if patient desires. Patient and  endorsed understanding and all questions/concerns addressed. I also discussed the plan with the OB team.          Addendum 4:30 PM - patient expressed interest in possibly having an epidural blood patch with IR given the severity of her symptoms. I spoke with IR scheduling over the phone who plan to get her scheduled for tomorrow. Patient, , and OB team updated on plan.       Rizwana Guerrero MD

## 2020-09-03 NOTE — PROGRESS NOTES
Discharged home undelivered with  and baby. To follow up with IR tomorrow to have blood patch done. IV removed. Prescription given for medication. Discharge instructions reviewed with understanding.

## 2020-09-03 NOTE — PROGRESS NOTES
1600 Toradol given IV as ordered. 1705 po Fioricet given as ordered. Pt states she is feeling better.

## 2020-09-04 ENCOUNTER — HOSPITAL ENCOUNTER (OUTPATIENT)
Dept: INTERVENTIONAL RADIOLOGY/VASCULAR | Age: 30
Discharge: HOME | End: 2020-09-04
Attending: ANESTHESIOLOGY
Payer: COMMERCIAL

## 2020-09-04 VITALS
TEMPERATURE: 98.1 F | OXYGEN SATURATION: 93 % | WEIGHT: 293 LBS | HEART RATE: 70 BPM | BODY MASS INDEX: 45.99 KG/M2 | HEIGHT: 67 IN | RESPIRATION RATE: 16 BRPM | DIASTOLIC BLOOD PRESSURE: 72 MMHG | SYSTOLIC BLOOD PRESSURE: 150 MMHG

## 2020-09-04 DIAGNOSIS — R51.9 HEADACHE: ICD-10-CM

## 2020-09-04 DIAGNOSIS — R11.2 NAUSEA AND VOMITING: ICD-10-CM

## 2020-09-04 PROCEDURE — 62273 INJECT EPIDURAL PATCH: CPT

## 2020-09-04 PROCEDURE — 74011000636 HC RX REV CODE- 636: Performed by: PHYSICIAN ASSISTANT

## 2020-09-04 PROCEDURE — 74011250637 HC RX REV CODE- 250/637: Performed by: RADIOLOGY

## 2020-09-04 PROCEDURE — 77030003666 HC NDL SPINAL BD -A

## 2020-09-04 PROCEDURE — 74011250636 HC RX REV CODE- 250/636: Performed by: RADIOLOGY

## 2020-09-04 PROCEDURE — 77030014143 HC TY PUNC LUMBR BD -A

## 2020-09-04 RX ORDER — SODIUM CHLORIDE 9 MG/ML
1000 INJECTION, SOLUTION INTRAVENOUS CONTINUOUS
Status: ACTIVE | OUTPATIENT
Start: 2020-09-04 | End: 2020-09-04

## 2020-09-04 RX ORDER — HYDROCODONE BITARTRATE AND ACETAMINOPHEN 5; 325 MG/1; MG/1
1 TABLET ORAL
Status: DISCONTINUED | OUTPATIENT
Start: 2020-09-04 | End: 2020-09-08 | Stop reason: HOSPADM

## 2020-09-04 RX ADMIN — SODIUM CHLORIDE 1000 ML/HR: 900 INJECTION, SOLUTION INTRAVENOUS at 13:10

## 2020-09-04 RX ADMIN — HYDROCODONE BITARTRATE AND ACETAMINOPHEN 1 TABLET: 5; 325 TABLET ORAL at 13:55

## 2020-09-04 NOTE — PROGRESS NOTES
TRANSFER - OUT REPORT:    Verbal report given to BECKY Owens on Anitha Garner  being transferred to IR room #2 for routine progression of care       Report consisted of patients Situation, Background, Assessment and   Recommendations(SBAR). Information from the following report(s) SBAR, Kardex, Procedure Summary and MAR was reviewed with the receiving nurse. Lines:   Saline Lock 09/04/20 Anterior;Right Forearm (Active)        Opportunity for questions and clarification was provided.       Patient transported with:   Registered Nurse

## 2020-09-04 NOTE — DISCHARGE INSTRUCTIONS
[address]  Department of Interventional Radiology  [phone] Office  [phone] Fax  POST LUMBAR PUNCTURE/MYELOGRAM/INTRATHECAL CHEMOTHERAPY DISCHARGE INSTRUCTIONS  General Information:  Lumbar Puncture: A LP is done to help diagnose several disorders, like pseudo tumor, migraines, meningitis, and multiple sclerosis. It involves a puncture (usually in the lower spine) into the sac that protects the spinal column. A sample of the fluid in that space is removed and tested in the lab. Myelogram:     A Myelogram involves a lumbar puncture, and instead of removing fluid, contrast will be injected into the sac surrounding the spinal column. It is done to visualize the spinal column, nerve roots, spinal canal, vertebral discs and disc space. It is usually done to diagnose back pain with unknown cause or in preparation for surgery. After the injection, a CT scan will be done, usually within two hours of the injection. Intrathecal Chemotherapy:      Chemotherapy can be given in many forms. Intrathecal chemo involves a lumbar puncture, and instead of removing fluid, the chemo will be injected into the space. After any of procedures, you will be asked to lie flat on your back for 4-6 hours to prevent complications. You should also rest for 24 hours after you go home, and force fluids. If you have a headache, you should take Tylenol or acetaminophen. Call If:     You should call your Physician and/or the Radiology Nurse if you develop a headache that is not relieved by Tylenol, and worsens when you stand and eases when you lie down, you need to call. You may have developed what is referred to as a spinal headache. Our physician's will probably advise you to be on strict bed rest for 24 hours, to drink lots of fluids and caffeine. If this does not help the head pain, call again the next day.  You should call if you have bleeding other than a small spot on your bandage. You should call if you have any numbness, tingling, weakness, fever, chills, urinary retention, severe itching, rash, welts, swelling, or confusion. Follow-Up Instructions: See the doctor who ordered your procedure as he/she has instructed. If you had a Lumbar Puncture or Myelogram, your results should be available to your ordering doctor in 3-5 business days. You can remove your dressing in 24 hours and shower regularly. Do not bathe or swim for 72 hours. To Reach Us: If you have any questions about your procedure, please call the Interventional Radiology department at [phone]. After business hours (5pm) and weekends, call the answering service at [phone] and ask for the Radiologist on call to be paged. Si tiene Preguntas acerca del procedimiento, por favor llame al departamento de Radiología Intervencional al [phone]. Después de horas de oficina (5 pm) y los fines de [place], llamar al Yao Carlie Nazario al [phone] y pregunte por el Radiologo de [facility]. Interventional Radiology General Nurse Discharge    After general anesthesia or intravenous sedation, for 24 hours or while taking prescription Narcotics:  · Limit your activities  · Do not drive and operate hazardous machinery  · Do not make important personal or business decisions  · Do  not drink alcoholic beverages  · If you have not urinated within 8 hours after discharge, please contact your surgeon on call. * Please give a list of your current medications to your Primary Care Provider. * Please update this list whenever your medications are discontinued, doses are     changed, or new medications (including over-the-counter products) are added. * Please carry medication information at all times in case of emergency situations.     These are general instructions for a healthy lifestyle:    No smoking/ No tobacco products/ Avoid exposure to second hand smoke  Surgeon General's Warning:  Quitting smoking now greatly reduces serious risk to your health. Obesity, smoking, and sedentary lifestyle greatly increases your risk for illness  A healthy diet, regular physical exercise & weight monitoring are important for maintaining a healthy lifestyle    You may be retaining fluid if you have a history of heart failure or if you experience any of the following symptoms:  Weight gain of 3 pounds or more overnight or 5 pounds in a week, increased swelling in our hands or feet or shortness of breath while lying flat in bed. Please call your doctor as soon as you notice any of these symptoms; do not wait until your next office visit. Recognize signs and symptoms of STROKE:  F-face looks uneven    A-arms unable to move or move unevenly    S-speech slurred or non-existent    T-time-call 911 as soon as signs and symptoms begin-DO NOT go       Back to bed or wait to see if you get better-TIME IS BRAIN.      Patient Signature:  Date: 9/4/2020  Discharging Nurse: Marley Atkins

## 2020-09-04 NOTE — PROCEDURES
Department of Interventional Radiology  [phone]        Interventional Radiology Brief Procedure Note    Patient: Jocelyn Ramos MRN: [id number]  SSN: [id number]    YOB: 1990  Age: 34 y.o. Sex: female      Date of Procedure: 9/4/2020    Pre-Procedure Diagnosis: Spinal headache. 7 day hx of HA after unsuccessful Epidural Anesthetic/successful Spinal Anesthetic    Post-Procedure Diagnosis: SAME    Procedure(s): L45 Epidural Blood Patch. Brief Description of Procedure: as above    Performed By: William Baltazar MD     Assistants: None    Anesthesia:Lidocaine    Estimated Blood Loss: Less than 10ml    Specimens:  None    Implants:  None    Findings: Unremarkable    Complications: None    Recommendations: 1 hour bedrest.  1 L NS bolus.        Follow Up: PRN    Signed By: William Baltazar MD     September 4, 2020

## 2020-09-08 PROCEDURE — 74011000636 HC RX REV CODE- 636: Performed by: PHYSICIAN ASSISTANT

## 2020-09-08 RX ADMIN — IOPAMIDOL 0.5 ML: 408 INJECTION, SOLUTION INTRATHECAL at 10:00

## 2020-09-09 ENCOUNTER — TELEPHONE (OUTPATIENT)
Dept: CASE MANAGEMENT | Age: 30
End: 2020-09-09

## 2020-09-09 NOTE — TELEPHONE ENCOUNTER
Phone call to patient at [phone] due to EPDS score of 15 after delivery. No answer; message left.     MICAELA Garcia  [facility]   [phone]

## 2020-09-11 ENCOUNTER — HOME HEALTH ADMISSION (OUTPATIENT)
Dept: HOME HEALTH SERVICES | Facility: HOME HEALTH | Age: 30
End: 2020-09-11

## 2020-09-16 ENCOUNTER — HOSPITAL ENCOUNTER (EMERGENCY)
Age: 30
Discharge: HOME | End: 2020-09-16
Attending: OBSTETRICS & GYNECOLOGY | Admitting: OBSTETRICS & GYNECOLOGY
Payer: COMMERCIAL

## 2020-09-16 VITALS
WEIGHT: 293 LBS | SYSTOLIC BLOOD PRESSURE: 127 MMHG | TEMPERATURE: 98.1 F | RESPIRATION RATE: 18 BRPM | BODY MASS INDEX: 45.99 KG/M2 | HEART RATE: 82 BPM | DIASTOLIC BLOOD PRESSURE: 90 MMHG | HEIGHT: 67 IN

## 2020-09-16 LAB
ALBUMIN SERPL-MCNC: 3.2 G/DL (ref 3.5–5)
ALBUMIN/GLOB SERPL: 0.7 {RATIO} (ref 1.2–3.5)
ALP SERPL-CCNC: 147 U/L (ref 50–136)
ALT SERPL-CCNC: 30 U/L (ref 12–65)
ANION GAP SERPL CALC-SCNC: 5 MMOL/L (ref 7–16)
AST SERPL-CCNC: 37 U/L (ref 15–37)
BASOPHILS # BLD: 0.1 K/UL (ref 0–0.2)
BASOPHILS NFR BLD: 1 % (ref 0–2)
BILIRUB SERPL-MCNC: 0.2 MG/DL (ref 0.2–1.1)
BUN SERPL-MCNC: 13 MG/DL (ref 6–23)
CALCIUM SERPL-MCNC: 8.8 MG/DL (ref 8.3–10.4)
CHLORIDE SERPL-SCNC: 107 MMOL/L (ref 98–107)
CO2 SERPL-SCNC: 27 MMOL/L (ref 21–32)
CREAT SERPL-MCNC: 1.05 MG/DL (ref 0.6–1)
DIFFERENTIAL METHOD BLD: ABNORMAL
EOSINOPHIL # BLD: 0.2 K/UL (ref 0–0.8)
EOSINOPHIL NFR BLD: 3 % (ref 0.5–7.8)
ERYTHROCYTE [DISTWIDTH] IN BLOOD BY AUTOMATED COUNT: 13 % (ref 11.9–14.6)
GLOBULIN SER CALC-MCNC: 4.8 G/DL (ref 2.3–3.5)
GLUCOSE SERPL-MCNC: 79 MG/DL (ref 65–100)
GLUCOSE, GLUUPC: NEGATIVE
HCT VFR BLD AUTO: 35.7 % (ref 35.8–46.3)
HGB BLD-MCNC: 11.6 G/DL (ref 11.7–15.4)
IMM GRANULOCYTES # BLD AUTO: 0 K/UL (ref 0–0.5)
IMM GRANULOCYTES NFR BLD AUTO: 1 % (ref 0–5)
KETONES UR-MCNC: NEGATIVE MG/DL
LYMPHOCYTES # BLD: 3.3 K/UL (ref 0.5–4.6)
LYMPHOCYTES NFR BLD: 39 % (ref 13–44)
MCH RBC QN AUTO: 28.9 PG (ref 26.1–32.9)
MCHC RBC AUTO-ENTMCNC: 32.5 G/DL (ref 31.4–35)
MCV RBC AUTO: 88.8 FL (ref 79.6–97.8)
MONOCYTES # BLD: 0.8 K/UL (ref 0.1–1.3)
MONOCYTES NFR BLD: 9 % (ref 4–12)
NEUTS SEG # BLD: 4.1 K/UL (ref 1.7–8.2)
NEUTS SEG NFR BLD: 48 % (ref 43–78)
NRBC # BLD: 0 K/UL (ref 0–0.2)
PLATELET # BLD AUTO: 498 K/UL (ref 150–450)
PMV BLD AUTO: 10.5 FL (ref 9.4–12.3)
POTASSIUM SERPL-SCNC: 3.7 MMOL/L (ref 3.5–5.1)
PROT SERPL-MCNC: 8 G/DL (ref 6.3–8.2)
PROT UR QL: NORMAL
RBC # BLD AUTO: 4.02 M/UL (ref 4.05–5.2)
SODIUM SERPL-SCNC: 139 MMOL/L (ref 136–145)
WBC # BLD AUTO: 8.5 K/UL (ref 4.3–11.1)

## 2020-09-16 PROCEDURE — 99281 EMR DPT VST MAYX REQ PHY/QHP: CPT | Performed by: OBSTETRICS & GYNECOLOGY

## 2020-09-16 PROCEDURE — 81002 URINALYSIS NONAUTO W/O SCOPE: CPT | Performed by: OBSTETRICS & GYNECOLOGY

## 2020-09-16 PROCEDURE — 80053 COMPREHEN METABOLIC PANEL: CPT

## 2020-09-16 PROCEDURE — 85025 COMPLETE CBC W/AUTO DIFF WBC: CPT

## 2020-09-16 RX ORDER — BUSPIRONE HYDROCHLORIDE 15 MG/1
15 TABLET ORAL 2 TIMES DAILY
COMMUNITY

## 2020-09-16 RX ORDER — ALPRAZOLAM 2 MG/1
2 TABLET ORAL
COMMUNITY

## 2020-09-16 RX ORDER — SERTRALINE HYDROCHLORIDE 50 MG/1
50 TABLET, FILM COATED ORAL
COMMUNITY
End: 2021-08-11 | Stop reason: DRUGHIGH

## 2020-09-16 NOTE — PROGRESS NOTES
Pt given discharge instructions. Pt and  verbalized understanding. Pt discharged home in stable condition.

## 2020-09-16 NOTE — ED PROVIDER NOTES
Chief Complaint: generally not feeling well with wound disruption      34 y.o. female who is 2 weeks post op from csection with wound disruption. Pt is followed by Dr Jyoti Nuno at [facility]. Wound cultures are pending. Pt is doing daily packing. (her  is doing ) . Pt reports that she is losing weight, she is weak and she is having nausea and some vomiting. She is taking ibuprofen for pain, narcotics before dressing change, keflex, zofran for nausea and prenatal vitamins. Pt is breast feeding. Of note, pt had a spinal headache previously- completely resolved after blood patch- no c/o about that today. HISTORY:    Social History     Substance and Sexual Activity   Sexual Activity Not Currently    Partners: Male    Birth control/protection: None     Patient's last menstrual period was 11/24/2019 (exact date).     Social History     Socioeconomic History    Marital status:      Spouse name: Not on file    Number of children: Not on file    Years of education: Not on file    Highest education level: Not on file   Occupational History    Not on file   Social Needs    Financial resource strain: Not on file    Food insecurity     Worry: Not on file     Inability: Not on file    Transportation needs     Medical: Not on file     Non-medical: Not on file   Tobacco Use    Smoking status: Current Every Day Smoker     Packs/day: 0.50     Years: 13.00     Pack years: 6.50     Types: Cigarettes    Smokeless tobacco: Never Used   Substance and Sexual Activity    Alcohol use: Not Currently     Alcohol/week: 0.0 standard drinks     Comment: occ    Drug use: Never    Sexual activity: Not Currently     Partners: Male     Birth control/protection: None   Lifestyle    Physical activity     Days per week: Not on file     Minutes per session: Not on file    Stress: Not on file   Relationships    Social connections     Talks on phone: Not on file     Gets together: Not on file     Attends [employer] service: Not on file     Active member of club or organization: Not on file     Attends meetings of clubs or organizations: Not on file     Relationship status: Not on file    Intimate partner violence     Fear of current or ex partner: Not on file     Emotionally abused: Not on file     Physically abused: Not on file     Forced sexual activity: Not on file   Other Topics Concern     Service Not Asked    Blood Transfusions Not Asked    Caffeine Concern Not Asked    Occupational Exposure Not Asked   Sherlejose [place] Hazards Not Asked    Sleep Concern Not Asked    Stress Concern Not Asked    Weight Concern Not Asked    Special Diet Not Asked    Back Care Not Asked    Exercise Not Asked    Bike Helmet Not Asked    [address] Not Asked    Self-Exams Not Asked   Social History Narrative     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 3 dogs--husky, lab, and pit bull-pointer mix. Past Surgical History:   Procedure Laterality Date    HX  SECTION  2020    HX CHOLECYSTECTOMY  2016    HX WISDOM TEETH EXTRACTION      16-14 years old    IR INJ BLOOD PATCH  2020       Past Medical History:   Diagnosis Date    Abnormal weight gain 10/15/2015    Anxiety     Asthma     Chronic hypertension complicating or reason for care during pregnancy, second trimester 4/15/2020    Depression     Frequent headaches 10/15/2015    Gestational hypertension     Nicotine vapor product user     Postpartum depression     Striae purple 10/15/2015         ROS:  A 12 point review of symptoms negative except for chief complaint as described above. PHYSICAL EXAM:  Blood pressure (!) 127/90, pulse 82, temperature 98.1 °F (36.7 °C), resp. rate 18, height 5' 7\" (1.702 m), weight 136.5 kg (301 lb), last menstrual period 2019, currently breastfeeding. Constitutional: The patient appears well, alert, oriented x 3. Cardiovascular: Heart RRR, no murmurs. Respiratory: Lungs clear, no respiratory distress  GI: Abdomen soft, nontender, no guarding, BS+  Abdomen - incision open about 3 cm- probed with qtip with moderate serous drainage, no odor, incsion edges clean without pus, redness or evidence of infection- the other portion of incision appears to be healing well without evidence infection. Upper ext: no edema, reflexes +2  Lower ext: no edema, neg lorna's, reflexes +2  Skin: no rashes or lesions  Psychiatric:Mood/ Affect: appropriate  Recent Results (from the past 12 hour(s))   POC URINE DIPSTICK MANUAL    Collection Time: 09/16/20  5:31 PM   Result Value Ref Range    Protein (POC) 30 mg/dL Negative    Glucose, urine (POC) Negative Negative    Ketones (POC) Negative Negative   CBC WITH AUTOMATED DIFF    Collection Time: 09/16/20  5:43 PM   Result Value Ref Range    WBC 8.5 4.3 - 11.1 K/uL    RBC 4.02 (L) 4.05 - 5.2 M/uL    HGB 11.6 (L) 11.7 - 15.4 g/dL    HCT 35.7 (L) 35.8 - 46.3 %    MCV 88.8 79.6 - 97.8 FL    MCH 28.9 26.1 - 32.9 PG    MCHC 32.5 31.4 - 35.0 g/dL    RDW 13.0 11.9 - 14.6 %    PLATELET 161 (H) 051 - 450 K/uL    MPV 10.5 9.4 - 12.3 FL    ABSOLUTE NRBC 0.00 0.0 - 0.2 K/uL    DF AUTOMATED      NEUTROPHILS 48 43 - 78 %    LYMPHOCYTES 39 13 - 44 %    MONOCYTES 9 4.0 - 12.0 %    EOSINOPHILS 3 0.5 - 7.8 %    BASOPHILS 1 0.0 - 2.0 %    IMMATURE GRANULOCYTES 1 0.0 - 5.0 %    ABS. NEUTROPHILS 4.1 1.7 - 8.2 K/UL    ABS. LYMPHOCYTES 3.3 0.5 - 4.6 K/UL    ABS. MONOCYTES 0.8 0.1 - 1.3 K/UL    ABS. EOSINOPHILS 0.2 0.0 - 0.8 K/UL    ABS. BASOPHILS 0.1 0.0 - 0.2 K/UL    ABS. IMM.  GRANS. 0.0 0.0 - 0.5 K/UL   METABOLIC PANEL, COMPREHENSIVE    Collection Time: 09/16/20  5:43 PM   Result Value Ref Range    Sodium 139 136 - 145 mmol/L    Potassium 3.7 3.5 - 5.1 mmol/L    Chloride 107 98 - 107 mmol/L    CO2 27 21 - 32 mmol/L    Anion gap 5 (L) 7 - 16 mmol/L    Glucose 79 65 - 100 mg/dL    BUN 13 6 - 23 MG/DL    Creatinine 1.05 (H) 0.6 - 1.0 MG/DL    GFR est AA >60 >60 ml/min/1.73m2    GFR est non-AA >60 >60 ml/min/1.73m2    Calcium 8.8 8.3 - 10.4 MG/DL    Bilirubin, total 0.2 0.2 - 1.1 MG/DL    ALT (SGPT) 30 12 - 65 U/L    AST (SGOT) 37 15 - 37 U/L    Alk.  phosphatase 147 (H) 50 - 136 U/L    Protein, total 8.0 6.3 - 8.2 g/dL    Albumin 3.2 (L) 3.5 - 5.0 g/dL    Globulin 4.8 (H) 2.3 - 3.5 g/dL    A-G Ratio 0.7 (L) 1.2 - 3.5           I personally reviewed pt's medical record including relevant labs       Assessment/Plan:  35 yo  s/p csection with wound separation with serous drainage  Pt c/o nausea  Labs as above  Suspect meds may be contributing to nausea- discussed limiting narcotics, bland diet, stop keflex,  Pt to follow up in 2 days with her regular OB  Wound cultures pending  I did consider getting a CT scan today, but that did not seem indicated based on benign exam and labs  Will consider in future if symptoms persist  Continue daily dressing changes

## 2020-09-16 NOTE — DISCHARGE INSTRUCTIONS
Patient Education        Opened Cut After Surgery: Care Instructions  Your Care Instructions  Sometimes a cut made during surgery opens when it isn't supposed to. This may be because of an infection or another problem that keeps the cut's edges from staying together. The doctor has checked your open cut. He or she may have put a dressing in the cut but left it open to heal. This lets the cut heal from the bottom up. Your doctor may have given you a vacuum device to take home that helps close the cut. A cut may be left open when it is infected or likely to become infected. This is because closing the cut may make an existing infection worse and a new infection more likely. You will have a bandage. Follow-up care is a key part of your treatment and safety. Be sure to make and go to all appointments, and call your doctor if you are having problems. It's also a good idea to know your test results and keep a list of the medicines you take. How can you care for yourself at home? · You may shower with soap and water. Your doctor will tell you when it is safe to use a bathtub or go swimming. · If your doctor told you how to care for your cut, follow your doctor's instructions. If you did not get instructions, follow this general advice:  ? Wash around the cut with clean water 2 times a day. Don't use hydrogen peroxide or alcohol, which can slow healing. · Avoid any activity that could cause your cut to get worse. For example, if your cut is in the belly, avoid lifting anything that would make you strain. This may include heavy grocery bags and milk containers, a heavy briefcase or backpack, cat litter or dog food bags, a vacuum , or a child. · Take pain medicines exactly as directed. ? If the doctor gave you a prescription medicine for pain, take it as prescribed. ? If you are not taking a prescription pain medicine, ask your doctor if you can take an over-the-counter medicine.   · If your doctor prescribed antibiotics, take them as directed. Do not stop taking them just because you feel better. You need to take the full course of antibiotics. · If your cut is packed (gauze is put into the cut), follow your doctor's instructions on how often and how to repack the cut. A home health worker may do this for you. When should you call for help? Call your doctor now or seek immediate medical care if:    · The cut gets bigger.     · You can see organs under the skin.     · You have new pain, or your pain gets worse.     · The cut starts to bleed, and blood soaks through the bandage. Oozing small amounts of blood is normal.     · The skin near the cut is cold or pale or changes color.     · You have tingling, weakness, or numbness near the cut.     · You have trouble moving the area near the cut.     · You have symptoms of infection, such as:  ? Increased pain, swelling, warmth, or redness around the cut.  ? Red streaks leading from the cut.  ? Pus draining from the cut.  ? A fever. Watch closely for changes in your health, and be sure to contact your doctor if:    · The cut is not closing (getting smaller).     · You do not get better as expected. Where can you learn more? Go to http://katt-galen.info/  Enter E560 in the search box to learn more about \"Opened Cut After Surgery: Care Instructions. \"  Current as of: June 26, 2019               Content Version: 12.6  © 7329-0331 [employer]. Care instructions adapted under license by [employer] (which disclaims liability or warranty for this information). If you have questions about a medical condition or this instruction, always ask your healthcare professional. [place] [address] any warranty or liability for your use of this information.

## 2020-09-17 ENCOUNTER — TELEPHONE (OUTPATIENT)
Dept: CASE MANAGEMENT | Age: 30
End: 2020-09-17

## 2020-09-17 NOTE — TELEPHONE ENCOUNTER
Phone call to patient at [phone] to check-in due to EPDS score of 15 after delivery. SW spoke with patient who states that baby Rosmery Mendoza is doing very well. Unfortunately, she has experienced some complications post  and is having to pack her wound regularly with the assistance of home health/her . She has recently visited her psychiatrist, and they are adjusting her medication to assist with her overall mood right now. Additionally, she's seeing her OB 2x/week for medical follow-up. Patient states that she continues to have support from her  so that she may continue to heal.    Patient denied any needs from  at this time, and she is agreeable to receive a call back within the next several weeks.     SHAHRIAR Ortega-DIOGO  [facility]   [phone]

## 2020-09-24 ENCOUNTER — HOME HEALTH ADMISSION (OUTPATIENT)
Dept: HOME HEALTH SERVICES | Facility: HOME HEALTH | Age: 30
End: 2020-09-24

## 2020-09-24 ENCOUNTER — HOSPITAL ENCOUNTER (OUTPATIENT)
Dept: WOUND CARE | Age: 30
Discharge: HOME | End: 2020-09-24
Attending: SURGERY
Payer: COMMERCIAL

## 2020-09-24 VITALS
HEIGHT: 67 IN | WEIGHT: 293 LBS | HEART RATE: 98 BPM | DIASTOLIC BLOOD PRESSURE: 80 MMHG | SYSTOLIC BLOOD PRESSURE: 129 MMHG | BODY MASS INDEX: 45.99 KG/M2 | RESPIRATION RATE: 18 BRPM | TEMPERATURE: 97.7 F | OXYGEN SATURATION: 98 %

## 2020-09-24 PROCEDURE — 99213 OFFICE O/P EST LOW 20 MIN: CPT

## 2020-09-24 NOTE — WOUND CARE
[address]  [address], [address]  Phone: [phone]  Fax: [phone]    Patient: Carol Toro MRN: [id number]  SSN: [id number]    YOB: 1990  Age: 34 y.o. Sex: female       Return Appointment: 1 week with Saintclair Bitters, MD    Instructions:   Abdominal Wound:  Cleanse wound with normal saline. Keep cover in shower. Change after shower. Apply Alginate with silver (rope); gently pack into wound, filling dead space. Change dressing 3 times a week or PRN by Home Health. *Stop smoking to aid in wound healing. *Do not lift more than baby. *Eat high protein diet. Should you experience increased redness, swelling, pain, foul odor, size of wound(s), or have a temperature over 101 degrees please contact the [address] [place] [address] Road at [phone] or if after hours contact your primary care physician or go to the hospital emergency department.     Signed By: Jeanine Arias RN     September 24, 2020

## 2020-09-24 NOTE — PROGRESS NOTES
Patricia Martell is a 66-year-old female who had a  on [phone]. She developed wound infection which is been opened and being packed she comes in today and this is partially closed we will continue packing it with alginate rope and will see her again weekly. Wound Center Progress Note    Patient: Ivette Miranda MRN: [id number]  SSN: [id number]    YOB: 1990  Age: 34 y.o. Sex: female      Subjective:     Chief Complaint:  Ivette Miranda is a 34 y.o. WHITE OR  female who presents with suprapubic  wound of 1 months duration.     History of Present Illness:     See above note  Wound Caused By: non-healed surgical wound for   Associated Signs and Symptoms: Mild pain and drainage  Timing: Intermittent  Quality: Aching  Severity: 2/10  Modifying Factors: Obesity and she continues to smoke I did talk about smoking cessation  Current Wound Care: See nurses notes    Past Medical History:   Diagnosis Date    Abnormal weight gain 10/15/2015    Anxiety     Asthma     Chronic hypertension complicating or reason for care during pregnancy, second trimester 4/15/2020    Depression     Frequent headaches 10/15/2015    Gestational hypertension     Nicotine vapor product user     Postpartum depression     Striae purple 10/15/2015      Past Surgical History:   Procedure Laterality Date    HX  SECTION  2020    HX CHOLECYSTECTOMY  2016    HX WISDOM TEETH EXTRACTION      16-14 years old    IR INJ BLOOD PATCH  2020     Family History   Problem Relation Age of Onset    Kidney Disease Mother         stones    Breast Cancer Mother     Diabetes Father     Arthritis-osteo Father     Thyroid Disease Maternal Grandmother     Hypertension Neg Hx       Social History     Tobacco Use    Smoking status: Current Every Day Smoker     Packs/day: 0.50     Years: 13.00     Pack years: 6.50     Types: Cigarettes    Smokeless tobacco: Never Used Substance Use Topics    Alcohol use: Not Currently     Alcohol/week: 0.0 standard drinks     Comment: occ       Prior to Admission medications    Medication Sig Start Date End Date Taking? Authorizing Provider   busPIRone (BUSPAR) 15 mg tablet Take 15 mg by mouth two (2) times a day. Provider, Historical   ALPRAZolam (Xanax) 2 mg tablet Take 2 mg by mouth daily as needed for Anxiety. Provider, Historical   sertraline (Zoloft) 50 mg tablet Take 50 mg by mouth nightly. Provider, Historical   ibuprofen (MOTRIN) 800 mg tablet Take 1 Tab by mouth every six (6) hours as needed for Pain. 9/8/20   Gauri North MD   fexofenadine (ALLEGRA) 60 mg tablet Take 1 Tab by mouth two (2) times a day. 8/18/20   Charan Hall MD   cetirizine (ZYRTEC) 10 mg tablet Take 1 Tab by mouth daily. 8/18/20   Korey Rodriguez MD   ondansetron (ZOFRAN ODT) 4 mg disintegrating tablet Take 1 Tab by mouth every eight (8) hours as needed for Nausea or Vomiting. 8/18/20   Charan Hall MD   calcium carbonate (TUMS) 200 mg calcium (500 mg) chew Take 1 Tab by mouth daily. Provider, Historical   prenatal vits62/FA/om3/dha/epa (PRENATAL GUMMY PO) Take  by mouth. Provider, Historical   budesonide-formoterol (SYMBICORT) 160-4.5 mcg/actuation HFAA Take 2 Puffs by inhalation two (2) times a day. 5/9/19   Art Orf, NP   albuterol (PROVENTIL HFA, VENTOLIN HFA, PROAIR HFA) 90 mcg/actuation inhaler Take 2 Puffs by inhalation every six (6) hours as needed for Wheezing. 4/17/19   Eduar Figueroa NP   sertraline (ZOLOFT) 100 mg tablet TAKE 1 TABLET BY MOUTH EVERY DAY 11/20/17   Art Orf, NP     Allergies   Allergen Reactions    Latex Rash    Augmentin [Amoxicillin-Pot Clavulanate] Rash    Metronidazole Hives and Itching        Review of Systems:  A comprehensive review of systems was negative except for that written in the History of Present Illness.      Lab Results   Component Value Date/Time    Hemoglobin A1c 5.2 01/15/2020 09:42 AM        Immunization History   Administered Date(s) Administered    DTaP 1990, 02/07/1991, 04/08/1991, 01/23/1992, 02/23/1995    Hep B Vaccine 08/12/2003, 09/11/2013    Hib 1990, 02/07/1991, 04/08/1991, 02/23/1995    MMR 01/23/1992, 02/23/1995    Poliovirus vaccine 1990, 02/07/1991, 01/25/1994, 02/23/1995       Body mass index is 47.46 kg/m². Counseling regarding nutrition done: Yes Pateint counsled about risks of smoking and cessation      Current medications:  Current Outpatient Medications   Medication Sig Dispense Refill    busPIRone (BUSPAR) 15 mg tablet Take 15 mg by mouth two (2) times a day.  ALPRAZolam (Xanax) 2 mg tablet Take 2 mg by mouth daily as needed for Anxiety.  sertraline (Zoloft) 50 mg tablet Take 50 mg by mouth nightly.  ibuprofen (MOTRIN) 800 mg tablet Take 1 Tab by mouth every six (6) hours as needed for Pain. 60 Tab 0    fexofenadine (ALLEGRA) 60 mg tablet Take 1 Tab by mouth two (2) times a day. 60 Tab 3    cetirizine (ZYRTEC) 10 mg tablet Take 1 Tab by mouth daily. 30 Tab 3    ondansetron (ZOFRAN ODT) 4 mg disintegrating tablet Take 1 Tab by mouth every eight (8) hours as needed for Nausea or Vomiting. 120 Tab 1    calcium carbonate (TUMS) 200 mg calcium (500 mg) chew Take 1 Tab by mouth daily.  prenatal vits62/FA/om3/dha/epa (PRENATAL GUMMY PO) Take  by mouth.  budesonide-formoterol (SYMBICORT) 160-4.5 mcg/actuation HFAA Take 2 Puffs by inhalation two (2) times a day. 1 Inhaler 5    albuterol (PROVENTIL HFA, VENTOLIN HFA, PROAIR HFA) 90 mcg/actuation inhaler Take 2 Puffs by inhalation every six (6) hours as needed for Wheezing.  1 Inhaler 2    sertraline (ZOLOFT) 100 mg tablet TAKE 1 TABLET BY MOUTH EVERY DAY 90 Tab 1         Objective:     Physical Exam:     Visit Vitals  /80 (BP 1 Location: Left arm, BP Patient Position: At rest;Supine)   Pulse 98   Temp 97.7 °F (36.5 °C)   Resp 18   Ht 5' 7\" (1.702 m)   Wt 137.4 kg (303 lb)   SpO2 98%   BMI 47.46 kg/m²       General: well developed, well nourished, pleasant , NAD. Hygiene good  Psych: cooperative. Pleasant. No anxiety or depression. Normal mood and affect. Neuro: alert and oriented to person/place/situation. Otherwise nonfocal.  Derm: Normal turgor for age, dry skin  HEENT: Normocephalic, atraumatic. EOMI. Conjunctiva clear. No scleral icterus. Neck: Normal range of motion. No masses. Chest: Good air entry bilaterally. Respirations nonlabored  Cardio: Normal heart sounds,no rubs, murmurs or gallops  Abdomen: Soft, nontender, nondistended, normoactive bowel sounds  Lower extremities: color normal; temperature normal. Hair growth is not present. Calves are supple, nontender, approximately equally sized in comparison. Capillary refill <3 sec            Ulcer Description:   Wound Abdomen (Active)   Number of days: 26       Wound Abdomen Lower;Mid (Active)   Dressing Status Old drainage 20 0910   Non-staged Wound Description Full thickness 20 0910   Wound Length (cm) 0.5 cm 20 0910   Wound Width (cm) 5 cm 20 0910   Wound Depth (cm) 3 cm 20 0910   Wound Surface Area (cm^2) 2.5 cm^2 20 0910   Wound Volume (cm^3) 7.5 cm^3 20 0910   Condition of Base Granulation 20 0910   Condition of Edges Open 20 0910   Assessment Red 20 0910   Tissue Type Percent Red 100 20 0910   Drainage Amount Moderate 20 0910   Drainage Color Serosanguinous 20 0910   Wound Odor None 20 0910   Alysha-wound Assessment Intact 20 0910   Cleansing and Cleansing Agents  Normal saline 20 0910   Dressing Changed Changed/New 20 0910   Number of days: 0         Data Review:   No results found for this or any previous visit (from the past 24 hour(s)). Assessment:     34 y.o. female with  postsurgical ulcer.     Problem List  Date Reviewed: 2020          Codes Class Noted    Postpartum  wound disruption ICD-10-CM: O90.0  ICD-9-CM: 674.14  2020        Headache in pregnancy, postpartum ICD-10-CM: O90.89, R51  ICD-9-CM: 646.84, 784.0  9/3/2020        Headache ICD-10-CM: R51  ICD-9-CM: 784.0  2020        HTN in pregnancy, chronic ICD-10-CM: O10.919  ICD-9-CM: 642.00  2020        38 weeks gestation of pregnancy ICD-10-CM: Z3A.38  ICD-9-CM: V22.2  2020        Body mass index (BMI) of 45.0 to 49.9 in adult [facility]) ICD-10-CM: F73.71  ICD-9-CM: V85.42  2020        Encounter for planned induction of labor ICD-10-CM: Z34.90  ICD-9-CM: V22.1  2020        SGA (small for gestational age), fetal, affecting care of mother, antepartum ICD-10-CM: O36.5990  ICD-9-CM: 656.53  2020    Overview Signed 2020  9:59 AM by Ivy Oquendo MD       2020; 12.9%; BPP 3.4%; HC 8%; AC 12%; recommend twice weekly testing till delivery             Constipation during pregnancy in third trimester ICD-10-CM: O99.613, K59.00  ICD-9-CM: 648.93, 564.00  2020    Overview Signed 2020  1:58 PM by Jacquie Payne RN     2020 at [facility]:  Patient taking Zofran daily; C/O constipation. Discussed Miralax BID, increase PO hydration, and stool softener. See High Risk Pregnancy Overview             Maternal tobacco use, antepartum ICD-10-CM: O99.330  ICD-9-CM: 649.03  2020    Overview Addendum 2020  1:45 PM by Jacquie Payne, BECKY     2020; recommend quit (especially with asthma)  2020 at [facility]:  Smoking 3 cigarettes/day (previously smoking 1 ppd). Rubella non-immune status, antepartum ICD-10-CM: O99.89, Z28.3  ICD-9-CM: 646.83, V15.83  4/15/2020        Chronic hypertension complicating or reason for care during pregnancy, third trimester ICD-10-CM: O10.913  ICD-9-CM: 642.03, 401.9  4/15/2020    Overview Addendum 2020  1:56 PM by Jacquie Payne RN     4/15/2020 [facility]: upper normal to mild range BP noted throughout last several years. Normal HELLP labs at baseline. 6/17/2020 at [facility]:  BP stable at 122/86, no preeclamptic symptoms. See High Risk Pregnancy Overview             High-risk pregnancy in third trimester ICD-10-CM: O09.93  ICD-9-CM: V23.9  2/26/2020    Overview Addendum 6/17/2020  1:58 PM by Nae De Luna RN     2/26/2020 at [facility]:  Normal NT, NB present. Genetic counseling done by physician. Pt declines testing. 4/15/2020 at [facility]:  Normal anatomy and echo except limited by MBH, AC 28%, MARTÍN WNL. Baseline HELLP WNL, P/C ratio 0.89  6/17/2020 at [facility]:  Appropriate fetal growth, normal f/u Echo. AC 51%, overall 52%, MARTÍN 20 cm    · No follow up scheduled at [facility]; will see back prn. Obesity affecting pregnancy in third trimester ICD-10-CM: O99.213  ICD-9-CM: 649.13  2/26/2020    Overview Addendum 4/15/2020  1:26 PM by Norman Ngo MD     2/26/2020 at [facility]:  BMI of 48. Earlky     See High Risk Pregnancy Overview             Disease of respiratory system affecting pregnancy in second trimester ICD-10-CM: O99.512  ICD-9-CM: 646.83, 519.9  2/26/2020    Overview Addendum 2/26/2020 12:33 PM by Norman Ngo MD     2/26/2020 at [facility]:  Patient has never been diagnosed with Asthma; but was started on inhalers (Albuterol rescue, Symbicort maintenance) by allergist due to chronic allergies and cough. Pt states has never used rescue inhaler and supposed to use Symbicort daily, but only using \"maybe 1 time a week\". · Continue inhalers prn. · Seasonal allergy treatment    See High Risk Pregnancy Overview             Mental disorder affecting pregnancy in third trimester ICD-10-CM: O99.343  ICD-9-CM: 648.43  2/25/2020    Overview Addendum 6/17/2020  1:56 PM by Nae De Luna RN     2/26/2020 at [facility]:  Patient with depression/anxiety; diagnosed in early 2017. Being managed by [facility]); follows up every 6 months- they are unaware of patient's pregnancy.   Currently taking Zoloft 100mg am/50mg pm daily, Buspar 15 mg BID, and Xanax 1 mg < 1x/day. Stable on current medication regimen. Denies SI/HI. 4/15/2020 at [facility]: Pt states she is doing well. Taking xanax approx once a week. Denies SI/HI  6/17/2020 at [facility]:  Stable on Xanax 1/2 mg/week, Buspar 15 mg BID, and Zoloft 100 mg in AM/50 mg in PM.  Denies SI/HI. · Defer management to psychiatrist; next appt ~ July. Recommend seeing sooner if needed. · Try to wean off Xanax. · Continue Zoloft/Buspar, adjust prn for symptoms. See High Risk Pregnancy Overview             Medication exposure during first trimester of pregnancy ICD-10-CM: O09.891  ICD-9-CM: V23.89  2/25/2020    Overview Addendum 2/26/2020 10:16 AM by Nae De Luna RN     2/26/2020 at [facility]:  Patient with medication exposure to Xanax. Ordered as 4 mg BID prn. Pt states only taking 1 mg < 1x/day. · Try to wean off Xanax. See High Risk Pregnancy Overview                    Plan:     No orders of the defined types were placed in this encounter. Patient understood and agrees with plan. Questions answered. Follow-up Information     Follow up With Specialties Details Why Contact Info    [address] In 1 week  [place] [id number] [phone]             Any procedures done today in the [address] [facility],[address] are documented in a separate note in [address] [place] [address] and made part of this record by reference.      Dictated using voice recognition software; proofread, but unrecognized errors may exist.    Signed By: Daily Zimmerman MD     September 24, 2020

## 2020-09-24 NOTE — DISCHARGE INSTRUCTIONS
Abdominal Wound:  Cleanse wound with normal saline. Keep cover in shower. Change after shower. Apply Alginate with silver (rope); gently pack into wound, filling dead space. Change dressing 3 times a week or PRN by Home Health. *Stop smoking to aid in wound healing. *Do not lift more than baby. *Eat high protein diet.

## 2020-09-24 NOTE — WOUND CARE
09/24/20 0910   Wound Abdomen Lower;Mid   Date First Assessed/Time First Assessed: 09/24/20 0908   Present on Hospital Admission: Yes  Primary Wound Type: Open incision/surgical site  Location: Abdomen  Wound Location Orientation: Lower;Mid   Dressing Status Old drainage   Dressing Type   (Iodoform, maxi pad)   Non-staged Wound Description Full thickness   Wound Length (cm) 0.5 cm   Wound Width (cm) 5 cm   Wound Depth (cm) 3 cm   Wound Surface Area (cm^2) 2.5 cm^2   Wound Volume (cm^3) 7.5 cm^3   Condition of Base Granulation   Condition of Edges Open   Assessment Red   Tissue Type Percent Red 100   Drainage Amount Moderate   Drainage Color Serosanguinous   Wound Odor None   Alysha-wound Assessment Intact   Cleansing and Cleansing Agents  Normal saline   Dressing Changed Changed/New       Patient is not taking an anticoagulant.

## 2020-09-29 ENCOUNTER — TELEPHONE (OUTPATIENT)
Dept: CASE MANAGEMENT | Age: 30
End: 2020-09-29

## 2020-09-29 NOTE — TELEPHONE ENCOUNTER
Phone call to patient at [phone] to check-in due to EPDS score of 15 after delivery.     No answer; message left.     Jas Sales [address]   [phone]

## 2020-10-01 ENCOUNTER — HOSPITAL ENCOUNTER (OUTPATIENT)
Dept: WOUND CARE | Age: 30
Discharge: HOME | End: 2020-10-01
Attending: SURGERY
Payer: COMMERCIAL

## 2020-10-01 VITALS
SYSTOLIC BLOOD PRESSURE: 103 MMHG | HEIGHT: 67 IN | WEIGHT: 293 LBS | DIASTOLIC BLOOD PRESSURE: 72 MMHG | RESPIRATION RATE: 20 BRPM | BODY MASS INDEX: 45.99 KG/M2

## 2020-10-01 DIAGNOSIS — S31.109A OPEN WOUND OF ABDOMEN, INITIAL ENCOUNTER: ICD-10-CM

## 2020-10-01 PROCEDURE — 99204 OFFICE O/P NEW MOD 45 MIN: CPT | Performed by: SURGERY

## 2020-10-01 PROCEDURE — 99213 OFFICE O/P EST LOW 20 MIN: CPT

## 2020-10-01 NOTE — PROGRESS NOTES
WOUND CENTER H&P/Consult Note/Progress Note:   Cristine Bueno  MRN: [id number]  :1990  Age:29 y.o.    HPI: Cristine Bueno is a 34 y.o. female who I saw for the first time at the wound center on 10/1/2020. She is status post  in 2020 and developed a chronic open wound and was referred to the wound center. She has been receiving alginate rope packing 3 times a week. She has been draining through previously closed wound surfaces. Nothing in particular has made her wound better or worse. No associated fevers. This information was obtained from the patient  The following HPI elements were documented for the patient's wound:  Location:  incision wound  Duration: Since 2020. Severity: Moderate severity. Moderate to severe pain associated with the wound. Context:  BMI>46  Modifying Factors:  Nothing makes better or worse  Quality:    Timing:     Associated Signs and Symptoms: no fevers        Past Medical History:   Diagnosis Date    Abnormal weight gain 10/15/2015    Anxiety     Asthma     Chronic hypertension complicating or reason for care during pregnancy, second trimester 4/15/2020    Depression     Frequent headaches 10/15/2015    Gestational hypertension     Nicotine vapor product user     Postpartum depression     Striae purple 10/15/2015     Past Surgical History:   Procedure Laterality Date    HX  SECTION  2020    HX CHOLECYSTECTOMY  2016    HX WISDOM TEETH EXTRACTION      [age] years old    IR INJ BLOOD PATCH  2020     Current Outpatient Medications   Medication Sig    busPIRone (BUSPAR) 15 mg tablet Take 15 mg by mouth two (2) times a day.  ALPRAZolam (Xanax) 2 mg tablet Take 2 mg by mouth daily as needed for Anxiety.  sertraline (Zoloft) 50 mg tablet Take 50 mg by mouth nightly.  ibuprofen (MOTRIN) 800 mg tablet Take 1 Tab by mouth every six (6) hours as needed for Pain.     fexofenadine (ALLEGRA) 60 mg tablet Take 1 Tab by mouth two (2) times a day.  cetirizine (ZYRTEC) 10 mg tablet Take 1 Tab by mouth daily.  ondansetron (ZOFRAN ODT) 4 mg disintegrating tablet Take 1 Tab by mouth every eight (8) hours as needed for Nausea or Vomiting.  calcium carbonate (TUMS) 200 mg calcium (500 mg) chew Take 1 Tab by mouth daily.  prenatal vits62/FA/om3/dha/epa (PRENATAL GUMMY PO) Take  by mouth.  budesonide-formoterol (SYMBICORT) 160-4.5 mcg/actuation HFAA Take 2 Puffs by inhalation two (2) times a day.  albuterol (PROVENTIL HFA, VENTOLIN HFA, PROAIR HFA) 90 mcg/actuation inhaler Take 2 Puffs by inhalation every six (6) hours as needed for Wheezing.  sertraline (ZOLOFT) 100 mg tablet TAKE 1 TABLET BY MOUTH EVERY DAY     No current facility-administered medications for this encounter. ALLERGIES: Latex; Augmentin [amoxicillin-pot clavulanate]; and Metronidazole  Social History     Socioeconomic History    Marital status:      Spouse name: Not on file    Number of children: Not on file    Years of education: Not on file    Highest education level: Not on file   Tobacco Use    Smoking status: Current Every Day Smoker     Packs/day: 0.50     Years: 13.00     Pack years: 6.50     Types: Cigarettes    Smokeless tobacco: Never Used   Substance and Sexual Activity    Alcohol use: Not Currently     Alcohol/week: 0.0 standard drinks     Comment: occ    Drug use: Never    Sexual activity: Not Currently     Partners: Male     Birth control/protection: None   Other Topics Concern   Social History Narrative     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 3 dogs--husky, lab, and pit bull-pointer mix.        Social History     Tobacco Use   Smoking Status Current Every Day Smoker    Packs/day: 0.50    Years: 13.00    Pack years: 6.50    Types: Cigarettes   Smokeless Tobacco Never Used     Family History   Problem Relation Age of Onset    Kidney Disease Mother stones    Breast Cancer Mother     Diabetes Father     Arthritis-osteo Father     Thyroid Disease Maternal Grandmother     Hypertension Neg Hx        ROS: The patient has no difficulty with chest pain or shortness of breath. No fever or chills. Comprehensive review of systems was otherwise unremarkable except as noted above. Physical Exam:   Visit Vitals  /72 (BP 1 Location: Right arm, BP Patient Position: Sitting)   Resp 20   Ht 5' 7\" (1.702 m)   Wt 300 lb (136.1 kg)   BMI 46.99 kg/m²     Constitutional: Alert, oriented, cooperative patient in no acute distress; appears stated age;  General appearance is within normal limits for wound care patient population. See the today's recorded vitals signs and constitutional data. Eyes: Pupils equal; Sclera are clear. EOMs intact; The eyes appear to track and move normally. The sclera are not injected. The conjunctive are clear. The eyelids are normal. There is no scleral icterus. ENMT: There are no obvious external ear, nose, lip or mouth lesions. Nares normal; Neck: Overall contour of the neck is normal with no obvious neck masses. Gross hearing is within normal limits. No obvious neck masses  CV: RRR;  no JVD; No evidence of cyanosis of the upper extremities. The extremities are perfused without embolic sign, splinter hemorrhages, or petechia. Resp:  Breathing is non-labored; normal rate and effort; no audible wheezing. GI: soft and non-distended without acute abnormality noted. Musculoskeletal: unremarkable with normal function. No embolic signs or cyanosis. Neuro:  Oriented; moves all 4; no focal deficits  Psychiatric: Judgement and insight are within normal limits for the wound care population of patients. Patient is oriented to person, place, and time. Recent and remote memory are within normal limits. Mood and affect are within normal limits. Integumentary (Skin/Wounds)  See inspection of wound(s) below.  There are no other skin areas of palpable concern. See wound center documentation including photos in the [facility] [address] [address] Wound Template made part of this record by reference. Wounds:  Wound Abdomen (Active)   Number of days: 33       Wound Abdomen Lower;Mid (Active)   Dressing Status Clean, dry, and intact 10/01/20 0907   Non-staged Wound Description Full thickness 10/01/20 0907   Wound Length (cm) 1.5 cm 10/01/20 0907   Wound Width (cm) 5 cm 10/01/20 0907   Wound Depth (cm) 3.5 cm 10/01/20 0907   Wound Surface Area (cm^2) 7.5 cm^2 10/01/20 0907   Wound Volume (cm^3) 26.25 cm^3 10/01/20 0907   Change in Wound Size % -200 10/01/20 0907   Condition of Base Granulation 10/01/20 0907   Condition of Edges Open 09/24/20 0910   Assessment Red 09/24/20 0910   Tissue Type Percent Red 100 10/01/20 0907   Drainage Amount Moderate 10/01/20 0907   Drainage Color Serosanguinous 10/01/20 0907   Wound Odor None 10/01/20 0907   Alysha-wound Assessment Intact 10/01/20 0907   Cleansing and Cleansing Agents  Soap and water;Normal saline 10/01/20 0907   Dressing Changed Changed/New 10/01/20 0907   Number of days: 7                Recent vitals (if inpt):  Patient Vitals for the past 24 hrs:   BP Resp Height Weight   10/01/20 0905 103/72 20     10/01/20 0858   5' 7\" (1.702 m) 300 lb (136.1 kg)       Labs:  No results for input(s): WBC, HGB, PLT, NA, K, CL, CO2, BUN, CREA, GLU, PTP, INR, APTT, TBIL, TBILI, CBIL, ALT, AP, AML, LPSE, LCAD, NH4, TROPT, TROIQ, PCO2, PO2, HCO3, HGBEXT, PLTEXT, INREXT, HGBEXT, PLTEXT, INREXT in the last 72 hours.     No lab exists for component: SGOT, GPT,  PH    Lab Results   Component Value Date/Time    WBC 8.5 09/16/2020 05:43 PM    HGB 11.6 (L) 09/16/2020 05:43 PM    PLATELET 739 (H) 49/84/9141 05:43 PM    Sodium 139 09/16/2020 05:43 PM    Potassium 3.7 09/16/2020 05:43 PM    Chloride 107 09/16/2020 05:43 PM    CO2 27 09/16/2020 05:43 PM    BUN 13 09/16/2020 05:43 PM    Creatinine 1.05 (H) 09/16/2020 05:43 PM    Glucose 79 2020 05:43 PM    Bilirubin, total 0.2 2020 05:43 PM    ALT (SGPT) 30 2020 05:43 PM    Alk. phosphatase 147 (H) 2020 05:43 PM    Lipase 138 2019 05:15 PM       I reviewed recent labs and recent radiologic studies. I independently reviewed radiology images for studies I described above or studies I have ordered. Admission date (for inpatients): 10/1/2020   * No surgery found *  * No surgery found *      ASSESSMENT/PLAN:  Problem List  Date Reviewed: 2020          Codes Class Noted    Open wound of lower midline pelvic region (C Section incision) ICD-10-CM: S31.109A  ICD-9-CM: 879.2  10/1/2020        Postpartum  wound disruption ICD-10-CM: O90.0  ICD-9-CM: 674.14  2020        Headache in pregnancy, postpartum ICD-10-CM: O90.89, R51.9  ICD-9-CM: 646.84, 784.0  9/3/2020        Headache ICD-10-CM: R51.9  ICD-9-CM: 784.0  2020        HTN in pregnancy, chronic ICD-10-CM: O10.919  ICD-9-CM: 642.00  2020        38 weeks gestation of pregnancy ICD-10-CM: Z3A.38  ICD-9-CM: V22.2  2020        Body mass index (BMI) of 45.0 to 49.9 in adult [facility]) ICD-10-CM: L71.82  ICD-9-CM: V85.42  2020        Encounter for planned induction of labor ICD-10-CM: Z34.90  ICD-9-CM: V22.1  2020        SGA (small for gestational age), fetal, affecting care of mother, antepartum ICD-10-CM: O36.5990  ICD-9-CM: 656.53  2020    Overview Signed 2020  9:59 AM by Cyn Brooks MD       2020; 12.9%; BPP 3.4%; HC 8%; AC 12%; recommend twice weekly testing till delivery             Constipation during pregnancy in third trimester ICD-10-CM: O99.613, K59.00  ICD-9-CM: 648.93, 564.00  2020    Overview Signed 2020  1:58 PM by Javier Amos RN     2020 at [facility]:  Patient taking Zofran daily; C/O constipation. Discussed Miralax BID, increase PO hydration, and stool softener.     See High Risk Pregnancy Overview             Maternal tobacco use, antepartum ICD-10-CM: O99.330  ICD-9-CM: 649.03  5/19/2020    Overview Addendum 6/17/2020  1:45 PM by Diego Marin RN     5/19/2020; recommend quit (especially with asthma)  6/17/2020 at [facility]:  Smoking 3 cigarettes/day (previously smoking 1 ppd). Rubella non-immune status, antepartum ICD-10-CM: O99.891, Z28.3  ICD-9-CM: 646.83, V15.83  4/15/2020        Chronic hypertension complicating or reason for care during pregnancy, third trimester ICD-10-CM: O10.913  ICD-9-CM: 642.03, 401.9  4/15/2020    Overview Addendum 6/17/2020  1:56 PM by Diego Marin RN     4/15/2020 [facility]: upper normal to mild range BP noted throughout last several years. Normal HELLP labs at baseline. 6/17/2020 at [facility]:  BP stable at 122/86, no preeclamptic symptoms. See High Risk Pregnancy Overview             High-risk pregnancy in third trimester ICD-10-CM: O09.93  ICD-9-CM: V23.9  2/26/2020    Overview Addendum 6/17/2020  1:58 PM by Diego Marin RN     2/26/2020 at [facility]:  Normal NT, NB present. Genetic counseling done by physician. Pt declines testing. 4/15/2020 at [facility]:  Normal anatomy and echo except limited by MBH, AC 28%, MARTÍN WNL. Baseline HELLP WNL, P/C ratio 0.89  6/17/2020 at [facility]:  Appropriate fetal growth, normal f/u Echo. AC 51%, overall 52%, MARTÍN 20 cm    · No follow up scheduled at [facility]; will see back prn. Obesity affecting pregnancy in third trimester ICD-10-CM: O99.213  ICD-9-CM: 649.13  2/26/2020    Overview Addendum 4/15/2020  1:26 PM by Arun Sanchez MD     2/26/2020 at [facility]:  BMI of 48.    [place]     See High Risk Pregnancy Overview             Disease of respiratory system affecting pregnancy in second trimester ICD-10-CM: O99.512  ICD-9-CM: 646.83, 519.9  2/26/2020    Overview Addendum 2/26/2020 12:33 PM by Arun Sanchez MD     2/26/2020 at [facility]:  Patient has never been diagnosed with Asthma; but was started on inhalers (Albuterol rescue, Symbicort maintenance) by allergist due to chronic allergies and cough. Pt states has never used rescue inhaler and supposed to use Symbicort daily, but only using \"maybe 1 time a week\". · Continue inhalers prn. · Seasonal allergy treatment    See High Risk Pregnancy Overview             Mental disorder affecting pregnancy in third trimester ICD-10-CM: O99.343  ICD-9-CM: 648.43  2020    Overview Addendum 2020  1:56 PM by Shanda Martell RN     2020 at [facility]:  Patient with depression/anxiety; diagnosed in early 2017. Being managed by [facility], [facility]; follows up every 6 months- they are unaware of patient's pregnancy. Currently taking Zoloft 100mg am/50mg pm daily, Buspar 15 mg BID, and Xanax 1 mg < 1x/day. Stable on current medication regimen. Denies SI/HI. 4/15/2020 at [facility]: Pt states she is doing well. Taking xanax approx once a week. Denies SI/HI  2020 at [facility]:  Stable on Xanax 1/2 mg/week, Buspar 15 mg BID, and Zoloft 100 mg in AM/50 mg in PM.  Denies SI/HI. · Defer management to psychiatrist; next appt ~ July. Recommend seeing sooner if needed. · Try to wean off Xanax. · Continue Zoloft/Buspar, adjust prn for symptoms. See High Risk Pregnancy Overview             Medication exposure during first trimester of pregnancy ICD-10-CM: O09.891  ICD-9-CM: V23.89  2020    Overview Addendum 2020 10:16 AM by Shanda Martell RN     2020 at [facility]:  Patient with medication exposure to Xanax. Ordered as 4 mg BID prn. Pt states only taking 1 mg < 1x/day. · Try to wean off Xanax.     See High Risk Pregnancy Overview                 Active Problems:    Postpartum  wound disruption (2020)      Open wound of lower midline pelvic region (C Section incision) (10/1/2020)         Any procedures done today in the wound center are documented in a separate note in connect care made part of this record by reference    Wound Care  Orders Placed This Encounter    WOUND CARE, DRESSING CHANGE     Abdomen  Cleanse wound and periwound with wound cleanser or normal saline. Pack the wound daily with 2 pieces of dry gauze leaving a tail on each piece. Secure with abd pad. Instead of tape may hold in place with abdominal binder or pants. Home health to change 3x weekly and family to change on alternate days. Follow up in one week. Standing Status:   Standing     Number of Occurrences:   1           Postpartum  section wound disruption with open wound of lower midline pelvis  On 10/1/20 I changed her from alginate rope to a 4 x 4 gauze. The wound holds to 4 x 4's with the corner of each 4 x 4 left hanging out of the wound. I have concerns the alginate rope is not enough to absorb all of the moisture and may get lost in the wound particularly if someone was to cut more than 1 piece. I counseled her about the importance of making sure that each piece of gauze that is placed in the wound is removed the day after and discussed with her the risks of having retained foreign bodies in the wound. There is concern because this wound is deep and we cannot see its base. It is quite painful to palpation and she does not allow a lot of manipulation. I think the 4 x 4's will absorb the moisture and allow packing to be done each day to get this moisture controlled. I encouraged her to try to bring her  to the next visit so that we could help teach and give him some affirmation about his assistance with packing the wound is I do not think home health will go to her home daily. We will ask for home health to provide teaching and assistance for the wound care. We will see her back next week.             Follow-up Information     Follow up With Specialties Details Why Contact Info    [address] In 1 week  Stephen Guzman [address] [facility] [address] [phone]            I have personally performed a face-to-face diagnostic evaluation and management  service on this patient. I have independently seen the patient. I have independently obtained the above history from the patient/family. I have independently examined the patient with above findings. I have independently reviewed data/labs for this patient and developed the above plan of care (MDM). Signed: Kolby Birch.  Nirmal Henry MD, FACS

## 2020-10-01 NOTE — WOUND CARE
[address] [place] [address] [address] Phone: [phone] Fax: [phone] Patient: Cristine Bueno MRN: [id number]  SSN: [id number] YOB: 1990  Age: 34 y.o. Sex: female Return Appointment: 1 week with Dorian Watson MD 
 
Instructions: Abdomen Cleanse wound and periwound with wound cleanser or normal saline. Pack the wound daily with 2 pieces of dry gauze leaving a tail on each piece. Secure with abd pad. Instead of tape may hold in place with abdominal binder or pants. Home health to change 3x weekly and family to change on alternate days. Follow up in one week. Should you experience increased redness, swelling, pain, foul odor, size of wound(s), or have a temperature over 101 degrees please contact the [address] [place] [address] Road at [phone] or if after hours contact your primary care physician or go to the hospital emergency department. Signed By: Maria T Park October 1, 2020

## 2020-10-01 NOTE — DISCHARGE INSTRUCTIONS
[address]  [address], [address]  Phone: [phone]  Fax: [phone]    Patient: Kade Quiñones MRN: [id number]  SSN: [id number]    YOB: 1990  Age: 34 y.o. Sex: female       Return Appointment: 1 week with Suraj Patel MD    Instructions: Abdomen  Cleanse wound and periwound with wound cleanser or normal saline. Pack the wound daily with 2 pieces of dry gauze leaving a tail on each piece. Secure with abd pad. Instead of tape may hold in place with abdominal binder or pants. Home health to change 3x weekly and family to change on alternate days. Follow up in one week. Should you experience increased redness, swelling, pain, foul odor, size of wound(s), or have a temperature over 101 degrees please contact the [address] [place] [address] Road at [phone] or if after hours contact your primary care physician or go to the hospital emergency department.     Signed By: Barrett Ireland     October 1, 2020

## 2020-10-01 NOTE — WOUND CARE
10/01/20 0624 Wound Abdomen Lower;Mid Date First Assessed/Time First Assessed: 09/24/20 0908   Present on Hospital Admission: Yes  Primary Wound Type: Open incision/surgical site  Location: Abdomen  Wound Location Orientation: Lower;Mid  
Dressing Status Clean, dry, and intact Dressing Type  
(alginate, abd) Non-staged Wound Description Full thickness Wound Length (cm) 1.5 cm Wound Width (cm) 5 cm Wound Depth (cm) 3.5 cm Wound Surface Area (cm^2) 7.5 cm^2 Wound Volume (cm^3) 26.25 cm^3 Change in Wound Size % -200 Condition of Base Granulation Tissue Type Percent Red 100 Drainage Amount Moderate Drainage Color Serosanguinous Wound Odor None Alysha-wound Assessment Intact Cleansing and Cleansing Agents  Soap and water;Normal saline Dressing Changed Changed/New Dressing Type Applied (2 pieces of dry gauze) Photo not taken due to packing already placed. by

## 2020-10-13 ENCOUNTER — HOSPITAL ENCOUNTER (OUTPATIENT)
Dept: WOUND CARE | Age: 30
Discharge: HOME | End: 2020-10-13
Attending: SURGERY
Payer: COMMERCIAL

## 2020-10-13 ENCOUNTER — TELEPHONE (OUTPATIENT)
Dept: CASE MANAGEMENT | Age: 30
End: 2020-10-13

## 2020-10-13 VITALS
WEIGHT: 293 LBS | OXYGEN SATURATION: 98 % | HEIGHT: 67 IN | TEMPERATURE: 97.1 F | DIASTOLIC BLOOD PRESSURE: 97 MMHG | BODY MASS INDEX: 45.99 KG/M2 | SYSTOLIC BLOOD PRESSURE: 164 MMHG | HEART RATE: 93 BPM | RESPIRATION RATE: 18 BRPM

## 2020-10-13 DIAGNOSIS — S31.109S OPEN WOUND OF ABDOMEN, SEQUELA: ICD-10-CM

## 2020-10-13 PROCEDURE — 99213 OFFICE O/P EST LOW 20 MIN: CPT

## 2020-10-13 PROCEDURE — 99213 OFFICE O/P EST LOW 20 MIN: CPT | Performed by: PHYSICAL MEDICINE & REHABILITATION

## 2020-10-13 NOTE — DISCHARGE INSTRUCTIONS
[address]  [address], [address]  Phone: [phone]  Fax: [phone]    Patient: Romulo Tamayo MRN: [id number]  SSN: [id number]    YOB: 1990  Age: 27 y.o. Sex: female       Return Appointment: 1 week with Alvarado Galarza MD    Instructions:   Abdomen:  Cleanse wound and periwound with wound cleanser or normal saline. Pack the wound daily with 2 pieces of dry gauze leaving a tail on each piece. Secure with abd pad. Instead of tape may hold in place with abdominal binder or pants.     Home health to change 3x weekly and family to change on alternate days. Follow up in one week. Should you experience increased redness, swelling, pain, foul odor, size of wound(s), or have a temperature over 101 degrees please contact the [address] [place] [address] Road at [phone] or if after hours contact your primary care physician or go to the hospital emergency department.     Signed By: Deo Tillman RN     October 13, 2020

## 2020-10-13 NOTE — PROGRESS NOTES
[address], [address], [address] [facility] [address]  [phone]    Progress Notes   Lindsey Minaya       MRN: [id number]     : 1990     Age: 27 y.o. Subjective/HPI:   This patient is a 27 y.o. female seen and evaluated at the wound clinic for recheck of her  incision dehiscence. She has had no fever or chills and no pain now. Home health is changing her dressing 3 days per week and her  is changing it 4 days per week. No purulence. Review of Systems  A comprehensive review of systems was negative except for that written in the HPI. Past Medical History:   Diagnosis Date    Abnormal weight gain 10/15/2015    Anxiety     Asthma     Chronic hypertension complicating or reason for care during pregnancy, second trimester 4/15/2020    Depression     Frequent headaches 10/15/2015    Gestational hypertension     Nicotine vapor product user     Postpartum depression     Striae purple 10/15/2015      Past Surgical History:   Procedure Laterality Date    HX  SECTION  2020    HX CHOLECYSTECTOMY  2016    HX WISDOM TEETH EXTRACTION      [age] years old    IR INJ BLOOD PATCH  2020      Allergies   Allergen Reactions    Latex Rash    Augmentin [Amoxicillin-Pot Clavulanate] Rash    Metronidazole Hives and Itching      Social History     Tobacco Use    Smoking status: Current Every Day Smoker     Packs/day: 0.50     Years: 13.00     Pack years: 6.50     Types: Cigarettes    Smokeless tobacco: Never Used   Substance Use Topics    Alcohol use: Not Currently     Alcohol/week: 0.0 standard drinks     Comment: occ      Social History     Social History Narrative     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 3 dogs--husky, lab, and pit bull-pointer mix.        Family History   Problem Relation Age of Onset    Kidney Disease Mother         stones    Breast Cancer Mother     Diabetes Father  Arthritis-osteo Father     Thyroid Disease Maternal Grandmother     Hypertension Neg Hx       Cannot display prior to admission medications because the patient has not been admitted in this contact. Current Outpatient Medications   Medication Sig    norgestimate-ethinyl estradioL (Tri-Sprintec, 28,) 0.18/0.215/0.25 mg-35 mcg (28) tab Take 1 Tab by mouth daily.  busPIRone (BUSPAR) 15 mg tablet Take 15 mg by mouth two (2) times a day.  ALPRAZolam (Xanax) 2 mg tablet Take 2 mg by mouth daily as needed for Anxiety.  sertraline (Zoloft) 50 mg tablet Take 50 mg by mouth nightly.  ibuprofen (MOTRIN) 800 mg tablet Take 1 Tab by mouth every six (6) hours as needed for Pain.  fexofenadine (ALLEGRA) 60 mg tablet Take 1 Tab by mouth two (2) times a day.  cetirizine (ZYRTEC) 10 mg tablet Take 1 Tab by mouth daily.  ondansetron (ZOFRAN ODT) 4 mg disintegrating tablet Take 1 Tab by mouth every eight (8) hours as needed for Nausea or Vomiting.  calcium carbonate (TUMS) 200 mg calcium (500 mg) chew Take 1 Tab by mouth daily.  prenatal vits62/FA/om3/dha/epa (PRENATAL GUMMY PO) Take  by mouth.  budesonide-formoterol (SYMBICORT) 160-4.5 mcg/actuation HFAA Take 2 Puffs by inhalation two (2) times a day.  albuterol (PROVENTIL HFA, VENTOLIN HFA, PROAIR HFA) 90 mcg/actuation inhaler Take 2 Puffs by inhalation every six (6) hours as needed for Wheezing.  sertraline (ZOLOFT) 100 mg tablet TAKE 1 TABLET BY MOUTH EVERY DAY     No current facility-administered medications for this encounter. Objective:     Vitals:    10/13/20 0808   BP: (!) 164/97   Pulse: 93   Resp: 18   Temp: 97.1 °F (36.2 °C)   SpO2: 98%   Weight: 306 lb (138.8 kg)   Height: 5' 7\" (1.702 m)       Physical Exam:  BMI: very high  Ambulation: ambulatory  Gen- the patient is well developed and in no acute distress  HEENT- no focal abnormalities of the scalp or face.   Neck- no JVD or retractions  Lungs- normal respiratory effort. Skin- no jaundice or rashes. Please see the specific measurements and descriptions of the wound below. There is no evidence of periwound induration or warmth. No purulence or odor. Granulation tissue is present and healthy. Base of the wound can be visualized now. Neuro- alert and oriented x 3. No gross imotor deficits are present. Psychological: Affect normal. Mood normal. Memory intact. Wound Abdomen (Active)   Number of days: 45       Wound Abdomen Lower;Mid (Active)   Dressing Status Clean; Intact 10/13/20 0818   Non-staged Wound Description Full thickness 10/13/20 0818   Wound Length (cm) 1 cm 10/13/20 0818   Wound Width (cm) 4.2 cm 10/13/20 0818   Wound Depth (cm) 1.4 cm 10/13/20 0818   Wound Surface Area (cm^2) 4.2 cm^2 10/13/20 0818   Wound Volume (cm^3) 5.88 cm^3 10/13/20 0818   Change in Wound Size % -68 10/13/20 0818   Condition of Base Granulation 10/13/20 0818   Condition of Edges Open 10/13/20 0818   Assessment Red 09/24/20 0910   Tissue Type Percent Red 100 10/13/20 0818   Drainage Amount Moderate 10/13/20 0818   Drainage Color Serosanguinous 10/13/20 0818   Wound Odor None 10/13/20 0818   Alysha-wound Assessment Intact 10/13/20 0818   Margins Attached edges 10/13/20 0818   Cleansing and Cleansing Agents  Normal saline; Soap and water 10/13/20 0818   Dressing Changed Other (Comment) 10/13/20 0818   Number of days: 19            Data Review     All Micro Results     None        All Micro Results     None        Radiology  Assessment:     Patient Active Problem List   Diagnosis Code    Mental disorder affecting pregnancy in third trimester O99.343    Medication exposure during first trimester of pregnancy O09.891    High-risk pregnancy in third trimester O09.93    Obesity affecting pregnancy in third trimester O99.213    Disease of respiratory system affecting pregnancy in second trimester O99.512    Rubella non-immune status, antepartum O99.891, Z28.3    Chronic hypertension complicating or reason for care during pregnancy, third trimester O10.913    Maternal tobacco use, antepartum O99.330    Constipation during pregnancy in third trimester O99.613, K59.00    SGA (small for gestational age), fetal, affecting care of mother, antepartum O41.80    HTN in pregnancy, chronic O10.919    38 weeks gestation of pregnancy Z3A.38    Body mass index (BMI) of 45.0 to 49.9 in adult [facility]) U18.29    Encounter for planned induction of labor Z34.90    Headache R51.9    Headache in pregnancy, postpartum O90.89, R51.9    Postpartum  wound disruption O90.0    Open wound of lower midline pelvic region (C Section incision) S31.109A     Plan: Active Orders   Nursing    WOUND CARE, DRESSING CHANGE     Frequency: ONE TIME     Number of Occurrences: 1 Occurrences     Order Comments: Abdomen:  Cleanse wound and periwound with wound cleanser or normal saline. Pack the wound daily with 2 pieces of dry gauze leaving a tail on each piece. Secure with abd pad. Instead of tape may hold in place with abdominal binder or pants.     Home health to change 3x weekly and family to change on alternate days. Follow up in one week. Follow-up Information     Follow up With Specialties Details Why Contact Info    [facility] [address] In 1 week For wound re-check [address]        There has been interval improvement in this abdominal wound with daily packing. No signs of infection at present. She may walk ad yohan. No lifting or straining. Continue with prenatal vitamins and increased protein and Vit C. Recheck in one week. Dictated using voice recognition software. Proofread, but unrecognized errors may exist.       Thank you very much for the opportunity to participate in this patient's care.       Signed By: Marcio Dawson MD     2020

## 2020-10-13 NOTE — WOUND CARE
[address] [place] [address] [address] Phone: [phone] Fax: [phone] Patient: Jacob Wolfe MRN: [id number]  SSN: [id number] YOB: 1990  Age: 27 y.o. Sex: female Return Appointment: 1 week with Rupinder Agudelo MD 
 
Instructions: 
Abdomen: 
Cleanse wound and periwound with wound cleanser or normal saline. Pack the wound daily with 2 pieces of dry gauze leaving a tail on each piece. Secure with abd pad. Instead of tape may hold in place with abdominal binder or pants. 
  
Home health to change 3x weekly and family to change on alternate days. Follow up in one week. Should you experience increased redness, swelling, pain, foul odor, size of wound(s), or have a temperature over 101 degrees please contact the [address] [place] [address] Road at [phone] or if after hours contact your primary care physician or go to the hospital emergency department. Signed By: Jules Arguello RN October 13, 2020

## 2020-10-13 NOTE — WOUND CARE
10/13/20 0818 Wound Abdomen Lower;Mid Date First Assessed/Time First Assessed: 09/24/20 0908   Present on Hospital Admission: Yes  Primary Wound Type: Open incision/surgical site  Location: Abdomen  Wound Location Orientation: Lower;Mid  
Dressing Status Clean; Intact Dressing Type  
(Gauze packing, ABD) Non-staged Wound Description Full thickness Wound Length (cm) 1 cm Wound Width (cm) 4.2 cm Wound Depth (cm) 1.4 cm Wound Surface Area (cm^2) 4.2 cm^2 Wound Volume (cm^3) 5.88 cm^3 Change in Wound Size % -68 Condition of Base Granulation Condition of Edges Open Tissue Type Percent Red 100 Drainage Amount Moderate Drainage Color Serosanguinous Wound Odor None Alysha-wound Assessment Intact Margins Attached edges Cleansing and Cleansing Agents  Normal saline; Soap and water Dressing Changed Other (Comment) Patient is not on ANTICOAGULANT THERAPY. Mechanically debrided with gauze and normal saline.

## 2020-10-13 NOTE — TELEPHONE ENCOUNTER
Phone call to patient at [phone] due to EPDS score of 15 after delivery.     Patient states that she never required a wound vac, but she is having to go to the wound center regularly for care. Discussed how patient is coping with post delivery difficulties. Per patient, Matias Smith had to increase my depression medicine. \"  SW offered to e-mail patient resources on local therapists that are in-network and specialize in  mood and anxiety disorders. Patient agreeable. E-mail: [email or web address].     Patient will have this 's contact information for any needs/questions.       MICAELA Brock  [place]   [phone]

## 2020-10-20 ENCOUNTER — HOSPITAL ENCOUNTER (OUTPATIENT)
Dept: WOUND CARE | Age: 30
Discharge: HOME | End: 2020-10-20
Attending: SURGERY
Payer: COMMERCIAL

## 2020-10-20 VITALS
BODY MASS INDEX: 45.99 KG/M2 | RESPIRATION RATE: 18 BRPM | DIASTOLIC BLOOD PRESSURE: 83 MMHG | SYSTOLIC BLOOD PRESSURE: 141 MMHG | HEIGHT: 67 IN | TEMPERATURE: 97.3 F | HEART RATE: 90 BPM | WEIGHT: 293 LBS

## 2020-10-20 DIAGNOSIS — O99.213 OBESITY AFFECTING PREGNANCY IN THIRD TRIMESTER: ICD-10-CM

## 2020-10-20 DIAGNOSIS — S31.109S OPEN WOUND OF ABDOMEN, SEQUELA: ICD-10-CM

## 2020-10-20 PROCEDURE — 99212 OFFICE O/P EST SF 10 MIN: CPT

## 2020-10-20 PROCEDURE — 99212 OFFICE O/P EST SF 10 MIN: CPT | Performed by: PHYSICAL MEDICINE & REHABILITATION

## 2020-10-20 NOTE — DISCHARGE INSTRUCTIONS
[address]  [address], [address]   Phone: [phone]  Fax: [phone]    Patient: Lindsey Minaya MRN: [id number]  SSN: [id number]    YOB: 1990  Age: 27 y.o. Sex: female       Return Appointment: 1 week with Shine Newby MD    Instructions:  Abdominal Wound:  Cleanse wound with normal saline. Keep cover in shower. Change after shower. Apply Alginate with silver (rope); gently pack into wound, filling dead space. Change dressing daily or every other day  or PRN by Home Health. Should you experience increased redness, swelling, pain, foul odor, size of wound(s), or have a temperature over 101 degrees please contact the [address] [place] [address] Road at [phone] or if after hours contact your primary care physician or go to the hospital emergency department.     Signed By: Darlena Bernheim, RN     October 20, 2020

## 2020-10-20 NOTE — WOUND CARE
[address] [place] [address], [address] [address] Phone: [phone] Fax: [phone] Patient: Kristi Guerrero MRN: [id number]  SSN: [id number] YOB: 1990  Age: 27 y.o. Sex: female Return Appointment: 1 week with Naz Hannon MD 
 
Instructions: Abdominal Wound: 
Cleanse wound with normal saline. Keep cover in shower. Change after shower. Apply Alginate with silver (rope); gently pack into wound, filling dead space. Change dressing daily or every other day  or PRN by Home Health.  
 
 
  
 
Should you experience increased redness, swelling, pain, foul odor, size of wound(s), or have a temperature over 101 degrees please contact the [address] [place] [address] Road at [phone] or if after hours contact your primary care physician or go to the hospital emergency department. Signed By: Karla Gutierrez RN October 20, 2020

## 2020-10-20 NOTE — PROGRESS NOTES
[address], [address], [address] [facility] [address]  [phone]    Progress Notes   Marino Arizmendi       MRN: [id number]     : 1990     Age: 27 y.o. Subjective/HPI:   This patient is a 27 y.o. female seen and evaluated at the wound clinic for recheck of an open abdominal wound due to incision dehiscence of her C section. No fever or chills. She went 36 hours without removing packing. .    Review of Systems  A comprehensive review of systems was negative except for that written in the HPI. Past Medical History:   Diagnosis Date    Abnormal weight gain 10/15/2015    Anxiety     Asthma     Chronic hypertension complicating or reason for care during pregnancy, second trimester 4/15/2020    Depression     Frequent headaches 10/15/2015    Gestational hypertension     Nicotine vapor product user     Postpartum depression     Striae purple 10/15/2015      Past Surgical History:   Procedure Laterality Date    HX  SECTION  2020    HX CHOLECYSTECTOMY  2016    HX WISDOM TEETH EXTRACTION      [age] years old    IR INJ BLOOD PATCH  2020      Allergies   Allergen Reactions    Latex Rash    Augmentin [Amoxicillin-Pot Clavulanate] Rash    Metronidazole Hives and Itching      Social History     Tobacco Use    Smoking status: Current Every Day Smoker     Packs/day: 0.50     Years: 13.00     Pack years: 6.50     Types: Cigarettes    Smokeless tobacco: Never Used   Substance Use Topics    Alcohol use: Not Currently     Alcohol/week: 0.0 standard drinks     Comment: occ      Social History     Social History Narrative     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 3 dogs--husky, lab, and pit bull-pointer mix.        Family History   Problem Relation Age of Onset    Kidney Disease Mother         stones    Breast Cancer Mother     Diabetes Father     Arthritis-osteo Father     Thyroid Disease Maternal Grandmother     Hypertension Neg Hx       Cannot display prior to admission medications because the patient has not been admitted in this contact. Current Outpatient Medications   Medication Sig    norgestimate-ethinyl estradioL (Tri-Sprintec, 28,) 0.18/0.215/0.25 mg-35 mcg (28) tab Take 1 Tab by mouth daily.  busPIRone (BUSPAR) 15 mg tablet Take 15 mg by mouth two (2) times a day.  ALPRAZolam (Xanax) 2 mg tablet Take 2 mg by mouth daily as needed for Anxiety.  sertraline (Zoloft) 50 mg tablet Take 50 mg by mouth nightly.  ibuprofen (MOTRIN) 800 mg tablet Take 1 Tab by mouth every six (6) hours as needed for Pain.  fexofenadine (ALLEGRA) 60 mg tablet Take 1 Tab by mouth two (2) times a day.  cetirizine (ZYRTEC) 10 mg tablet Take 1 Tab by mouth daily.  ondansetron (ZOFRAN ODT) 4 mg disintegrating tablet Take 1 Tab by mouth every eight (8) hours as needed for Nausea or Vomiting.  calcium carbonate (TUMS) 200 mg calcium (500 mg) chew Take 1 Tab by mouth daily.  prenatal vits62/FA/om3/dha/epa (PRENATAL GUMMY PO) Take  by mouth.  budesonide-formoterol (SYMBICORT) 160-4.5 mcg/actuation HFAA Take 2 Puffs by inhalation two (2) times a day.  albuterol (PROVENTIL HFA, VENTOLIN HFA, PROAIR HFA) 90 mcg/actuation inhaler Take 2 Puffs by inhalation every six (6) hours as needed for Wheezing.  sertraline (ZOLOFT) 100 mg tablet TAKE 1 TABLET BY MOUTH EVERY DAY     No current facility-administered medications for this encounter. Objective:     Vitals:    10/20/20 0816 10/20/20 0818   BP:  (!) 141/83   Pulse:  90   Resp: 18    Temp: 97.3 °F (36.3 °C)    Weight: 306 lb (138.8 kg)    Height: 5' 7\" (1.702 m)        Physical Exam:  BMI: high  Ambulation: ambulatory  Gen- the patient is well developed and in no acute distress  HEENT- no focal abnormalities of the scalp or face. Neck- no JVD or retractions  Lungs- normal respiratory effort. Abd- soft and no masses evident.   Skin- no jaundice or rashes. Please see the specific measurements and descriptions of the wound(s) below. There is no evidence of periwound induration or warmth. No purulence or odor. Neuro- alert and oriented x 3. No gross imotor deficits are present. Psychological: Affect normal. Mood normal. Memory intact.      Wound Abdomen (Active)   Number of days: 52       Wound Abdomen Lower;Mid (Active)   Dressing Status Clean, dry, and intact 10/20/20 0819   Non-staged Wound Description Full thickness 10/20/20 0819   Wound Length (cm) 1 cm 10/20/20 0819   Wound Width (cm) 3.2 cm 10/20/20 0819   Wound Depth (cm) 1.3 cm 10/20/20 0819   Wound Surface Area (cm^2) 3.2 cm^2 10/20/20 0819   Wound Volume (cm^3) 4.16 cm^3 10/20/20 0819   Change in Wound Size % -28 10/20/20 0819   Condition of Base Granulation 10/20/20 0819   Condition of Edges Open 10/13/20 0818   Assessment Red 10/20/20 0819   Tissue Type Percent Red 100 10/20/20 0819   Drainage Amount Moderate 10/20/20 0819   Drainage Color Serosanguinous 10/20/20 0819   Wound Odor Mild 10/20/20 0819   Alysha-wound Assessment Intact 10/20/20 0819   Margins Attached edges 10/13/20 0818   Cleansing and Cleansing Agents  Normal saline 10/20/20 0819   Dressing Changed Changed/New 10/20/20 0819   Number of days: 26            Data Review     All Micro Results     None        All Micro Results     None        Radiology  Assessment:     Patient Active Problem List   Diagnosis Code    Mental disorder affecting pregnancy in third trimester O99.343    Medication exposure during first trimester of pregnancy O09.891    High-risk pregnancy in third trimester O09.93    Obesity affecting pregnancy in third trimester O99.213    Disease of respiratory system affecting pregnancy in second trimester O99.512    Rubella non-immune status, antepartum O99.891, Z28.3    Chronic hypertension complicating or reason for care during pregnancy, third trimester O10.913    Maternal tobacco use, antepartum O99.330    Constipation during pregnancy in third trimester O99.613, K59.00    SGA (small for gestational age), fetal, affecting care of mother, antepartum O41.80    HTN in pregnancy, chronic O10.919    38 weeks gestation of pregnancy Z3A.38    Body mass index (BMI) of 45.0 to 49.9 in adult [facility]) M45.70    Encounter for planned induction of labor Z34.90    Headache R51.9    Headache in pregnancy, postpartum O90.89, R51.9    Postpartum  wound disruption O90.0    Open wound of lower midline pelvic region (C Section incision) S31.109A     Plan: Active Orders   Nursing    WOUND CARE, DRESSING CHANGE     Frequency: ONE TIME     Number of Occurrences: 1 Occurrences     Order Comments: Abdominal Wound:  Cleanse wound with normal saline. Keep cover in shower. Change after shower. Apply Alginate with silver (rope); gently pack into wound, filling dead space. Change dressing daily or every other day  or PRN by Home Health. Follow-up Information     Follow up With Specialties Details Why Contact Info    [address] In 1 week  [place] [address] [place], [place] [id number] [phone]        She does not consent to chemical cauterization. We will switch to alginate with silver and recheck in one week. No purulence noted. Baby doing well. Dictated using voice recognition software. Proofread, but unrecognized errors may exist.       Thank you very much for the opportunity to participate in this patient's care.       Signed By: Demian Taylor MD     2020

## 2020-10-20 NOTE — WOUND CARE
10/20/20 6717 Wound Abdomen Lower;Mid Date First Assessed/Time First Assessed: 09/24/20 0908   Present on Hospital Admission: Yes  Primary Wound Type: Open incision/surgical site  Location: Abdomen  Wound Location Orientation: Lower;Mid  
Dressing Status Clean, dry, and intact Dressing Type  
(gauze) Non-staged Wound Description Full thickness Wound Length (cm) 1 cm Wound Width (cm) 3.2 cm Wound Depth (cm) 1.3 cm Wound Surface Area (cm^2) 3.2 cm^2 Wound Volume (cm^3) 4.16 cm^3 Change in Wound Size % -28 Condition of Base Granulation Assessment Red Tissue Type Percent Red 100 Drainage Amount Moderate Drainage Color Serosanguinous Wound Odor Mild Alysha-wound Assessment Intact Cleansing and Cleansing Agents  Normal saline

## 2020-10-26 ENCOUNTER — TELEPHONE (OUTPATIENT)
Dept: CASE MANAGEMENT | Age: 30
End: 2020-10-26

## 2020-10-26 NOTE — TELEPHONE ENCOUNTER
Phone call to patient at [phone] due to EPDS score of 15 after delivery.     Patient states that she received my e-mail from several weeks ago with counseling/mental health resources. Per patient, she \"hasn't had time to do anything yet\" regarding this information. Patient states, \"I'm doing much better. \"      Patient denied any needs from  at this time.   Additionally, patient denied the need for a future follow-up phone call.       Patient will have this 's contact information for any needs/questions.       MICAELA Saucedo  [facility]   [phone]
EKG

## 2020-10-27 ENCOUNTER — HOSPITAL ENCOUNTER (OUTPATIENT)
Dept: WOUND CARE | Age: 30
Discharge: HOME | End: 2020-10-27
Attending: SURGERY

## 2020-11-03 ENCOUNTER — HOSPITAL ENCOUNTER (OUTPATIENT)
Dept: WOUND CARE | Age: 30
Discharge: HOME | End: 2020-11-03
Attending: SURGERY
Payer: COMMERCIAL

## 2020-11-03 VITALS
HEART RATE: 96 BPM | HEIGHT: 67 IN | DIASTOLIC BLOOD PRESSURE: 92 MMHG | SYSTOLIC BLOOD PRESSURE: 140 MMHG | TEMPERATURE: 97.3 F | BODY MASS INDEX: 45.99 KG/M2 | WEIGHT: 293 LBS

## 2020-11-03 DIAGNOSIS — S31.109S OPEN WOUND OF ABDOMEN, SEQUELA: ICD-10-CM

## 2020-11-03 PROCEDURE — 99213 OFFICE O/P EST LOW 20 MIN: CPT | Performed by: PHYSICAL MEDICINE & REHABILITATION

## 2020-11-03 PROCEDURE — 99213 OFFICE O/P EST LOW 20 MIN: CPT

## 2020-11-03 NOTE — PROGRESS NOTES
[address], [address], [address] [place] [address] Rd  [phone]    Progress Notes   Anisa Yeh       MRN: [id number]     : 1990     Age: 27 y.o. Subjective/HPI:   This patient is a 27 y.o. female seen and evaluated at the wound clinic for recheck of her open wound of the lower abdomen secondary to  section dehiscence. She comes in today accompanied by her baby. She has had no fever or chills but her wound has been a little more irritated recently. There has been less drainage. .    Review of Systems  A comprehensive review of systems was negative except for that written in the HPI. Past Medical History:   Diagnosis Date    Abnormal weight gain 10/15/2015    Anxiety     Asthma     Chronic hypertension complicating or reason for care during pregnancy, second trimester 4/15/2020    Depression     Frequent headaches 10/15/2015    Gestational hypertension     Nicotine vapor product user     Postpartum depression     Striae purple 10/15/2015      Past Surgical History:   Procedure Laterality Date    HX  SECTION  2020    HX CHOLECYSTECTOMY  2016    HX WISDOM TEETH EXTRACTION      [age] years old    IR INJ BLOOD PATCH  2020      Allergies   Allergen Reactions    Latex Rash    Augmentin [Amoxicillin-Pot Clavulanate] Rash    Metronidazole Hives and Itching      Social History     Tobacco Use    Smoking status: Current Every Day Smoker     Packs/day: 0.50     Years: 13.00     Pack years: 6.50     Types: Cigarettes    Smokeless tobacco: Never Used   Substance Use Topics    Alcohol use: Not Currently     Alcohol/week: 0.0 standard drinks     Comment: occ      Social History     Social History Narrative     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 3 dogs--husky, lab, and pit bull-pointer mix.        Family History   Problem Relation Age of Onset    Kidney Disease Mother         stones  Breast Cancer Mother     Diabetes Father     Arthritis-osteo Father     Thyroid Disease Maternal Grandmother     Hypertension Neg Hx       Cannot display prior to admission medications because the patient has not been admitted in this contact. Current Outpatient Medications   Medication Sig    norgestimate-ethinyl estradioL (Tri-Sprintec, 28,) 0.18/0.215/0.25 mg-35 mcg (28) tab Take 1 Tab by mouth daily.  busPIRone (BUSPAR) 15 mg tablet Take 15 mg by mouth two (2) times a day.  ALPRAZolam (Xanax) 2 mg tablet Take 2 mg by mouth daily as needed for Anxiety.  sertraline (Zoloft) 50 mg tablet Take 50 mg by mouth nightly.  ibuprofen (MOTRIN) 800 mg tablet Take 1 Tab by mouth every six (6) hours as needed for Pain.  fexofenadine (ALLEGRA) 60 mg tablet Take 1 Tab by mouth two (2) times a day.  cetirizine (ZYRTEC) 10 mg tablet Take 1 Tab by mouth daily.  ondansetron (ZOFRAN ODT) 4 mg disintegrating tablet Take 1 Tab by mouth every eight (8) hours as needed for Nausea or Vomiting.  calcium carbonate (TUMS) 200 mg calcium (500 mg) chew Take 1 Tab by mouth daily.  prenatal vits62/FA/om3/dha/epa (PRENATAL GUMMY PO) Take  by mouth.  budesonide-formoterol (SYMBICORT) 160-4.5 mcg/actuation HFAA Take 2 Puffs by inhalation two (2) times a day.  albuterol (PROVENTIL HFA, VENTOLIN HFA, PROAIR HFA) 90 mcg/actuation inhaler Take 2 Puffs by inhalation every six (6) hours as needed for Wheezing.  sertraline (ZOLOFT) 100 mg tablet TAKE 1 TABLET BY MOUTH EVERY DAY     No current facility-administered medications for this encounter. Objective:     Vitals:    11/03/20 0700 11/03/20 0818   BP:  (!) 140/92   Pulse:  96   Temp: 97.3 °F (36.3 °C)    Weight: 313 lb 12.8 oz (142.3 kg)    Height: 5' 7\" (1.702 m)        Physical Exam:  BMI: High BMI  Ambulation: Ambulatory  Gen- the patient is well developed and in no acute distress  HEENT- no focal abnormalities of the scalp or face.   Neck- no JVD or retractions  Lungs- normal respiratory effort. Abd- soft and no masses evident. Remainder of her incision has healed well. Skin- no jaundice or rashes. Please see the specific measurements and descriptions of the wound(s) below. There is no evidence of periwound induration or warmth. No purulence or odor. Psychological: Affect normal. Mood normal. Memory intact.      Wound Abdomen (Active)   Number of days: 66       Wound Abdomen Lower;Mid (Active)   Dressing Status Old drainage 11/03/20 0819   Non-staged Wound Description Full thickness 11/03/20 0819   Wound Length (cm) 1 cm 11/03/20 0819   Wound Width (cm) 3 cm 11/03/20 0819   Wound Depth (cm) 1.2 cm 11/03/20 0819   Wound Surface Area (cm^2) 3 cm^2 11/03/20 0819   Wound Volume (cm^3) 3.6 cm^3 11/03/20 0819   Change in Wound Size % -20 11/03/20 0819   Condition of Base Granulation 11/03/20 0819   Condition of Edges Open 10/13/20 0818   Assessment Red 10/20/20 0819   Tissue Type Percent Red 100 11/03/20 0819   Drainage Amount Moderate 11/03/20 0819   Drainage Color Serosanguinous 11/03/20 0819   Wound Odor None 11/03/20 0819   Alysha-wound Assessment Intact 11/03/20 0819   Margins Attached edges 10/13/20 0818   Cleansing and Cleansing Agents  Normal saline 11/03/20 0819   Dressing Changed Changed/New 11/03/20 0819   Number of days: 40            Data Review     All Micro Results     None        All Micro Results     None        Radiology  Assessment:     Patient Active Problem List   Diagnosis Code    Mental disorder affecting pregnancy in third trimester O99.343    Medication exposure during first trimester of pregnancy O09.891    High-risk pregnancy in third trimester O09.93    Obesity affecting pregnancy in third trimester O99.213    Disease of respiratory system affecting pregnancy in second trimester O99.512    Rubella non-immune status, antepartum O99.891, Z28.3    Chronic hypertension complicating or reason for care during pregnancy, third trimester O10.913    Maternal tobacco use, antepartum O99.330    Constipation during pregnancy in third trimester O99.613, K59.00    SGA (small for gestational age), fetal, affecting care of mother, antepartum O41.80    HTN in pregnancy, chronic O10.919    38 weeks gestation of pregnancy Z3A.38    Body mass index (BMI) of 45.0 to 49.9 in adult [facility]) B96.49    Encounter for planned induction of labor Z34.90    Headache R51.9    Headache in pregnancy, postpartum O90.89, R51.9    Postpartum  wound disruption O90.0    Open wound of lower midline pelvic region (C Section incision) S31.109A     Plan: Active Orders   Nursing    WOUND CARE, DRESSING CHANGE     Frequency: ONE TIME     Number of Occurrences: 1 Occurrences     Order Comments: Abdominal Wound:  Cleanse wound with normal saline. Keep cover in shower. Change after shower. Apply Alginate with silver (rope); gently pack into wound, filling dead space. Change dressing daily or every other day  or PRN by Home Health. Follow-up Information     Follow up With Specialties Details Why Contact Info    [address] In 1 week  [facility] White [address] [place] [id number] [phone]        She is highly sensitive to antibiotics. She has daily liquid stools which is her norm. She has been assessed for C. difficile colitis several times in the past.  She does not want to go on an oral antibiotic if at all possible. We will use the alginate silver into the wound bed and use a barrier ointment around to prevent any periwound inflammation. I will recheck her in 1 week. If she has not improved, we will culture and consider oral antibiotics. Dictated using voice recognition software. Proofread, but unrecognized errors may exist.       Thank you very much for the opportunity to participate in this patient's care.       Signed By: Minh Arvizu MD     November 3, 2020

## 2020-11-03 NOTE — WOUND CARE
11/03/20 0493 Wound Abdomen Lower;Mid Date First Assessed/Time First Assessed: 09/24/20 0908   Present on Hospital Admission: Yes  Primary Wound Type: Open incision/surgical site  Location: Abdomen  Wound Location Orientation: Lower;Mid  
Dressing Status Old drainage Dressing Type  
(silver algiante, foam) Non-staged Wound Description Full thickness Wound Length (cm) 1 cm Wound Width (cm) 3 cm Wound Depth (cm) 1.2 cm Wound Surface Area (cm^2) 3 cm^2 Wound Volume (cm^3) 3.6 cm^3 Change in Wound Size % -20 Condition of Base Granulation Tissue Type Percent Red 100 Drainage Amount Moderate Drainage Color Serosanguinous Wound Odor None Alysha-wound Assessment Intact Cleansing and Cleansing Agents  Normal saline Dressing Changed Changed/New No anticoagulants

## 2020-11-03 NOTE — WOUND CARE
[address] [place] [address] [address] Phone: [phone] Fax: [phone] Patient: Laurie Cordon MRN: [id number]  SSN: [id number] YOB: 1990  Age: 27 y.o. Sex: female Return Appointment: 1 week with Jonathon Matias MD 
 
Instructions: Abdominal Wound: 
Cleanse wound with normal saline. Keep cover in shower. Change after shower. Apply Alginate with silver (rope); gently pack into wound, filling dead space. Apply desitin or barrier cream to wound edges. Change dressing daily or every other day  or PRN by Home Health. Should you experience increased redness, swelling, pain, foul odor, size of wound(s), or have a temperature over 101 degrees please contact the [address] [place] [address] Road at [phone] or if after hours contact your primary care physician or go to the hospital emergency department. Signed By: Connor Lewis RN November 3, 2020

## 2020-11-10 ENCOUNTER — HOSPITAL ENCOUNTER (OUTPATIENT)
Dept: WOUND CARE | Age: 30
Discharge: HOME | End: 2020-11-10
Attending: SURGERY
Payer: COMMERCIAL

## 2020-11-10 VITALS — HEART RATE: 98 BPM | SYSTOLIC BLOOD PRESSURE: 131 MMHG | RESPIRATION RATE: 18 BRPM | DIASTOLIC BLOOD PRESSURE: 87 MMHG

## 2020-11-10 DIAGNOSIS — S31.109S OPEN WOUND OF ABDOMEN, SEQUELA: ICD-10-CM

## 2020-11-10 PROCEDURE — 99213 OFFICE O/P EST LOW 20 MIN: CPT

## 2020-11-10 PROCEDURE — 99213 OFFICE O/P EST LOW 20 MIN: CPT | Performed by: PHYSICAL MEDICINE & REHABILITATION

## 2020-11-10 NOTE — PROGRESS NOTES
[address], [address], [address] [facility] [address]  [phone]    Progress Notes   Rebeka Bedoya       MRN: [id number]     : 1990     Age: 27 y.o. Subjective/HPI:   This patient is a 27 y.o. female seen and evaluated at the wound clinic for recheck of her midline low abdominal open wound from  section dehiscence. She has had no fever or chills. Is Desitin ointment in the periwound area as a barrier cream for 2 days and then discontinued. She felt that it did assist in the skin irritation around the wound bed. Review of Systems  A comprehensive review of systems was negative except for that written in the HPI. Past Medical History:   Diagnosis Date    Abnormal weight gain 10/15/2015    Anxiety     Asthma     Chronic hypertension complicating or reason for care during pregnancy, second trimester 4/15/2020    Depression     Frequent headaches 10/15/2015    Gestational hypertension     Nicotine vapor product user     Postpartum depression     Striae purple 10/15/2015      Past Surgical History:   Procedure Laterality Date    HX  SECTION  2020    HX CHOLECYSTECTOMY  2016    HX WISDOM TEETH EXTRACTION      [age] years old    IR INJ BLOOD PATCH  2020      Allergies   Allergen Reactions    Latex Rash    Augmentin [Amoxicillin-Pot Clavulanate] Rash    Metronidazole Hives and Itching      Social History     Tobacco Use    Smoking status: Current Every Day Smoker     Packs/day: 0.50     Years: 13.00     Pack years: 6.50     Types: Cigarettes    Smokeless tobacco: Never Used   Substance Use Topics    Alcohol use: Not Currently     Alcohol/week: 0.0 standard drinks     Comment: occ      Social History     Social History Narrative     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 3 dogs--husky, lab, and pit bull-pointer mix.        Family History   Problem Relation Age of Onset    Kidney Disease Mother         stones    Breast Cancer Mother     Diabetes Father     Arthritis-osteo Father     Thyroid Disease Maternal Grandmother     Hypertension Neg Hx       Cannot display prior to admission medications because the patient has not been admitted in this contact. Current Outpatient Medications   Medication Sig    norgestimate-ethinyl estradioL (Tri-Sprintec, 28,) 0.18/0.215/0.25 mg-35 mcg (28) tab Take 1 Tab by mouth daily.  busPIRone (BUSPAR) 15 mg tablet Take 15 mg by mouth two (2) times a day.  ALPRAZolam (Xanax) 2 mg tablet Take 2 mg by mouth daily as needed for Anxiety.  sertraline (Zoloft) 50 mg tablet Take 50 mg by mouth nightly.  ibuprofen (MOTRIN) 800 mg tablet Take 1 Tab by mouth every six (6) hours as needed for Pain.  fexofenadine (ALLEGRA) 60 mg tablet Take 1 Tab by mouth two (2) times a day.  cetirizine (ZYRTEC) 10 mg tablet Take 1 Tab by mouth daily.  ondansetron (ZOFRAN ODT) 4 mg disintegrating tablet Take 1 Tab by mouth every eight (8) hours as needed for Nausea or Vomiting.  calcium carbonate (TUMS) 200 mg calcium (500 mg) chew Take 1 Tab by mouth daily.  prenatal vits62/FA/om3/dha/epa (PRENATAL GUMMY PO) Take  by mouth.  budesonide-formoterol (SYMBICORT) 160-4.5 mcg/actuation HFAA Take 2 Puffs by inhalation two (2) times a day.  albuterol (PROVENTIL HFA, VENTOLIN HFA, PROAIR HFA) 90 mcg/actuation inhaler Take 2 Puffs by inhalation every six (6) hours as needed for Wheezing.     sertraline (ZOLOFT) 100 mg tablet TAKE 1 TABLET BY MOUTH EVERY DAY     Current Facility-Administered Medications   Medication Dose Route Frequency    sodium hypochlorite (HALF STRENGTH DAKIN'S) 0.25% irrigation (bottle) 30 mL  30 mL Topical ONCE     Objective:     Vitals:    11/10/20 0821   BP: 131/87   Pulse: 98   Resp: 18       Physical Exam:  BMI: High BMI  Ambulation: Ambulatory  Gen- the patient is well developed and in no acute distress  HEENT- no focal abnormalities of the scalp or face. Neck- no JVD or retractions  Lungs- normal respiratory effort. Skin- no jaundice or rashes. Please see the specific measurements and descriptions of the wound below. There is no evidence of periwound induration or warmth. No purulence or odor. Neuro- alert and oriented x 3. No gross imotor deficits are present. Psychological: Affect normal. Mood normal. Memory intact. Wound Abdomen (Active)   Number of days: 73       Wound Abdomen Lower;Mid (Active)   Wound Image   11/10/20 0822   Dressing Status Clean;Dry; Intact 11/10/20 0822   Wound Length (cm) 1 cm 11/10/20 0822   Wound Width (cm) 3 cm 11/10/20 0822   Wound Depth (cm) 0.5 cm 11/10/20 0822   Wound Surface Area (cm^2) 3 cm^2 11/10/20 0822   Change in Wound Size % -20 11/10/20 0822   Wound Volume (cm^3) 1.5 cm^3 11/10/20 0822   Wound Healing % 80 11/10/20 0822   Wound Assessment Red 10/20/20 0819   Drainage Amount Small 11/10/20 0822   Drainage Description Serosanguinous 11/10/20 0822   Wound Odor None 11/10/20 0822   Alysha-Wound/Incision Assessment Intact 11/10/20 0822   Wound Thickness Description Full thickness 11/10/20 6326   Read-Only, Retired. Condition of Base Granulation 11/03/20 0819   Read-Only, Retired. Condition of Edges Open 10/13/20 0818   Read-Only, Retired. Tissue Type Percent Red 100 11/03/20 0819   Read-Only, Retired.  Cleansing and Cleansing Agents Normal saline 11/03/20 0819   Number of days: 47            Data Review     All Micro Results     None        All Micro Results     None        Radiology  Assessment:     Patient Active Problem List   Diagnosis Code    Mental disorder affecting pregnancy in third trimester O99.343    Medication exposure during first trimester of pregnancy O09.891    High-risk pregnancy in third trimester O09.93    Obesity affecting pregnancy in third trimester O99.213    Disease of respiratory system affecting pregnancy in second trimester O99.512    Rubella non-immune status, antepartum O99.891, Z28.3    Chronic hypertension complicating or reason for care during pregnancy, third trimester O10.913    Maternal tobacco use, antepartum O99.330    Constipation during pregnancy in third trimester O99.613, K59.00    SGA (small for gestational age), fetal, affecting care of mother, antepartum O41.80    HTN in pregnancy, chronic O10.919    38 weeks gestation of pregnancy Z3A.38    Body mass index (BMI) of 45.0 to 49.9 in adult [facility]) H14.40    Encounter for planned induction of labor Z34.90    Headache R51.9    Headache in pregnancy, postpartum O90.89, R51.9    Postpartum  wound disruption O90.0    Open wound of lower midline pelvic region (C Section incision) S31.109A     Plan: Active Orders   Nursing    WOUND CARE, DRESSING CHANGE     Frequency: ONE TIME     Number of Occurrences: 1 Occurrences     Order Comments: Abdominal Wound:  Cleanse wound with normal saline. Keep cover in shower. Change after shower. Dakin's solution0.125%- apply to wound on gauze or packing strip, Cover with dry gauze, cover dressing. change daily. Change dressing daily   or PRN by Home Health. Apply barrier cream to wound edges. Medications    sodium hypochlorite (HALF STRENGTH DAKIN'S) 0.25% irrigation (bottle) 30 mL     Frequency: ONCE     Dose: 30 mL     Route: Topical       Follow-up Information     Follow up With Specialties Details Why Contact Info    [address] In 1 week  [facility] [address] Kaycee Clark [address]        There has been improvement. We will ask her to try Dakin's solution daily to see if this reduces her pain. She may be experiencing some pain from the silver in the alginate. I will recheck her in 1 week. Her bowel movements remain stable for her and her bladder function is normal.    Dictated using voice recognition software.  Proofread, but unrecognized errors may exist.       Thank you very much for the opportunity to participate in this patient's care.       Signed By: Denisse Su MD     November 10, 2020

## 2020-11-10 NOTE — WOUND CARE
11/10/20 9814 Wound Abdomen Lower;Mid Date First Assessed/Time First Assessed: 09/24/20 0908   Present on Hospital Admission: Yes  Primary Wound Type: Open incision/surgical site  Location: Abdomen  Wound Location Orientation: Lower;Mid Wound Image Dressing Status Clean;Dry; Intact Dressing/Treatment  
(alginate with silver, bordered foam) Wound Length (cm) 1 cm Wound Width (cm) 3 cm Wound Depth (cm) 0.5 cm Wound Surface Area (cm^2) 3 cm^2 Change in Wound Size % -20 Wound Volume (cm^3) 1.5 cm^3 Wound Healing % 80 Drainage Amount Small Drainage Description Serosanguinous Wound Odor None Alysha-Wound/Incision Assessment Intact Wound Thickness Description Full thickness

## 2020-11-10 NOTE — DISCHARGE INSTRUCTIONS
[address]  [address], [address]  Phone: [phone]  Fax: [phone]    Patient: Alisson Rios MRN: [id number]  SSN: [id number]    YOB: 1990  Age: 27 y.o. Sex: female       Return Appointment: 1 week with Jd Wheeler MD    Instructions:  Abdominal Wound:  Cleanse wound with normal saline. Keep cover in shower. Change after shower. Dakin's solution0.125%- apply to wound on gauze or packing strip, Cover with dry gauze, cover dressing. change daily. Change dressing daily   or PRN by Home Health. Should you experience increased redness, swelling, pain, foul odor, size of wound(s), or have a temperature over 101 degrees please contact the [address] [place] [address] Road at [phone] or if after hours contact your primary care physician or go to the hospital emergency department.     Signed By: Rocio Saab RN     November 10, 2020

## 2020-11-10 NOTE — WOUND CARE
[address] [place] [address], [facility] [address] Phone: [phone] Fax: [phone] Patient: Marquez Francis MRN: [id number]  SSN: [id number] YOB: 1990  Age: 27 y.o. Sex: female Return Appointment: 1 week with Bernardo Ricketts MD 
 
Instructions:  Abdominal Wound: 
Cleanse wound with normal saline. Keep cover in shower. Change after shower. Dakin's solution0.125%- apply to wound on gauze or packing strip, Cover with dry gauze, cover dressing. change daily. Apply barrier cream to wound edges. Change dressing daily   or PRN by Home Health. Should you experience increased redness, swelling, pain, foul odor, size of wound(s), or have a temperature over 101 degrees please contact the [address] [place] [address] Road at [phone] or if after hours contact your primary care physician or go to the hospital emergency department. Signed By: Tejal Parker RN November 10, 2020

## 2020-11-17 ENCOUNTER — HOSPITAL ENCOUNTER (OUTPATIENT)
Dept: WOUND CARE | Age: 30
Discharge: HOME | End: 2020-11-17
Attending: SURGERY
Payer: COMMERCIAL

## 2020-11-17 VITALS — SYSTOLIC BLOOD PRESSURE: 149 MMHG | DIASTOLIC BLOOD PRESSURE: 111 MMHG | TEMPERATURE: 97.9 F | HEART RATE: 96 BPM

## 2020-11-17 DIAGNOSIS — S31.109S OPEN WOUND OF ABDOMEN, SEQUELA: ICD-10-CM

## 2020-11-17 PROCEDURE — 99213 OFFICE O/P EST LOW 20 MIN: CPT

## 2020-11-17 PROCEDURE — 99213 OFFICE O/P EST LOW 20 MIN: CPT | Performed by: PHYSICAL MEDICINE & REHABILITATION

## 2020-11-17 RX ORDER — CEPHALEXIN 250 MG/1
500 CAPSULE ORAL 4 TIMES DAILY
COMMUNITY
End: 2020-11-24

## 2020-11-17 NOTE — PROGRESS NOTES
11/17/20 0910   Wound Abdomen Lower;Mid   Date First Assessed/Time First Assessed: 09/24/20 0908   Present on Hospital Admission: Yes  Primary Wound Type: Open incision/surgical site  Location: Abdomen  Wound Location Orientation: Lower;Mid   Wound Image    Wound Etiology Surgical   Dressing Status Clean;Dry; Intact   Cleansed Cleansed with saline   Dressing/Treatment   (dakins and bordered foam)   Wound Length (cm) 0.4 cm   Wound Width (cm) 0.5 cm   Wound Depth (cm) 0.5 cm   Wound Surface Area (cm^2) 0.2 cm^2   Change in Wound Size % 92   Wound Volume (cm^3) 0.1 cm^3   Wound Healing % 99   Wound Assessment Pink/red   Drainage Amount Small   Drainage Description Serosanguinous   Wound Odor None   Alysha-Wound/Incision Assessment Intact   Wound Thickness Description Full thickness   Patient is not currently on any anticoagulants

## 2020-11-17 NOTE — DISCHARGE INSTRUCTIONS
[address]  [address], [address]  Phone: [phone]  Fax: [phone]    Patient: Marquez Francis MRN: [id number]  SSN: [id number]    YOB: 1990  Age: 27 y.o. Sex: female       Return Appointment: 1 week with Bernardo Ricketts MD    Instructions: Abdominal Wound:  Cleanse wound with normal saline. Keep cover in shower. Change after shower. Dakin's solution0.125%- apply to wound on gauze or packing strip, Cover with dry gauze, cover dressing. change daily. Apply barrier cream to wound edges. Change dressing daily   or PRN by Home Health.       If no more drainage may just use gold bond powder on area to keep dry  Follow up  In 1 week    Should you experience increased redness, swelling, pain, foul odor, size of wound(s), or have a temperature over 101 degrees please contact the [address] [place] [address] Road at [phone] or if after hours contact your primary care physician or go to the hospital emergency department.     Signed By: Suzy Smiley RN     November 17, 2020

## 2020-11-17 NOTE — WOUND CARE
[address] [place] [address] [place], [facility] [address] Phone: [phone] Fax: [phone] Patient: Khai Basurto MRN: [id number]  SSN: [id number] YOB: 1990  Age: 27 y.o. Sex: female Return Appointment: 1 week with Hung Becerra MD 
 
Instructions: Abdominal Wound: 
Cleanse wound with normal saline. Keep cover in shower. Change after shower. Dakin's solution0.125%- apply to wound on gauze or packing strip, Cover with dry gauze, cover dressing. change daily. Apply barrier cream to wound edges. Change dressing daily   or PRN by Home Health.  
 
 
If no more drainage may just use gold bond powder on area to keep dry Follow up  In 1 week Should you experience increased redness, swelling, pain, foul odor, size of wound(s), or have a temperature over 101 degrees please contact the [address] [place] [address] Road at [phone] or if after hours contact your primary care physician or go to the hospital emergency department. Signed By: Gabriella Velasquez RN November 17, 2020

## 2020-11-18 NOTE — PROGRESS NOTES
[address], [address], [address]  [phone]    Progress Notes   Cristine Bueno       MRN: [id number]     : 1990     Age: 27 y.o. Subjective/HPI:   This patient is a 27 y.o. female seen and evaluated at the wound clinic for recheck of her incision dehiscence. She has improved greatly this week. She is on oral cephalexin from her dentist and she has been using daily dakins. No fever or chills. .    Review of Systems  A comprehensive review of systems was negative except for that written in the HPI. Past Medical History:   Diagnosis Date    Abnormal weight gain 10/15/2015    Anxiety     Asthma     Chronic hypertension complicating or reason for care during pregnancy, second trimester 4/15/2020    Depression     Frequent headaches 10/15/2015    Gestational hypertension     Nicotine vapor product user     Postpartum depression     Striae purple 10/15/2015      Past Surgical History:   Procedure Laterality Date    HX  SECTION  2020    HX CHOLECYSTECTOMY  2016    HX WISDOM TEETH EXTRACTION      [age] years old    IR INJ BLOOD PATCH  2020      Allergies   Allergen Reactions    Latex Rash    Augmentin [Amoxicillin-Pot Clavulanate] Rash    Metronidazole Hives and Itching      Social History     Tobacco Use    Smoking status: Current Every Day Smoker     Packs/day: 0.50     Years: 13.00     Pack years: 6.50     Types: Cigarettes    Smokeless tobacco: Never Used   Substance Use Topics    Alcohol use: Not Currently     Alcohol/week: 0.0 standard drinks     Comment: occ      Social History     Social History Narrative     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 3 dogs--husky, lab, and pit bull-pointer mix.        Family History   Problem Relation Age of Onset    Kidney Disease Mother         stones    Breast Cancer Mother     Diabetes Father     Arthritis-osteo Father    Mera Thyroid Disease Maternal Grandmother     Hypertension Neg Hx       Cannot display prior to admission medications because the patient has not been admitted in this contact. Current Outpatient Medications   Medication Sig    cephALEXin (Keflex) 250 mg capsule Take 500 mg by mouth four (4) times daily.  norgestimate-ethinyl estradioL (Tri-Sprintec, 28,) 0.18/0.215/0.25 mg-35 mcg (28) tab Take 1 Tab by mouth daily.  busPIRone (BUSPAR) 15 mg tablet Take 15 mg by mouth two (2) times a day.  ALPRAZolam (Xanax) 2 mg tablet Take 2 mg by mouth daily as needed for Anxiety.  sertraline (Zoloft) 50 mg tablet Take 50 mg by mouth nightly.  ibuprofen (MOTRIN) 800 mg tablet Take 1 Tab by mouth every six (6) hours as needed for Pain.  fexofenadine (ALLEGRA) 60 mg tablet Take 1 Tab by mouth two (2) times a day.  cetirizine (ZYRTEC) 10 mg tablet Take 1 Tab by mouth daily.  ondansetron (ZOFRAN ODT) 4 mg disintegrating tablet Take 1 Tab by mouth every eight (8) hours as needed for Nausea or Vomiting.  calcium carbonate (TUMS) 200 mg calcium (500 mg) chew Take 1 Tab by mouth daily.  prenatal vits62/FA/om3/dha/epa (PRENATAL GUMMY PO) Take  by mouth.  budesonide-formoterol (SYMBICORT) 160-4.5 mcg/actuation HFAA Take 2 Puffs by inhalation two (2) times a day.  albuterol (PROVENTIL HFA, VENTOLIN HFA, PROAIR HFA) 90 mcg/actuation inhaler Take 2 Puffs by inhalation every six (6) hours as needed for Wheezing.     sertraline (ZOLOFT) 100 mg tablet TAKE 1 TABLET BY MOUTH EVERY DAY     Current Facility-Administered Medications   Medication Dose Route Frequency    sodium hypochlorite (HALF STRENGTH DAKIN'S) 0.25% irrigation (bottle)   Topical DAILY     Objective:     Vitals:    11/17/20 0908   BP: (!) 149/111   Pulse: 96   Temp: 97.9 °F (36.6 °C)       Physical Exam:  BMI: high  Ambulation: ambulatory  Gen- the patient is well developed and in no acute distress  HEENT- no focal abnormalities of the scalp or face.  Neck- no JVD or retractions  Lungs- normal respiratory effort. Abd- soft and no masses evident. Skin- no jaundice or rashes. Please see the specific measurements and descriptions of the wound below. There is no evidence of periwound induration or warmth. No purulence or odor. Neuro- alert and oriented x 3. No gross imotor deficits are present. Psychological: Affect normal. Mood normal. Memory intact. Wound Abdomen (Active)   Number of days: 81       Wound Abdomen Lower;Mid (Active)   Wound Image   11/17/20 0910   Wound Etiology Surgical 11/17/20 0910   Dressing Status Clean;Dry; Intact 11/17/20 0910   Cleansed Cleansed with saline 11/17/20 0910   Wound Length (cm) 0.4 cm 11/17/20 0910   Wound Width (cm) 0.5 cm 11/17/20 0910   Wound Depth (cm) 0.5 cm 11/17/20 0910   Wound Surface Area (cm^2) 0.2 cm^2 11/17/20 0910   Change in Wound Size % 92 11/17/20 0910   Wound Volume (cm^3) 0.1 cm^3 11/17/20 0910   Wound Healing % 99 11/17/20 0910   Wound Assessment Pink/red 11/17/20 0910   Drainage Amount Small 11/17/20 0910   Drainage Description Serosanguinous 11/17/20 0910   Wound Odor None 11/17/20 0910   Alysha-Wound/Incision Assessment Intact 11/17/20 0910   Wound Thickness Description Full thickness 11/17/20 0910   Read-Only, Retired. Condition of Base Granulation 11/03/20 0819   Read-Only, Retired. Condition of Edges Open 10/13/20 0818   Read-Only, Retired. Tissue Type Percent Red 100 11/03/20 0819   Read-Only, Retired.  Cleansing and Cleansing Agents Normal saline 11/03/20 0819   Number of days: 55            Data Review     All Micro Results     None        All Micro Results     None        Radiology  Assessment:     Patient Active Problem List   Diagnosis Code    Mental disorder affecting pregnancy in third trimester O99.343    Medication exposure during first trimester of pregnancy O09.891    High-risk pregnancy in third trimester O09.93    Obesity affecting pregnancy in third trimester O99.213    Disease of respiratory system affecting pregnancy in second trimester O99.512    Rubella non-immune status, antepartum O99.891, Z28.3    Chronic hypertension complicating or reason for care during pregnancy, third trimester O10.913    Maternal tobacco use, antepartum O99.330    Constipation during pregnancy in third trimester O99.613, K59.00    SGA (small for gestational age), fetal, affecting care of mother, antepartum O41.80    HTN in pregnancy, chronic O10.919    38 weeks gestation of pregnancy Z3A.38    Body mass index (BMI) of 45.0 to 49.9 in adult [facility]) V86.54    Encounter for planned induction of labor Z34.90    Headache R51.9    Headache in pregnancy, postpartum O90.89, R51.9    Postpartum  wound disruption O90.0    Open wound of lower midline pelvic region (C Section incision) S31.109A     Plan: Active Orders   Nursing    WOUND CARE, DRESSING CHANGE     Frequency: ONE TIME     Number of Occurrences: 1 Occurrences     Order Comments: Abdominal Wound:  Cleanse wound with normal saline. Keep cover in shower. Change after shower. Dakin's solution0.125%- apply to wound on gauze or packing strip, Cover with dry gauze, cover dressing. change daily. Apply barrier cream to wound edges. Change dressing daily   or PRN by Home Health.       If no more drainage may just use gold bond powder on area to keep dry  Follow up  In 1 week     Medications    sodium hypochlorite (HALF STRENGTH DAKIN'S) 0.25% irrigation (bottle)     Frequency: DAILY     Route: Topical       Follow-up Information     Follow up With Specialties Details Why Contact Info    [address] In 1 week For wound re-check [address] [facility] [id number] [phone]        She has benefited from daily dakins and had a great improvement in her wound bed. Continue with daily and recheck in one week. I anticipate she will be healed in 1-2 weeks.     Dictated using voice recognition software. Proofread, but unrecognized errors may exist.       Thank you very much for the opportunity to participate in this patient's care.       Signed By: Marcio Dawson MD     November 18, 2020

## 2020-11-22 ENCOUNTER — HOSPITAL ENCOUNTER (EMERGENCY)
Age: 30
Discharge: HOME | End: 2020-11-22
Attending: EMERGENCY MEDICINE
Payer: COMMERCIAL

## 2020-11-22 VITALS
HEIGHT: 67 IN | HEART RATE: 104 BPM | WEIGHT: 293 LBS | DIASTOLIC BLOOD PRESSURE: 81 MMHG | SYSTOLIC BLOOD PRESSURE: 142 MMHG | BODY MASS INDEX: 45.99 KG/M2 | TEMPERATURE: 98.3 F | OXYGEN SATURATION: 99 % | RESPIRATION RATE: 18 BRPM

## 2020-11-22 DIAGNOSIS — T78.40XA ALLERGIC REACTION, INITIAL ENCOUNTER: Primary | ICD-10-CM

## 2020-11-22 PROCEDURE — 99282 EMERGENCY DEPT VISIT SF MDM: CPT

## 2020-11-22 PROCEDURE — 96372 THER/PROPH/DIAG INJ SC/IM: CPT

## 2020-11-22 PROCEDURE — 74011250636 HC RX REV CODE- 250/636: Performed by: EMERGENCY MEDICINE

## 2020-11-22 RX ORDER — DIPHENHYDRAMINE HYDROCHLORIDE 50 MG/ML
25 INJECTION, SOLUTION INTRAMUSCULAR; INTRAVENOUS ONCE
Status: COMPLETED | OUTPATIENT
Start: 2020-11-22 | End: 2020-11-22

## 2020-11-22 RX ORDER — HYDROXYZINE 50 MG/1
50 TABLET, FILM COATED ORAL
Qty: 15 TAB | Refills: 0 | Status: SHIPPED | OUTPATIENT
Start: 2020-11-22 | End: 2020-11-27

## 2020-11-22 RX ADMIN — DIPHENHYDRAMINE HYDROCHLORIDE 25 MG: 50 INJECTION, SOLUTION INTRAMUSCULAR; INTRAVENOUS at 22:24

## 2020-11-23 NOTE — DISCHARGE INSTRUCTIONS
Patient Education        Allergic Reaction: Care Instructions  Your Care Instructions     An allergic reaction is an excessive response from your immune system to a medicine, chemical, food, insect bite, or other substance. A reaction can range from mild to life-threatening. Some people have a mild rash, hives, and itching or stomach cramps. In severe reactions, swelling of your tongue and throat can close up your airway so that you cannot breathe. Follow-up care is a key part of your treatment and safety. Be sure to make and go to all appointments, and call your doctor if you are having problems. It's also a good idea to know your test results and keep a list of the medicines you take. How can you care for yourself at home? · If you know what caused your allergic reaction, be sure to avoid it. Your allergy may become more severe each time you have a reaction. · Take an over-the-counter antihistamine, such as cetirizine (Zyrtec) or loratadine (Claritin), to treat mild symptoms. Read and follow directions on the label. Some antihistamines can make you feel sleepy. Do not give antihistamines to a child unless you have checked with your doctor first. Mild symptoms include sneezing or an itchy or runny nose; an itchy mouth; a few hives or mild itching; and mild nausea or stomach discomfort. · Do not scratch hives or a rash. Put a cold, moist towel on them or take cool baths to relieve itching. Put ice packs on hives, swelling, or insect stings for 10 to 15 minutes at a time. Put a thin cloth between the ice pack and your skin. Do not take hot baths or showers. They will make the itching worse. · Your doctor may prescribe a shot of epinephrine to carry with you in case you have a severe reaction. Learn how to give yourself the shot and keep it with you at all times. Make sure it is not .   · Go to the emergency room every time you have a severe reaction, even if you have used your shot of epinephrine and are feeling better. Symptoms can come back after a shot. · Wear medical alert jewelry that lists your allergies. You can buy this at most drugstores. · If your child has a severe allergy, make sure that his or her teachers, babysitters, coaches, and other caregivers know about the allergy. They should have an epinephrine shot, know how and when to give it, and have a plan to take your child to the hospital.  When should you call for help? Give an epinephrine shot if:    · You think you are having a severe allergic reaction.     · You have symptoms in more than one body area, such as mild nausea and an itchy mouth. After giving an epinephrine shot call 911, even if you feel better. Call 911 if:    · You have symptoms of a severe allergic reaction. These may include:  ? Sudden raised, red areas (hives) all over your body. ? Swelling of the throat, mouth, lips, or tongue. ? Trouble breathing. ? Passing out (losing consciousness). Or you may feel very lightheaded or suddenly feel weak, confused, or restless.     · You have been given an epinephrine shot, even if you feel better. Call your doctor now or seek immediate medical care if:    · You have symptoms of an allergic reaction, such as:  ? A rash or hives (raised, red areas on the skin). ? Itching. ? Swelling. ? Belly pain, nausea, or vomiting. Watch closely for changes in your health, and be sure to contact your doctor if:    · You do not get better as expected. Where can you learn more? Go to http://www.gray.com/  Enter P264 in the search box to learn more about \"Allergic Reaction: Care Instructions. \"  Current as of: June 29, 2020               Content Version: 12.6  © 7057-2997 [employer]. Care instructions adapted under license by [employer] (which disclaims liability or warranty for this information).  If you have questions about a medical condition or this instruction, always ask your healthcare professional. [address] any warranty or liability for your use of this information.

## 2020-11-23 NOTE — ED PROVIDER NOTES
80-year-old white female presents with itching rash. She states she was at a bonfire yesterday evening when she felt something bite her on her right calf. Developed a small bump there. This morning when she awoke she developed a diffuse itching rash on her extremities and torso. She presented to urgent care where she was treated with a shot of steroids and placed on prednisone and Pepcid. Patient states that she has been taking Benadryl but she is still itching and the rash is spreading. No shortness of breath or vomiting. The history is provided by the patient.    Rash           Past Medical History:   Diagnosis Date    Abnormal weight gain 10/15/2015    Anxiety     Asthma     Chronic hypertension complicating or reason for care during pregnancy, second trimester 4/15/2020    Depression     Frequent headaches 10/15/2015    Gestational hypertension     Nicotine vapor product user     Postpartum depression     Striae purple 10/15/2015       Past Surgical History:   Procedure Laterality Date    HX  SECTION  2020    HX CHOLECYSTECTOMY  2016    HX WISDOM TEETH EXTRACTION      16-14 years old    IR INJ BLOOD PATCH  2020         Family History:   Problem Relation Age of Onset    Kidney Disease Mother         stones    Breast Cancer Mother     Diabetes Father     Arthritis-osteo Father     Thyroid Disease Maternal Grandmother     Hypertension Neg Hx        Social History     Socioeconomic History    Marital status:      Spouse name: Not on file    Number of children: Not on file    Years of education: Not on file    Highest education level: Not on file   Occupational History    Not on file   Social Needs    Financial resource strain: Not on file    Food insecurity     Worry: Not on file     Inability: Not on file    Transportation needs     Medical: Not on file     Non-medical: Not on file   Tobacco Use    Smoking status: Current Every Day Smoker     Packs/day: 0.50     Years: 13.00     Pack years: 6.50     Types: Cigarettes    Smokeless tobacco: Never Used   Substance and Sexual Activity    Alcohol use: Not Currently     Alcohol/week: 0.0 standard drinks     Comment: occ    Drug use: Never    Sexual activity: Not Currently     Partners: Male     Birth control/protection: None   Lifestyle    Physical activity     Days per week: Not on file     Minutes per session: Not on file    Stress: Not on file   Relationships    Social connections     Talks on phone: Not on file     Gets together: Not on file     Attends Orthodox service: Not on file     Active member of club or organization: Not on file     Attends meetings of clubs or organizations: Not on file     Relationship status: Not on file    Intimate partner violence     Fear of current or ex partner: Not on file     Emotionally abused: Not on file     Physically abused: Not on file     Forced sexual activity: Not on file   Other Topics Concern     Service Not Asked    Blood Transfusions Not Asked    Caffeine Concern Not Asked    Occupational Exposure Not Asked   [place] Hazards Not Asked    Sleep Concern Not Asked    Stress Concern Not Asked    Weight Concern Not Asked    Special Diet Not Asked    Back Care Not Asked    Exercise Not Asked    Bike Helmet Not Asked   [address] [facility] Not Asked    Self-Exams Not Asked   Social History Narrative     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 3 dogs--husky, lab, and pit bull-pointer mix. ALLERGIES: Latex; Augmentin [amoxicillin-pot clavulanate]; and Metronidazole    Review of Systems   Constitutional: Negative for fever. Respiratory: Negative for shortness of breath. Gastrointestinal: Negative for vomiting. Skin: Positive for rash. Neurological: Negative for headaches.        Vitals:    11/22/20 2101 11/22/20 2149   BP: (!) 142/81    Pulse: (!) 104    Resp: 18    Temp: 98.3 °F (36.8 °C)    SpO2: 99% 99% Weight: 142.9 kg (315 lb)    Height: 5' 7\" (1.702 m)             Physical Exam  Vitals signs and nursing note reviewed. Constitutional:       General: She is not in acute distress. Appearance: Normal appearance. She is not toxic-appearing. HENT:      Head: Normocephalic and atraumatic. Nose: Nose normal.      Mouth/Throat:      Mouth: Mucous membranes are moist.      Pharynx: Oropharynx is clear. Comments: No angioedema  Eyes:      Conjunctiva/sclera: Conjunctivae normal.      Pupils: Pupils are equal, round, and reactive to light. Neck:      Musculoskeletal: Normal range of motion and neck supple. Comments: No stridor  Cardiovascular:      Rate and Rhythm: Normal rate. Pulmonary:      Effort: Pulmonary effort is normal.      Breath sounds: Normal breath sounds. No wheezing. Musculoskeletal: Normal range of motion. Skin:     General: Skin is warm and dry. Comments: Scattered hives on extremities abdomen and back   Neurological:      Mental Status: She is alert and oriented to person, place, and time. Psychiatric:         Mood and Affect: Mood normal.         Behavior: Behavior normal.          MDM  Number of Diagnoses or Management Options  Diagnosis management comments: Patient is being appropriately treated for an allergic reaction. No findings to suggest anaphylaxis. Will switch itching control to Atarax. Patient is advised to use Aveeno oatmeal baths.        Amount and/or Complexity of Data Reviewed  Tests in the medicine section of CPT®: ordered and reviewed    Risk of Complications, Morbidity, and/or Mortality  Presenting problems: low  Diagnostic procedures: low  Management options: low           Procedures

## 2020-11-23 NOTE — ED TRIAGE NOTES
Patient says that an animal bites her and since that she has rash. Patient say that she has been taken benadryl and went to the urgent care today and still the medication is not working.  Patient was masked on arrival.

## 2020-11-24 ENCOUNTER — HOSPITAL ENCOUNTER (OUTPATIENT)
Dept: WOUND CARE | Age: 30
Discharge: HOME | End: 2020-11-24
Attending: SURGERY
Payer: COMMERCIAL

## 2020-11-24 VITALS
WEIGHT: 293 LBS | RESPIRATION RATE: 18 BRPM | DIASTOLIC BLOOD PRESSURE: 88 MMHG | BODY MASS INDEX: 45.99 KG/M2 | HEART RATE: 104 BPM | OXYGEN SATURATION: 99 % | SYSTOLIC BLOOD PRESSURE: 165 MMHG | HEIGHT: 67 IN | TEMPERATURE: 96.5 F

## 2020-11-24 DIAGNOSIS — S31.109S OPEN WOUND OF ABDOMEN, SEQUELA: ICD-10-CM

## 2020-11-24 PROCEDURE — 99213 OFFICE O/P EST LOW 20 MIN: CPT | Performed by: PHYSICAL MEDICINE & REHABILITATION

## 2020-11-24 PROCEDURE — 99213 OFFICE O/P EST LOW 20 MIN: CPT

## 2020-11-24 RX ORDER — METHYLPREDNISOLONE 4 MG/1
4 TABLET ORAL
COMMUNITY
End: 2021-08-11

## 2020-11-24 NOTE — WOUND CARE
11/24/20 0818   Wound Abdomen Lower;Mid   Date First Assessed/Time First Assessed: 09/24/20 0908   Present on Hospital Admission: Yes  Primary Wound Type: Open incision/surgical site  Location: Abdomen  Wound Location Orientation: Lower;Mid   Wound Image    Wound Etiology Surgical   Dressing Status Clean;Dry; Intact   Cleansed Cleansed with saline   Wound Length (cm) 0.2 cm   Wound Width (cm) 0.6 cm   Wound Depth (cm) 0.1 cm   Wound Surface Area (cm^2) 0.12 cm^2   Change in Wound Size % 95.2   Wound Volume (cm^3) 0.01 cm^3   Wound Healing % 100   Wound Assessment Pink/red   Drainage Amount None   Wound Odor None   Alysha-Wound/Incision Assessment Intact   Wound Thickness Description Full thickness

## 2020-11-24 NOTE — DISCHARGE INSTRUCTIONS
[address]  [address], [address]  Phone: [phone]  Fax: [phone]    Patient: Cristine Bueno MRN: [id number]  SSN: [id number]    YOB: 1990  Age: 27 y.o. Sex: female       Return Appointment: 2 weeks with Elkin Arias MD    Instructions: Abdominal Wound:  Apply barrier cream to Open Area-Apply Gauze Over Area. Make Sure to Dry Area Completely After [address]-No Further Skilled Need     Should you experience increased redness, swelling, pain, foul odor, size of wound(s), or have a temperature over 101 degrees please contact the [address] [place] [address] Road at [phone] or if after hours contact your primary care physician or go to the hospital emergency department.     Signed By: Liza Epstein RN     November 24, 2020

## 2020-11-24 NOTE — PROGRESS NOTES
[address], [address], [address] [facility] [address]  [phone]    Progress Notes   Marquez Francis       MRN: [id number]     : 1990     Age: 27 y.o. Subjective/HPI:   This patient is a 27 y.o. female seen and evaluated at the wound clinic for recheck of her open abdominal wound secondary to  section dehiscence. She has had no fever or chills. The wound is so small that her  can no longer pack it. She is having no pain in the abdominal region. .    Review of Systems  A comprehensive review of systems was negative except for that written in the HPI. Past Medical History:   Diagnosis Date    Abnormal weight gain 10/15/2015    Anxiety     Asthma     Chronic hypertension complicating or reason for care during pregnancy, second trimester 4/15/2020    Depression     Frequent headaches 10/15/2015    Gestational hypertension     Nicotine vapor product user     Postpartum depression     Striae purple 10/15/2015      Past Surgical History:   Procedure Laterality Date    HX  SECTION  2020    HX CHOLECYSTECTOMY  2016    HX WISDOM TEETH EXTRACTION      [age] years old    IR INJ BLOOD PATCH  2020      Allergies   Allergen Reactions    Latex Rash    Augmentin [Amoxicillin-Pot Clavulanate] Rash    Metronidazole Hives and Itching      Social History     Tobacco Use    Smoking status: Current Every Day Smoker     Packs/day: 0.50     Years: 13.00     Pack years: 6.50     Types: Cigarettes    Smokeless tobacco: Never Used   Substance Use Topics    Alcohol use: Not Currently     Alcohol/week: 0.0 standard drinks     Comment: occ      Social History     Social History Narrative     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 3 dogs--husky, lab, and pit bull-pointer mix.        Family History   Problem Relation Age of Onset    Kidney Disease Mother         stones    Breast Cancer Mother    [facility] Diabetes Father     Arthritis-osteo Father     Thyroid Disease Maternal Grandmother     Hypertension Neg Hx       Cannot display prior to admission medications because the patient has not been admitted in this contact. Current Outpatient Medications   Medication Sig    methylPREDNISolone (Medrol, Jose,) 4 mg tablet Take 4 mg by mouth Specific Days and Specific Times.  hydrOXYzine HCL (ATARAX) 50 mg tablet Take 1 Tab by mouth three (3) times daily as needed for Itching for up to 5 days.  norgestimate-ethinyl estradioL (Tri-Sprintec, 28,) 0.18/0.215/0.25 mg-35 mcg (28) tab Take 1 Tab by mouth daily.  busPIRone (BUSPAR) 15 mg tablet Take 15 mg by mouth two (2) times a day.  ALPRAZolam (Xanax) 2 mg tablet Take 2 mg by mouth daily as needed for Anxiety.  sertraline (Zoloft) 50 mg tablet Take 50 mg by mouth nightly.  ibuprofen (MOTRIN) 800 mg tablet Take 1 Tab by mouth every six (6) hours as needed for Pain.  fexofenadine (ALLEGRA) 60 mg tablet Take 1 Tab by mouth two (2) times a day.  cetirizine (ZYRTEC) 10 mg tablet Take 1 Tab by mouth daily.  ondansetron (ZOFRAN ODT) 4 mg disintegrating tablet Take 1 Tab by mouth every eight (8) hours as needed for Nausea or Vomiting.  calcium carbonate (TUMS) 200 mg calcium (500 mg) chew Take 1 Tab by mouth daily.  prenatal vits62/FA/om3/dha/epa (PRENATAL GUMMY PO) Take  by mouth.  budesonide-formoterol (SYMBICORT) 160-4.5 mcg/actuation HFAA Take 2 Puffs by inhalation two (2) times a day.  albuterol (PROVENTIL HFA, VENTOLIN HFA, PROAIR HFA) 90 mcg/actuation inhaler Take 2 Puffs by inhalation every six (6) hours as needed for Wheezing.  sertraline (ZOLOFT) 100 mg tablet TAKE 1 TABLET BY MOUTH EVERY DAY     No current facility-administered medications for this encounter.       Objective:     Vitals:    11/24/20 0815   BP: (!) 165/88   Pulse: (!) 104   Resp: 18   Temp: (!) 96.5 °F (35.8 °C)   SpO2: 99%   Weight: 318 lb 12.8 oz (144.6 kg) Height: 5' 7\" (1.702 m)       Physical Exam:  BMI: Very high BMI  Ambulation: Ambulatory  Gen- the patient is well developed and in no acute distress  HEENT- no focal abnormalities of the scalp or face. Neck- no JVD or retractions  Lungs- normal respiratory effort. Abd- soft and no masses evident. Skin- no jaundice or rashes. Please see the specific measurements and descriptions of the wound below. There is no evidence of periwound induration or warmth. No purulence or odor. Psychological: Affect normal. Mood normal. Memory intact. Wound Abdomen (Active)   Number of days: 87       Wound Abdomen Lower;Mid (Active)   Wound Image   11/24/20 0818   Wound Etiology Surgical 11/24/20 0818   Dressing Status Clean;Dry; Intact 11/24/20 0818   Cleansed Cleansed with saline 11/24/20 0818   Wound Length (cm) 0.2 cm 11/24/20 0818   Wound Width (cm) 0.6 cm 11/24/20 0818   Wound Depth (cm) 0.1 cm 11/24/20 0818   Wound Surface Area (cm^2) 0.12 cm^2 11/24/20 0818   Change in Wound Size % 95.2 11/24/20 0818   Wound Volume (cm^3) 0.01 cm^3 11/24/20 0818   Wound Healing % 100 11/24/20 0818   Wound Assessment Pink/red 11/24/20 0818   Drainage Amount None 11/24/20 0818   Drainage Description Serosanguinous 11/17/20 0910   Wound Odor None 11/24/20 0818   Alysha-Wound/Incision Assessment Intact 11/24/20 0818   Wound Thickness Description Full thickness 11/24/20 0818   Read-Only, Retired. Condition of Base Granulation 11/03/20 0819   Read-Only, Retired. Condition of Edges Open 10/13/20 0818   Read-Only, Retired. Tissue Type Percent Red 100 11/03/20 0819   Read-Only, Retired.  Cleansing and Cleansing Agents Normal saline 11/03/20 0819   Number of days: 61        Data Review     All Micro Results     None        All Micro Results     None        Radiology  Assessment:     Patient Active Problem List   Diagnosis Code    Mental disorder affecting pregnancy in third trimester O99.343    Medication exposure during first trimester of pregnancy O09.891    High-risk pregnancy in third trimester O09.93    Obesity affecting pregnancy in third trimester O99.213    Disease of respiratory system affecting pregnancy in second trimester O99.512    Rubella non-immune status, antepartum O99.891, Z28.3    Chronic hypertension complicating or reason for care during pregnancy, third trimester O10.913    Maternal tobacco use, antepartum O99.330    Constipation during pregnancy in third trimester O99.613, K59.00    SGA (small for gestational age), fetal, affecting care of mother, antepartum O41.80    HTN in pregnancy, chronic O10.919    38 weeks gestation of pregnancy Z3A.38    Body mass index (BMI) of 45.0 to 49.9 in adult [facility]) R03.49    Encounter for planned induction of labor Z34.90    Headache R51.9    Headache in pregnancy, postpartum O90.89, R51.9    Postpartum  wound disruption O90.0    Open wound of lower midline pelvic region (C Section incision) S31.109A     Plan: Active Orders   Nursing    WOUND CARE, DRESSING CHANGE     Frequency: ONE TIME     Number of Occurrences: 1 Occurrences     Order Comments: Abdominal Wound:  Apply barrier cream to Open Area-Apply Gauze Over Area. Make Sure to Dry Area Completely After [address] [facility]-No Further Skilled Need          Follow-up Information     Follow up With Specialties Details Why Contact Info    [address] In 2 weeks For wound re-check [address] [facility] [address] [phone]        She has almost fully healed. Her wound can no longer be packed. We will apply a barrier ointment to the open area and place gauze on it. If they do not see drainage then she can discontinue completely. She is to shower ad yohan. and can dry the skin completely after showering. We will recheck her in 2 weeks if it is still open. Dictated using voice recognition software.  Proofread, but unrecognized errors may exist.       Thank you very much for the opportunity to participate in this patient's care.       Signed By: Mary Rojas MD     November 24, 2020

## 2020-11-24 NOTE — WOUND CARE
[address]  [address], [address]  Phone: [phone]  Fax: [phone]    Patient: Alberto Garcia MRN: [id number]  SSN: [id number]    YOB: 1990  Age: 27 y.o. Sex: female       Return Appointment: 2 weeks with Mark Rojas MD    Instructions: Abdominal Wound:  Apply barrier cream to Open Area-Apply Gauze Over Area. Make Sure to Dry Area Completely After 15[address] [facility]-No Further Skilled Need     Should you experience increased redness, swelling, pain, foul odor, size of wound(s), or have a temperature over 101 degrees please contact the [address] [place] [address] Road at [phone] or if after hours contact your primary care physician or go to the hospital emergency department.     Signed By: Erica Oakes RN     November 24, 2020

## 2020-12-08 ENCOUNTER — HOSPITAL ENCOUNTER (OUTPATIENT)
Dept: WOUND CARE | Age: 30
Discharge: HOME | End: 2020-12-08
Attending: SURGERY
Payer: COMMERCIAL

## 2020-12-08 VITALS
BODY MASS INDEX: 45.99 KG/M2 | HEIGHT: 67 IN | DIASTOLIC BLOOD PRESSURE: 93 MMHG | TEMPERATURE: 98.1 F | SYSTOLIC BLOOD PRESSURE: 131 MMHG | HEART RATE: 97 BPM | WEIGHT: 293 LBS

## 2020-12-08 DIAGNOSIS — S31.109S OPEN WOUND OF ABDOMEN, SEQUELA: ICD-10-CM

## 2020-12-08 DIAGNOSIS — O99.213 OBESITY AFFECTING PREGNANCY IN THIRD TRIMESTER: ICD-10-CM

## 2020-12-08 PROCEDURE — 99212 OFFICE O/P EST SF 10 MIN: CPT | Performed by: PHYSICAL MEDICINE & REHABILITATION

## 2020-12-08 PROCEDURE — 99212 OFFICE O/P EST SF 10 MIN: CPT

## 2020-12-08 NOTE — PROGRESS NOTES
[address], [address], [address] [facility] [address]  [phone]    Progress Notes   Karla Dmaon       MRN: [id number]     : 1990     Age: 27 y.o. Subjective/HPI:   This patient is a 27 y.o. female seen and evaluated at the wound clinic for check of her abdominal open wound secondary to incision dehiscence from  section. The patient has had no drainage, no fever, and no chills. .    Review of Systems  A comprehensive review of systems was negative except for that written in the HPI. Past Medical History:   Diagnosis Date    Abnormal weight gain 10/15/2015    Anxiety     Asthma     Chronic hypertension complicating or reason for care during pregnancy, second trimester 4/15/2020    Depression     Frequent headaches 10/15/2015    Gestational hypertension     Nicotine vapor product user     Postpartum depression     Striae purple 10/15/2015      Past Surgical History:   Procedure Laterality Date    HX  SECTION  2020    HX CHOLECYSTECTOMY  2016    HX WISDOM TEETH EXTRACTION      [age] years old    IR INJ BLOOD PATCH  2020      Allergies   Allergen Reactions    Latex Rash    Augmentin [Amoxicillin-Pot Clavulanate] Rash    Metronidazole Hives and Itching      Social History     Tobacco Use    Smoking status: Current Every Day Smoker     Packs/day: 0.50     Years: 13.00     Pack years: 6.50     Types: Cigarettes    Smokeless tobacco: Never Used   Substance Use Topics    Alcohol use: Not Currently     Alcohol/week: 0.0 standard drinks     Comment: occ      Social History     Social History Narrative     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 3 dogs--husky, lab, and pit bull-pointer mix.        Family History   Problem Relation Age of Onset    Kidney Disease Mother         stones    Breast Cancer Mother     Diabetes Father     Arthritis-osteo Father     Thyroid Disease Maternal Grandmother     Hypertension Neg Hx       Cannot display prior to admission medications because the patient has not been admitted in this contact. Current Outpatient Medications   Medication Sig    methylPREDNISolone (Medrol, Jose,) 4 mg tablet Take 4 mg by mouth Specific Days and Specific Times.  norgestimate-ethinyl estradioL (Tri-Sprintec, 28,) 0.18/0.215/0.25 mg-35 mcg (28) tab Take 1 Tab by mouth daily.  busPIRone (BUSPAR) 15 mg tablet Take 15 mg by mouth two (2) times a day.  ALPRAZolam (Xanax) 2 mg tablet Take 2 mg by mouth daily as needed for Anxiety.  sertraline (Zoloft) 50 mg tablet Take 50 mg by mouth nightly.  ibuprofen (MOTRIN) 800 mg tablet Take 1 Tab by mouth every six (6) hours as needed for Pain.  fexofenadine (ALLEGRA) 60 mg tablet Take 1 Tab by mouth two (2) times a day.  cetirizine (ZYRTEC) 10 mg tablet Take 1 Tab by mouth daily.  ondansetron (ZOFRAN ODT) 4 mg disintegrating tablet Take 1 Tab by mouth every eight (8) hours as needed for Nausea or Vomiting.  calcium carbonate (TUMS) 200 mg calcium (500 mg) chew Take 1 Tab by mouth daily.  prenatal vits62/FA/om3/dha/epa (PRENATAL GUMMY PO) Take  by mouth.  budesonide-formoterol (SYMBICORT) 160-4.5 mcg/actuation HFAA Take 2 Puffs by inhalation two (2) times a day.  albuterol (PROVENTIL HFA, VENTOLIN HFA, PROAIR HFA) 90 mcg/actuation inhaler Take 2 Puffs by inhalation every six (6) hours as needed for Wheezing.  sertraline (ZOLOFT) 100 mg tablet TAKE 1 TABLET BY MOUTH EVERY DAY     No current facility-administered medications for this encounter. Objective:     Vitals:    12/08/20 0812   BP: (!) 131/93   Pulse: 97   Temp: 98.1 °F (36.7 °C)   Weight: 321 lb (145.6 kg)   Height: 5' 7\" (1.702 m)       Physical Exam:  BMI: Very high BMI  Ambulation: Ambulatory  Gen- the patient is well developed and in no acute distress  Lungs- normal respiratory effort. Abd- soft and no masses evident.   Skin- no jaundice or rashes. Please see the specific measurements and descriptions of the wound below. There is no evidence of periwound induration or warmth. No purulence or odor. No drainage and the wound appears healed. Neuro- alert and oriented x 3. No gross imotor deficits are present. Psychological: Affect normal. Mood normal. Memory intact. [REMOVED] Wound Abdomen (Removed)   Number of days: 101       [REMOVED] Wound Abdomen Lower;Mid (Removed)   Wound Image   12/08/20 0812   Wound Etiology Surgical 12/08/20 5061   Dressing Status Clean;Dry; Intact 11/24/20 0818   Cleansed Cleansed with saline 11/24/20 0818   Dressing/Treatment Open to air 12/08/20 0812   Wound Length (cm) 0 cm 12/08/20 0812   Wound Width (cm) 0 cm 12/08/20 0812   Wound Depth (cm) 0 cm 12/08/20 0812   Wound Surface Area (cm^2) 0 cm^2 12/08/20 0812   Change in Wound Size % 100 12/08/20 0812   Wound Volume (cm^3) 0 cm^3 12/08/20 0812   Wound Healing % 100 12/08/20 0812   Wound Assessment Pink/red 11/24/20 0818   Drainage Amount None 12/08/20 0812   Drainage Description Serosanguinous 11/17/20 0910   Wound Odor None 12/08/20 0812   Alysha-Wound/Incision Assessment Intact 11/24/20 0818   Wound Thickness Description Full thickness 11/24/20 0818   Read-Only, Retired. Condition of Base Granulation 11/03/20 0819   Read-Only, Retired. Condition of Edges Open 10/13/20 0818   Read-Only, Retired. Tissue Type Percent Red 100 11/03/20 0819   Read-Only, Retired.  Cleansing and Cleansing Agents Normal saline 11/03/20 0819   Number of days: 75        Data Review     All Micro Results     None        All Micro Results     None        Radiology  Assessment:     Patient Active Problem List   Diagnosis Code    Mental disorder affecting pregnancy in third trimester O99.343    Medication exposure during first trimester of pregnancy O09.891    High-risk pregnancy in third trimester O09.93    Obesity affecting pregnancy in third trimester O99.213    Disease of respiratory system affecting pregnancy in second trimester O99.512    Rubella non-immune status, antepartum O99.891, Z28.3    Chronic hypertension complicating or reason for care during pregnancy, third trimester O10.913    Maternal tobacco use, antepartum O99.330    Constipation during pregnancy in third trimester O99.613, K59.00    SGA (small for gestational age), fetal, affecting care of mother, antepartum O41.80    HTN in pregnancy, chronic O10.919    38 weeks gestation of pregnancy Z3A.38    Body mass index (BMI) of 45.0 to 49.9 in adult [facility]) K96.54    Encounter for planned induction of labor Z34.90    Headache R51.9    Headache in pregnancy, postpartum O90.89, R51.9    Postpartum  wound disruption O90.0    Open wound of lower midline pelvic region (C Section incision) S31.109A     Plan: Active Orders   Nursing    WOUND CARE, DRESSING CHANGE     Frequency: ONE TIME     Number of Occurrences: 1 Occurrences     Order Comments: No dressing needed. Discharge from service. Follow-up Information     Follow up With Specialties Details Why Contact Info    [address]  As needed [facility] [address] [address] [phone]        She appears fully healed and ready for discharge from the wound clinic. She will have no restrictions at this point. Dictated using voice recognition software. Proofread, but unrecognized errors may exist.       Thank you very much for the opportunity to participate in this patient's care.       Signed By: Mili Prince MD     2020

## 2020-12-08 NOTE — DISCHARGE INSTRUCTIONS
[address]  [address], [address]  Phone: [phone]  Fax: [phone]    Patient: Saud Rodgers MRN: [id number]  SSN: [id number]    YOB: 1990  Age: 27 y.o. Sex: female       Return Appointment: Discharge with Nahid Hopkins MD    Instructions: No dressing needed. Discharge from service. Should you experience increased redness, swelling, pain, foul odor, size of wound(s), or have a temperature over 101 degrees please contact the [address] [place] [address] Road at [phone] or if after hours contact your primary care physician or go to the hospital emergency department.     Signed By: Cl Sosa RN     December 8, 2020

## 2020-12-08 NOTE — WOUND CARE
12/08/20 7134   Wound Abdomen Lower;Mid   Date First Assessed/Time First Assessed: 09/24/20 0908   Present on Hospital Admission: Yes  Primary Wound Type: Open incision/surgical site  Location: Abdomen  Wound Location Orientation: Lower;Mid   Wound Image    Wound Etiology Surgical   Dressing/Treatment Open to air   Wound Length (cm) 0 cm   Wound Width (cm) 0 cm   Wound Depth (cm) 0 cm   Wound Surface Area (cm^2) 0 cm^2   Change in Wound Size % 100   Wound Volume (cm^3) 0 cm^3   Wound Healing % 100   Drainage Amount None   Wound Odor None

## 2021-07-14 ENCOUNTER — TRANSCRIBE ORDER (OUTPATIENT)
Dept: SCHEDULING | Age: 31
End: 2021-07-14

## 2021-07-14 DIAGNOSIS — N63.20 LEFT BREAST MASS: Primary | ICD-10-CM

## 2021-07-22 ENCOUNTER — HOSPITAL ENCOUNTER (OUTPATIENT)
Dept: MAMMOGRAPHY | Age: 31
Discharge: HOME | End: 2021-07-22
Payer: COMMERCIAL

## 2021-07-22 DIAGNOSIS — N63.20 LEFT BREAST MASS: ICD-10-CM

## 2021-07-22 PROCEDURE — 77062 BREAST TOMOSYNTHESIS BI: CPT

## 2021-07-22 PROCEDURE — 76642 ULTRASOUND BREAST LIMITED: CPT

## 2021-07-23 ENCOUNTER — HOSPITAL ENCOUNTER (OUTPATIENT)
Dept: MAMMOGRAPHY | Age: 31
Discharge: HOME | End: 2021-07-23
Payer: COMMERCIAL

## 2021-07-23 VITALS — DIASTOLIC BLOOD PRESSURE: 68 MMHG | SYSTOLIC BLOOD PRESSURE: 118 MMHG | HEART RATE: 84 BPM

## 2021-07-23 DIAGNOSIS — R92.8 ABNORMAL MAMMOGRAM OF LEFT BREAST: ICD-10-CM

## 2021-07-23 PROCEDURE — A4648 IMPLANTABLE TISSUE MARKER: HCPCS

## 2021-07-23 PROCEDURE — 77065 DX MAMMO INCL CAD UNI: CPT

## 2021-07-23 PROCEDURE — 19084 BX BREAST ADD LESION US IMAG: CPT

## 2021-07-23 PROCEDURE — 77030003481 US BX BREAST LT 1ST LESION W/CLIP AND SPECIMEN

## 2021-07-23 PROCEDURE — 74011000250 HC RX REV CODE- 250

## 2021-07-23 PROCEDURE — 88305 TISSUE EXAM BY PATHOLOGIST: CPT

## 2021-07-23 RX ORDER — LIDOCAINE HYDROCHLORIDE 10 MG/ML
10 INJECTION INFILTRATION; PERINEURAL
Status: COMPLETED | OUTPATIENT
Start: 2021-07-23 | End: 2021-07-23

## 2021-07-23 RX ADMIN — LIDOCAINE HYDROCHLORIDE 20 ML: 10 INJECTION, SOLUTION INFILTRATION; PERINEURAL at 11:44

## 2021-12-17 ENCOUNTER — HOSPITAL ENCOUNTER (OUTPATIENT)
Dept: MAMMOGRAPHY | Age: 31
Discharge: HOME | End: 2021-12-17
Attending: OBSTETRICS & GYNECOLOGY
Payer: COMMERCIAL

## 2021-12-17 DIAGNOSIS — N60.02 CYST OF LEFT BREAST: ICD-10-CM

## 2021-12-17 PROCEDURE — 77065 DX MAMMO INCL CAD UNI: CPT

## 2021-12-17 PROCEDURE — 76642 ULTRASOUND BREAST LIMITED: CPT

## 2022-01-10 NOTE — ANESTHESIA PREPROCEDURE EVALUATION
Relevant Problems   No relevant active problems       Anesthetic History   No history of anesthetic complications            Review of Systems / Medical History  Patient summary reviewed and pertinent labs reviewed    Pulmonary          Smoker  Asthma : well controlled       Neuro/Psych         Headaches and psychiatric history     Cardiovascular    Hypertension              Exercise tolerance[de-identified] Questionable 4 METs - limited by weight      GI/Hepatic/Renal     GERD           Endo/Other        Morbid obesity     Other Findings   Comments: Hb 13.3  Plts 261         Physical Exam    Airway  Mallampati: III  TM Distance: 4 - 6 cm  Neck ROM: normal range of motion   Mouth opening: Normal     Cardiovascular  Regular rate and rhythm,  S1 and S2 normal,  no murmur, click, rub, or gallop             Dental  No notable dental hx       Pulmonary  Breath sounds clear to auscultation               Abdominal  GI exam deferred       Other Findings            Anesthetic Plan    ASA: 3, emergent  Anesthesia type: epidural and spinal            Anesthetic plan and risks discussed with: Patient and Spouse       secondary to failure to progress. Patient still with questionable coverage after redo epidural. Prior to bolusing epidural, decision was made to pursue a spinal anesthetic instead given patient's morbid obesity and significant anxiety. Patient endorsed understanding and all questions/concerns addressed.
No

## 2022-03-18 PROBLEM — O10.919 HTN IN PREGNANCY, CHRONIC: Status: ACTIVE | Noted: 2020-08-27

## 2022-03-18 PROBLEM — R51.9 HEADACHE IN PREGNANCY, POSTPARTUM: Status: ACTIVE | Noted: 2020-09-03

## 2022-03-18 PROBLEM — O99.330 MATERNAL TOBACCO USE, ANTEPARTUM: Status: ACTIVE | Noted: 2020-05-19

## 2022-03-18 PROBLEM — Z34.90 ENCOUNTER FOR PLANNED INDUCTION OF LABOR: Status: ACTIVE | Noted: 2020-08-27

## 2022-03-19 PROBLEM — O36.5990 SGA (SMALL FOR GESTATIONAL AGE), FETAL, AFFECTING CARE OF MOTHER, ANTEPARTUM: Status: ACTIVE | Noted: 2020-08-14

## 2022-03-19 PROBLEM — R51.9 HEADACHE: Status: ACTIVE | Noted: 2020-09-02

## 2022-03-19 PROBLEM — O09.891 MEDICATION EXPOSURE DURING FIRST TRIMESTER OF PREGNANCY: Status: ACTIVE | Noted: 2020-02-25

## 2022-03-19 PROBLEM — O99.512: Status: ACTIVE | Noted: 2020-02-26

## 2022-03-19 PROBLEM — Z28.39 RUBELLA NON-IMMUNE STATUS, ANTEPARTUM: Status: ACTIVE | Noted: 2020-04-15

## 2022-03-19 PROBLEM — O10.913 CHRONIC HYPERTENSION COMPLICATING OR REASON FOR CARE DURING PREGNANCY, THIRD TRIMESTER: Status: ACTIVE | Noted: 2020-04-15

## 2022-03-19 PROBLEM — O09.899 RUBELLA NON-IMMUNE STATUS, ANTEPARTUM: Status: ACTIVE | Noted: 2020-04-15

## 2022-03-19 PROBLEM — Z3A.38 38 WEEKS GESTATION OF PREGNANCY: Status: ACTIVE | Noted: 2020-08-27

## 2022-03-19 PROBLEM — O99.213 OBESITY AFFECTING PREGNANCY IN THIRD TRIMESTER: Status: ACTIVE | Noted: 2020-02-26

## 2022-03-19 PROBLEM — O99.343 MENTAL DISORDER AFFECTING PREGNANCY IN THIRD TRIMESTER: Status: ACTIVE | Noted: 2020-02-25

## 2022-03-20 PROBLEM — K59.00 CONSTIPATION DURING PREGNANCY IN THIRD TRIMESTER: Status: ACTIVE | Noted: 2020-06-17

## 2022-03-20 PROBLEM — O09.93 HIGH-RISK PREGNANCY IN THIRD TRIMESTER: Status: ACTIVE | Noted: 2020-02-26

## 2022-03-20 PROBLEM — S31.109A OPEN WOUND OF ABDOMEN: Status: ACTIVE | Noted: 2020-10-01

## 2022-03-20 PROBLEM — O99.613 CONSTIPATION DURING PREGNANCY IN THIRD TRIMESTER: Status: ACTIVE | Noted: 2020-06-17

## 2022-11-15 ENCOUNTER — APPOINTMENT (OUTPATIENT)
Dept: CT IMAGING | Age: 32
End: 2022-11-15
Payer: COMMERCIAL

## 2022-11-15 ENCOUNTER — HOSPITAL ENCOUNTER (EMERGENCY)
Age: 32
Discharge: HOME | End: 2022-11-15
Attending: EMERGENCY MEDICINE
Payer: COMMERCIAL

## 2022-11-15 VITALS
WEIGHT: 293 LBS | SYSTOLIC BLOOD PRESSURE: 126 MMHG | BODY MASS INDEX: 45.99 KG/M2 | RESPIRATION RATE: 16 BRPM | HEART RATE: 84 BPM | DIASTOLIC BLOOD PRESSURE: 88 MMHG | TEMPERATURE: 98.6 F | OXYGEN SATURATION: 100 % | HEIGHT: 67 IN

## 2022-11-15 DIAGNOSIS — R14.0 BLOATING: ICD-10-CM

## 2022-11-15 DIAGNOSIS — R10.84 GENERALIZED ABDOMINAL PAIN: Primary | ICD-10-CM

## 2022-11-15 LAB
ALBUMIN SERPL-MCNC: 4.1 G/DL (ref 3.5–5)
ALBUMIN/GLOB SERPL: 1.2 {RATIO} (ref 0.4–1.6)
ALP SERPL-CCNC: 122 U/L (ref 45–117)
ALT SERPL-CCNC: 17 U/L (ref 13–61)
ANION GAP SERPL CALC-SCNC: 11 MMOL/L (ref 2–11)
APPEARANCE UR: CLEAR
AST SERPL-CCNC: 21 U/L (ref 15–37)
BASOPHILS # BLD: 0 K/UL (ref 0–0.2)
BASOPHILS NFR BLD: 0 % (ref 0–2)
BILIRUB SERPL-MCNC: 0.2 MG/DL (ref 0.2–1.1)
BILIRUB UR QL: NEGATIVE
BUN SERPL-MCNC: 12 MG/DL (ref 7–18)
CALCIUM SERPL-MCNC: 9.9 MG/DL (ref 8.3–10.4)
CHLORIDE SERPL-SCNC: 101 MMOL/L (ref 98–107)
CO2 SERPL-SCNC: 26 MMOL/L (ref 21–32)
COLOR UR: YELLOW
CREAT SERPL-MCNC: 0.72 MG/DL (ref 0.6–1)
DIFFERENTIAL METHOD BLD: NORMAL
EOSINOPHIL # BLD: 0.2 K/UL (ref 0–0.8)
EOSINOPHIL NFR BLD: 2 % (ref 0.5–7.8)
ERYTHROCYTE [DISTWIDTH] IN BLOOD BY AUTOMATED COUNT: 13 % (ref 11.9–14.6)
GLOBULIN SER CALC-MCNC: 3.4 G/DL (ref 2.8–4.5)
GLUCOSE SERPL-MCNC: 90 MG/DL (ref 65–100)
GLUCOSE UR STRIP.AUTO-MCNC: NEGATIVE MG/DL
HCG UR QL: NEGATIVE
HCT VFR BLD AUTO: 40.2 % (ref 35.8–46.3)
HGB BLD-MCNC: 13.4 G/DL (ref 11.7–15.4)
HGB UR QL STRIP: NEGATIVE
IMM GRANULOCYTES # BLD AUTO: 0 K/UL (ref 0–0.5)
IMM GRANULOCYTES NFR BLD AUTO: 0 % (ref 0–5)
KETONES UR QL STRIP.AUTO: NEGATIVE MG/DL
LEUKOCYTE ESTERASE UR QL STRIP.AUTO: NEGATIVE
LIPASE SERPL-CCNC: 19 U/L (ref 13–60)
LYMPHOCYTES # BLD: 2.6 K/UL (ref 0.5–4.6)
LYMPHOCYTES NFR BLD: 28 % (ref 13–44)
MCH RBC QN AUTO: 28.3 PG (ref 26.1–32.9)
MCHC RBC AUTO-ENTMCNC: 33.3 G/DL (ref 31.4–35)
MCV RBC AUTO: 85 FL (ref 82–102)
MONOCYTES # BLD: 0.8 K/UL (ref 0.1–1.3)
MONOCYTES NFR BLD: 9 % (ref 4–12)
NEUTS SEG # BLD: 5.5 K/UL (ref 1.7–8.2)
NEUTS SEG NFR BLD: 60 % (ref 43–78)
NITRITE UR QL STRIP.AUTO: NEGATIVE
NRBC # BLD: 0 K/UL (ref 0–0.2)
PH UR STRIP: 5.5 [PH] (ref 5–9)
PLATELET # BLD AUTO: 338 K/UL (ref 150–450)
PMV BLD AUTO: 10 FL (ref 9.4–12.3)
POTASSIUM SERPL-SCNC: 4.3 MMOL/L (ref 3.5–5.1)
PROT SERPL-MCNC: 7.5 G/DL (ref 6.4–8.2)
PROT UR STRIP-MCNC: NEGATIVE MG/DL
RBC # BLD AUTO: 4.73 M/UL (ref 4.05–5.2)
SODIUM SERPL-SCNC: 138 MMOL/L (ref 133–143)
SP GR UR REFRACTOMETRY: 1.01 (ref 1–1.02)
UROBILINOGEN UR QL STRIP.AUTO: 0.2 EU/DL (ref 0.2–1)
WBC # BLD AUTO: 9 K/UL (ref 4.3–11.1)

## 2022-11-15 PROCEDURE — 2580000003 HC RX 258: Performed by: PHYSICIAN ASSISTANT

## 2022-11-15 PROCEDURE — 81003 URINALYSIS AUTO W/O SCOPE: CPT

## 2022-11-15 PROCEDURE — 74177 CT ABD & PELVIS W/CONTRAST: CPT

## 2022-11-15 PROCEDURE — 99285 EMERGENCY DEPT VISIT HI MDM: CPT | Performed by: EMERGENCY MEDICINE

## 2022-11-15 PROCEDURE — 83690 ASSAY OF LIPASE: CPT

## 2022-11-15 PROCEDURE — 81025 URINE PREGNANCY TEST: CPT

## 2022-11-15 PROCEDURE — 85025 COMPLETE CBC W/AUTO DIFF WBC: CPT

## 2022-11-15 PROCEDURE — 6360000004 HC RX CONTRAST MEDICATION: Performed by: PHYSICIAN ASSISTANT

## 2022-11-15 PROCEDURE — 80053 COMPREHEN METABOLIC PANEL: CPT

## 2022-11-15 RX ORDER — SODIUM CHLORIDE 0.9 % (FLUSH) 0.9 %
10 SYRINGE (ML) INJECTION
Status: COMPLETED | OUTPATIENT
Start: 2022-11-15 | End: 2022-11-15

## 2022-11-15 RX ORDER — 0.9 % SODIUM CHLORIDE 0.9 %
100 INTRAVENOUS SOLUTION INTRAVENOUS
Status: COMPLETED | OUTPATIENT
Start: 2022-11-15 | End: 2022-11-15

## 2022-11-15 RX ORDER — DICYCLOMINE HCL 20 MG
20 TABLET ORAL 3 TIMES DAILY PRN
Qty: 30 TABLET | Refills: 0 | Status: SHIPPED | OUTPATIENT
Start: 2022-11-15

## 2022-11-15 RX ADMIN — SODIUM CHLORIDE 100 ML: 9 INJECTION, SOLUTION INTRAVENOUS at 21:20

## 2022-11-15 RX ADMIN — SODIUM CHLORIDE, PRESERVATIVE FREE 10 ML: 5 INJECTION INTRAVENOUS at 21:20

## 2022-11-15 RX ADMIN — IOPAMIDOL 100 ML: 755 INJECTION, SOLUTION INTRAVENOUS at 21:19

## 2022-11-15 ASSESSMENT — PAIN SCALES - GENERAL
PAINLEVEL_OUTOF10: 7
PAINLEVEL_OUTOF10: 8
PAINLEVEL_OUTOF10: 5

## 2022-11-15 ASSESSMENT — ENCOUNTER SYMPTOMS
ABDOMINAL DISTENTION: 1
ABDOMINAL PAIN: 1
RESPIRATORY NEGATIVE: 1
CONSTIPATION: 1

## 2022-11-15 ASSESSMENT — PAIN DESCRIPTION - DESCRIPTORS: DESCRIPTORS: CRAMPING

## 2022-11-15 ASSESSMENT — PAIN DESCRIPTION - LOCATION: LOCATION: ABDOMEN

## 2022-11-15 ASSESSMENT — PAIN - FUNCTIONAL ASSESSMENT: PAIN_FUNCTIONAL_ASSESSMENT: 0-10

## 2022-11-16 NOTE — ED PROVIDER NOTES
Emergency Department Provider Note                   PCP:                IRAIDA Morocho NP               Age: 28 y.o. Sex: female       ICD-10-CM    1. Generalized abdominal pain  R10.84       2. Bloating  R14.0           DISPOSITION Decision To Discharge 11/15/2022 10:24:10 PM        MDM  Number of Diagnoses or Management Options  Bloating  Generalized abdominal pain  Diagnosis management comments: Patient is 41-year-old female who presents with abdominal cramping and distention with decreased bowel movements. She started new medication and these are common side effects. Work-up here is unrevealing. Labs without acute abnormality. CT abdomen and pelvis without acute abnormality. Has repeat benign abdominal exam.  Feeling better at this time. Discussed that she may need to discontinue this medication. We will treat with Bentyl. She is to call her doctor for close follow-up and return if worsening in any way. Does not have overwhelming amount of constipation on CT, however recommended stopping laxatives as it may worsen her cramping. Colace should be okay.     Risk of Complications, Morbidity, and/or Mortality  Presenting problems: moderate  Diagnostic procedures: moderate  Management options: moderate    Patient Progress  Patient progress: stable             Orders Placed This Encounter   Procedures    CT ABDOMEN PELVIS W IV CONTRAST Additional Contrast? None    CBC with Diff    CMP    Lipase    Urinalysis w rflx microscopic    Pregnancy, Urine    Diet NPO    Saline lock IV        Medications   0.9 % sodium chloride bolus (100 mLs IntraVENous New Bag 11/15/22 2120)   sodium chloride flush 0.9 % injection 10 mL (10 mLs IntraVENous Given 11/15/22 2120)   iopamidol (ISOVUE-370) 76 % injection 100 mL (100 mLs IntraVENous Given 11/15/22 2119)       New Prescriptions    DICYCLOMINE (BENTYL) 20 MG TABLET    Take 1 tablet by mouth 3 times daily as needed (abdominal cramping)        Sandra Tisha Morrison is a 28 y.o. female who presents to the Emergency Department with chief complaint of    Chief Complaint   Patient presents with    Abdominal Pain      Patient is a 40-year-old female with history of cholecystectomy,  who presents with diffuse abdominal pain. She says she typically has diarrhea but she started new medication 2 weeks ago Duke Rodriguez), and for the past 4 days has had constipation and bloating. She has been unable to have a complete bowel movement- has passed small hard stool balls. Has done enemas and laxatives at home without any improvement. Continues to bloat and have diffuse abdominal pain. She is passing gas. No fevers or vomiting. No dysuria frequency urgency. Denies possible pregnancy. Review of Systems   Constitutional: Negative. HENT: Negative. Respiratory: Negative. Cardiovascular: Negative. Gastrointestinal:  Positive for abdominal distention, abdominal pain and constipation. Genitourinary: Negative. All other systems reviewed and are negative.     Past Medical History:   Diagnosis Date    Abnormal weight gain 10/15/2015    Anxiety     Asthma     Chronic hypertension complicating or reason for care during pregnancy, second trimester 4/15/2020    Depression     Frequent headaches 10/15/2015    Gestational hypertension     Nicotine vapor product user     Postpartum depression     Striae purple 10/15/2015        Past Surgical History:   Procedure Laterality Date    BREAST BIOPSY Left 2021    US BX - negative (2 sites/markers)     SECTION  2020    CHOLECYSTECTOMY  2016    IR INJ EPI BLOOD OR CLOT PATCH  2020    US BREAST BIOPSY NEEDLE ADDITIONAL LEFT Left 2021    US BREAST BIOPSY NEEDLE ADDITIONAL LEFT 2021 [facility]    US BREAST NEEDLE BIOPSY LEFT Left 2021    US BREAST NEEDLE BIOPSY LEFT 2021 [facility]    WISDOM TOOTH EXTRACTION      16-14 years old        Family History   Problem Relation Age of Onset    Kidney Disease Mother         stones    Breast Cancer Mother 48    Diabetes Father     Osteoarthritis Father     Thyroid Disease Maternal Grandmother     Hypertension Neg Hx         Social History     Socioeconomic History    Marital status:      Spouse name: None    Number of children: None    Years of education: None    Highest education level: None   Tobacco Use    Smoking status: Every Day     Packs/day: 0.25     Types: Cigarettes    Smokeless tobacco: Never   Vaping Use    Vaping Use: Every day    Substances: Nicotine   Substance and Sexual Activity    Alcohol use: Not Currently     Alcohol/week: 0.0 standard drinks    Drug use: Never   Social History Narrative     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 3 dogs--husky, lab, and pit bull-pointer mix. Latex, Metronidazole, and Amoxicillin-pot clavulanate     Previous Medications    ALPRAZOLAM (XANAX) 2 MG TABLET    Take 2 mg by mouth daily as needed. BUSPIRONE (BUSPAR) 15 MG TABLET    Take 15 mg by mouth 2 times daily    CETIRIZINE (ZYRTEC) 10 MG TABLET    Take 10 mg by mouth daily    FEXOFENADINE (ALLEGRA) 60 MG TABLET    Take 60 mg by mouth 2 times daily    LEVOTHYROXINE (SYNTHROID) 50 MCG TABLET    Take by mouth every morning (before breakfast)    NORGESTIM-ETH ESTRAD TRIPHASIC 0.18/0.215/0.25 MG-35 MCG TABS    Take 1 tablet by mouth daily    SERTRALINE (ZOLOFT) 100 MG TABLET    TAKE 1 TABLET BY MOUTH EVERY DAY        Vitals signs and nursing note reviewed. Patient Vitals for the past 4 hrs:   Temp Pulse Resp BP SpO2   11/15/22 2208 -- 86 18 136/70 99 %   11/15/22 2017 98.6 °F (37 °C) 89 19 130/89 98 %          Physical Exam  Vitals and nursing note reviewed. Constitutional:       General: She is not in acute distress. Appearance: She is well-developed. She is obese. She is not ill-appearing or toxic-appearing. HENT:      Head: Normocephalic.    Eyes:      Extraocular Movements: Extraocular movements intact. Cardiovascular:      Rate and Rhythm: Normal rate and regular rhythm. Pulses: Normal pulses. Heart sounds: Normal heart sounds. Pulmonary:      Effort: Pulmonary effort is normal.      Breath sounds: Normal breath sounds. Abdominal:      General: Bowel sounds are normal. There is distension. Palpations: Abdomen is soft. Tenderness: There is generalized abdominal tenderness. There is no guarding or rebound. Musculoskeletal:         General: Normal range of motion. Cervical back: Normal range of motion and neck supple. Lymphadenopathy:      Cervical: No cervical adenopathy. Skin:     General: Skin is warm and dry. Findings: No rash. Neurological:      General: No focal deficit present. Mental Status: She is alert. Mental status is at baseline. Psychiatric:         Mood and Affect: Mood normal.         Behavior: Behavior normal.         Thought Content: Thought content normal.        Procedures    Results for orders placed or performed during the hospital encounter of 11/15/22   CT ABDOMEN PELVIS W IV CONTRAST Additional Contrast? None    Narrative    EXAMINATION: CT ABDOMEN PELVIS W IV CONTRAST 11/15/2022 9:27 PM    ACCESSION NUMBER: [id number]    COMPARISON: CT abdomen/pelvis 2/25/2019. INDICATION: Abdominal pain, bloating, constipation    TECHNIQUE: Contiguous axial computed tomographic images were obtained from the  domes of the diaphragm to the symphysis pubis following administration 100mL  Iso-taylor 370. Coronal reconstructions were also performed. Radiation dose reduction techniques were used for this study. Our CT scanners  use one or all of the following: Automated exposure control, adjustment of the  mA and/or kV according to patient size, iterative reconstruction. FINDINGS:  LUNG BASES: Visualized lung bases are clear. LIVER: The liver contour is normal. No suspicious liver lesion.     BILIARY TREE: Status post cholecystectomy. No biliary dilation. SPLEEN: Normal.    PANCREAS: No pancreatic mass or ductal dilation. ADRENALS: Normal.    KIDNEYS/BLADDER: The kidneys are symmetric in size. No renal calculus or  hydronephrosis. No renal mass. The urinary bladder is unremarkable. BOWEL: The colon is unremarkable. The small bowel is normal in caliber. No bowel  wall thickening. APPENDIX: The appendix is normal.    PERITONEUM/RETROPERITONEUM: No ascites or free air. No enlarged abdominal or  pelvic lymph nodes. VESSELS: The abdominal aorta and branch vessels are normal in caliber and  patent. Patent portal venous system. ABDOMINAL WALL: Tiny fat containing umbilical hernia. REPRODUCTIVE: Unremarkable for patient age. BONES: No suspicious osseous lesion. No acute osseous abnormality. Impression    No acute pathology within the abdomen or pelvis. Chronic/additional findings, as detailed above.        CBC with Diff   Result Value Ref Range    WBC 9.0 4.3 - 11.1 K/uL    RBC 4.73 4.05 - 5.20 M/uL    Hemoglobin 13.4 11.7 - 15.4 g/dL    Hematocrit 40.2 35.8 - 46.3 %    MCV 85.0 82.0 - 102.0 FL    MCH 28.3 26.1 - 32.9 PG    MCHC 33.3 31.4 - 35.0 g/dL    RDW 13.0 11.9 - 14.6 %    Platelets 287 570 - 832 K/uL    MPV 10.0 9.4 - 12.3 FL    nRBC 0.00 0.0 - 0.2 K/uL    Differential Type AUTOMATED      Seg Neutrophils 60 43 - 78 %    Lymphocytes 28 13 - 44 %    Monocytes 9 4.0 - 12.0 %    Eosinophils % 2 0.5 - 7.8 %    Basophils 0 0.0 - 2.0 %    Immature Granulocytes 0 0.0 - 5.0 %    Segs Absolute 5.5 1.7 - 8.2 K/UL    Absolute Lymph # 2.6 0.5 - 4.6 K/UL    Absolute Mono # 0.8 0.1 - 1.3 K/UL    Absolute Eos # 0.2 0.0 - 0.8 K/UL    Basophils Absolute 0.0 0.0 - 0.2 K/UL    Absolute Immature Granulocyte 0.0 0.0 - 0.5 K/UL   CMP   Result Value Ref Range    Sodium 138 133 - 143 mmol/L    Potassium 4.3 3.5 - 5.1 mmol/L    Chloride 101 98 - 107 mmol/L    CO2 26 21 - 32 mmol/L    Anion Gap 11 2 - 11 mmol/L Glucose 90 65 - 100 mg/dL    BUN 12 7.0 - 18.0 MG/DL    Creatinine 0.72 0.6 - 1.0 MG/DL    Est, Glom Filt Rate >60 >60 ml/min/1.73m2    Calcium 9.9 8.3 - 10.4 MG/DL    Total Bilirubin 0.2 0.2 - 1.1 MG/DL    ALT 17 13.0 - 61.0 U/L    AST 21 15 - 37 U/L    Alk Phosphatase 122 (H) 45.0 - 117.0 U/L    Total Protein 7.5 6.4 - 8.2 g/dL    Albumin 4.1 3.5 - 5.0 g/dL    Globulin 3.4 2.8 - 4.5 g/dL    Albumin/Globulin Ratio 1.2 0.4 - 1.6     Lipase   Result Value Ref Range    Lipase 19 13 - 60 U/L   Urinalysis w rflx microscopic   Result Value Ref Range    Color, UA YELLOW      Appearance CLEAR      Specific Gravity, UA 1.015 1.001 - 1.023      pH, Urine 5.5 5.0 - 9.0      Protein, UA Negative NEG mg/dL    Glucose, UA Negative mg/dL    Ketones, Urine Negative NEG mg/dL    Bilirubin Urine Negative NEG      Blood, Urine Negative NEG      Urobilinogen, Urine 0.2 0.2 - 1.0 EU/dL    Nitrite, Urine Negative NEG      Leukocyte Esterase, Urine Negative NEG     Pregnancy, Urine   Result Value Ref Range    HCG(Urine) Pregnancy Test Negative          CT ABDOMEN PELVIS W IV CONTRAST Additional Contrast? None   Final Result   No acute pathology within the abdomen or pelvis. Chronic/additional findings, as detailed above. Voice dictation software was used during the making of this note. This software is not perfect and grammatical and other typographical errors may be present. This note has not been completely proofread for errors.         Todd Khan  11/15/22 1026

## 2022-11-16 NOTE — ED TRIAGE NOTES
Patient presents with small BMs since Thursday. Patient normally has watery stools as her baseline with IBS. Patient started Mounjaro injections last Monday. Patient's PCP advised her she is constipated due to medication and to take laxative, drink lots of water, miralax, and colase which she did without relief of symptoms and without normal bowel movements. Patient has been having consistent abd pain today without any relief, is passing gas, and tried 2 fleet enemas without relief. Patient went to  who did not have Xray sos he came here.

## 2022-11-16 NOTE — DISCHARGE INSTRUCTIONS
OK to take 100mg docusate twice daily. Stop laxatives. Call your doctor for close follow up. Consider discontinuing Mounjaro. Return if worsening.

## 2022-11-16 NOTE — ED NOTES
I have reviewed discharge instructions with the patient. The patient verbalized understanding. Patient left ED via Discharge Method: ambulatory to Home with (family self). Opportunity for questions and clarification provided. Patient given 1 scripts. To continue your aftercare when you leave the hospital, you may receive an automated call from our care team to check in on how you are doing. This is a free service and part of our promise to provide the best care and service to meet your aftercare needs.  If you have questions, or wish to unsubscribe from this service please call [phone]. Thank you for Choosing our [facility] Emergency Department.        Domenica Fajardo RN  11/15/22 0754

## 2022-11-16 NOTE — ED NOTES
I have reviewed discharge instructions with the patient. The patient verbalized understanding. Patient left ED via Discharge Method: ambulatory to Home with significant other. Opportunity for questions and clarification provided. Patient given 1 scripts. To continue your aftercare when you leave the hospital, you may receive an automated call from our care team to check in on how you are doing. This is a free service and part of our promise to provide the best care and service to meet your aftercare needs.  If you have questions, or wish to unsubscribe from this service please call [phone]. Thank you for Choosing our [facility] Emergency Department.         Cachorro Ennis RN  11/15/22 6802

## 2023-01-20 DIAGNOSIS — Z30.011 ENCOUNTER FOR INITIAL PRESCRIPTION OF CONTRACEPTIVE PILLS: ICD-10-CM

## 2023-01-20 RX ORDER — NORGESTIMATE AND ETHINYL ESTRADIOL 7DAYSX3 28
KIT ORAL
Qty: 84 TABLET | Refills: 4 | OUTPATIENT
Start: 2023-01-20

## 2023-05-30 ENCOUNTER — OFFICE VISIT (OUTPATIENT)
Dept: OBGYN CLINIC | Age: 33
End: 2023-05-30
Payer: COMMERCIAL

## 2023-05-30 VITALS
DIASTOLIC BLOOD PRESSURE: 86 MMHG | BODY MASS INDEX: 45.99 KG/M2 | HEIGHT: 67 IN | WEIGHT: 293 LBS | SYSTOLIC BLOOD PRESSURE: 120 MMHG

## 2023-05-30 DIAGNOSIS — L73.2 HIDRADENITIS SUPPURATIVA: ICD-10-CM

## 2023-05-30 DIAGNOSIS — Z12.4 SCREENING FOR CERVICAL CANCER: ICD-10-CM

## 2023-05-30 DIAGNOSIS — E28.2 PCOS (POLYCYSTIC OVARIAN SYNDROME): ICD-10-CM

## 2023-05-30 DIAGNOSIS — Z01.419 ENCOUNTER FOR ANNUAL ROUTINE GYNECOLOGICAL EXAMINATION: Primary | ICD-10-CM

## 2023-05-30 PROCEDURE — 99395 PREV VISIT EST AGE 18-39: CPT | Performed by: OBSTETRICS & GYNECOLOGY

## 2023-05-30 PROCEDURE — 3078F DIAST BP <80 MM HG: CPT | Performed by: OBSTETRICS & GYNECOLOGY

## 2023-05-30 PROCEDURE — 3074F SYST BP LT 130 MM HG: CPT | Performed by: OBSTETRICS & GYNECOLOGY

## 2023-05-30 RX ORDER — CLINDAMYCIN PHOSPHATE 10 MG/G
GEL TOPICAL
Qty: 60 G | Refills: 3 | Status: SHIPPED | OUTPATIENT
Start: 2023-05-30 | End: 2023-06-06

## 2023-06-05 LAB
CYTOLOGIST CVX/VAG CYTO: NORMAL
CYTOLOGY CVX/VAG DOC THIN PREP: NORMAL
HPV APTIMA: NEGATIVE
HPV GENOTYPE REFLEX: NORMAL
Lab: NORMAL
Lab: NORMAL
PATH REPORT.FINAL DX SPEC: NORMAL
STAT OF ADQ CVX/VAG CYTO-IMP: NORMAL

## 2023-09-12 ENCOUNTER — HOSPITAL ENCOUNTER (EMERGENCY)
Age: 33
Discharge: HOME | End: 2023-09-12
Attending: EMERGENCY MEDICINE
Payer: COMMERCIAL

## 2023-09-12 VITALS
SYSTOLIC BLOOD PRESSURE: 132 MMHG | HEART RATE: 78 BPM | HEIGHT: 67 IN | OXYGEN SATURATION: 98 % | BODY MASS INDEX: 45.99 KG/M2 | RESPIRATION RATE: 19 BRPM | WEIGHT: 293 LBS | TEMPERATURE: 98 F | DIASTOLIC BLOOD PRESSURE: 79 MMHG

## 2023-09-12 DIAGNOSIS — J02.0 STREP PHARYNGITIS: Primary | ICD-10-CM

## 2023-09-12 LAB — HCG UR QL: NEGATIVE

## 2023-09-12 PROCEDURE — 2580000003 HC RX 258: Performed by: EMERGENCY MEDICINE

## 2023-09-12 PROCEDURE — 99284 EMERGENCY DEPT VISIT MOD MDM: CPT

## 2023-09-12 PROCEDURE — 6360000002 HC RX W HCPCS: Performed by: EMERGENCY MEDICINE

## 2023-09-12 PROCEDURE — 81025 URINE PREGNANCY TEST: CPT

## 2023-09-12 PROCEDURE — 96365 THER/PROPH/DIAG IV INF INIT: CPT

## 2023-09-12 PROCEDURE — 96375 TX/PRO/DX INJ NEW DRUG ADDON: CPT

## 2023-09-12 RX ORDER — MONTELUKAST SODIUM 10 MG/1
10 TABLET ORAL NIGHTLY
COMMUNITY

## 2023-09-12 RX ORDER — KETOROLAC TROMETHAMINE 30 MG/ML
30 INJECTION, SOLUTION INTRAMUSCULAR; INTRAVENOUS ONCE
Status: COMPLETED | OUTPATIENT
Start: 2023-09-12 | End: 2023-09-12

## 2023-09-12 RX ORDER — HYDROXYZINE HYDROCHLORIDE 25 MG/1
25 TABLET, FILM COATED ORAL 3 TIMES DAILY PRN
COMMUNITY

## 2023-09-12 RX ORDER — CLINDAMYCIN PHOSPHATE 900 MG/50ML
900 INJECTION, SOLUTION INTRAVENOUS
Status: COMPLETED | OUTPATIENT
Start: 2023-09-12 | End: 2023-09-12

## 2023-09-12 RX ORDER — 0.9 % SODIUM CHLORIDE 0.9 %
1000 INTRAVENOUS SOLUTION INTRAVENOUS
Status: COMPLETED | OUTPATIENT
Start: 2023-09-12 | End: 2023-09-12

## 2023-09-12 RX ORDER — DEXAMETHASONE SODIUM PHOSPHATE 10 MG/ML
10 INJECTION INTRAMUSCULAR; INTRAVENOUS
Status: COMPLETED | OUTPATIENT
Start: 2023-09-12 | End: 2023-09-12

## 2023-09-12 RX ADMIN — DEXAMETHASONE SODIUM PHOSPHATE 10 MG: 10 INJECTION INTRAMUSCULAR; INTRAVENOUS at 09:15

## 2023-09-12 RX ADMIN — CLINDAMYCIN PHOSPHATE 900 MG: 900 INJECTION, SOLUTION INTRAVENOUS at 09:15

## 2023-09-12 RX ADMIN — SODIUM CHLORIDE 1000 ML: 9 INJECTION, SOLUTION INTRAVENOUS at 09:15

## 2023-09-12 RX ADMIN — KETOROLAC TROMETHAMINE 30 MG: 30 INJECTION, SOLUTION INTRAMUSCULAR; INTRAVENOUS at 09:15

## 2023-09-12 ASSESSMENT — LIFESTYLE VARIABLES: HOW MANY STANDARD DRINKS CONTAINING ALCOHOL DO YOU HAVE ON A TYPICAL DAY: PATIENT DOES NOT DRINK

## 2023-09-12 ASSESSMENT — PAIN DESCRIPTION - LOCATION
LOCATION: THROAT

## 2023-09-12 ASSESSMENT — PAIN - FUNCTIONAL ASSESSMENT
PAIN_FUNCTIONAL_ASSESSMENT: 0-10
PAIN_FUNCTIONAL_ASSESSMENT: PREVENTS OR INTERFERES SOME ACTIVE ACTIVITIES AND ADLS

## 2023-09-12 ASSESSMENT — ENCOUNTER SYMPTOMS
RHINORRHEA: 0
COUGH: 0
DIARRHEA: 0
WHEEZING: 0
SINUS PAIN: 0
BACK PAIN: 0
TROUBLE SWALLOWING: 1
CONSTIPATION: 0
SHORTNESS OF BREATH: 0
NAUSEA: 0
ABDOMINAL PAIN: 0
VOMITING: 0
EYE PAIN: 0
EYE ITCHING: 0
EYE REDNESS: 0

## 2023-09-12 ASSESSMENT — PAIN DESCRIPTION - DESCRIPTORS: DESCRIPTORS: SHARP;SORE

## 2023-09-12 ASSESSMENT — PAIN SCALES - GENERAL
PAINLEVEL_OUTOF10: 8
PAINLEVEL_OUTOF10: 3
PAINLEVEL_OUTOF10: 8
PAINLEVEL_OUTOF10: 3

## 2023-09-12 ASSESSMENT — PAIN DESCRIPTION - PAIN TYPE: TYPE: ACUTE PAIN

## 2023-09-12 NOTE — ED TRIAGE NOTES
Pt states that she has been seen and treated x 2 for strep throat. Was PCN on the 1st virtual visit. Was seen and treated at Urgent care yesterday and given a steroid injection and started on Cleocin. Has taken 2 doses.   Comes here today stating that she is no better and having increased difficulty swallowing

## 2023-09-12 NOTE — DISCHARGE INSTRUCTIONS
Continue clindamycin  Continue methylprednisolone  Add Norco elixir for pain control  Continue ibuprofen/Motrin every 6 hours    Do NOT jessica Tylenol while taking the Norco elixir    Call your doctor or the follow up doctor to set up appointment for recheck visit    Return to ER for any worsening symptoms or new problems which may arise

## 2023-09-26 ENCOUNTER — OFFICE VISIT (OUTPATIENT)
Dept: ENT CLINIC | Age: 33
End: 2023-09-26
Payer: COMMERCIAL

## 2023-09-26 VITALS — RESPIRATION RATE: 18 BRPM | BODY MASS INDEX: 45.99 KG/M2 | WEIGHT: 293 LBS | HEIGHT: 67 IN

## 2023-09-26 DIAGNOSIS — J35.01 CHRONIC TONSILLITIS: Primary | ICD-10-CM

## 2023-09-26 PROCEDURE — 99244 OFF/OP CNSLTJ NEW/EST MOD 40: CPT | Performed by: OTOLARYNGOLOGY

## 2023-09-26 RX ORDER — LEVOTHYROXINE SODIUM 0.12 MG/1
TABLET ORAL
COMMUNITY
Start: 2023-07-05

## 2023-09-26 RX ORDER — ALPRAZOLAM 1 MG/1
1 TABLET ORAL 2 TIMES DAILY PRN
COMMUNITY
Start: 2023-09-07

## 2023-09-26 RX ORDER — METFORMIN HYDROCHLORIDE 500 MG/1
TABLET, EXTENDED RELEASE ORAL
COMMUNITY
Start: 2023-09-08

## 2023-09-26 ASSESSMENT — ENCOUNTER SYMPTOMS
SORE THROAT: 0
ABDOMINAL PAIN: 0
SHORTNESS OF BREATH: 0

## 2023-09-26 NOTE — PROGRESS NOTES
Open wound of abdomen    High-risk pregnancy in third trimester    Constipation during pregnancy in third trimester     Current Outpatient Medications   Medication Sig    ALPRAZolam (XANAX) 1 MG tablet Take 1 tablet by mouth 2 times daily as needed. metFORMIN (GLUCOPHAGE-XR) 500 MG extended release tablet TAKE 1 TABELT BY MOUTH WITH A MEAL ONCE DAILY FOR A WEEK THEN 1 TABLET 2 TIMES A DAY AS DIRECTED    levothyroxine (SYNTHROID) 125 MCG tablet TAKE 1 TABLET BY MOUTH EVERY DAY IN THE MORNING ON AN EMPTY STOMACH FOR 30 DAYS    hydrOXYzine HCl (ATARAX) 25 MG tablet Take 1 tablet by mouth 3 times daily as needed for Itching    montelukast (SINGULAIR) 10 MG tablet Take 1 tablet by mouth nightly    ALPRAZolam (XANAX) 2 MG tablet Take 1 tablet by mouth daily as needed. busPIRone (BUSPAR) 15 MG tablet Take 15 mg by mouth 2 times daily    cetirizine (ZYRTEC) 10 MG tablet Take 1 tablet by mouth daily    fexofenadine (ALLEGRA) 60 MG tablet Take 1 tablet by mouth 2 times daily    sertraline (ZOLOFT) 100 MG tablet TAKE 1 TABLET BY MOUTH EVERY DAY     No current facility-administered medications for this visit.      Past Medical History:   Diagnosis Date    Abnormal weight gain 10/15/2015    Anxiety     Asthma     Chronic hypertension complicating or reason for care during pregnancy, second trimester 4/15/2020    Depression     Frequent headaches 10/15/2015    Gestational hypertension     Nicotine vapor product user     Postpartum depression     Striae purple 10/15/2015     Social History     Tobacco Use    Smoking status: Every Day     Packs/day: .25     Types: Cigarettes    Smokeless tobacco: Never   Substance Use Topics    Alcohol use: Not Currently     Alcohol/week: 0.0 standard drinks of alcohol     Past Surgical History:   Procedure Laterality Date    BREAST BIOPSY Left 2021    US BX - negative (2 sites/markers)     SECTION  2020    CHOLECYSTECTOMY  2016    IR INJ EPI BLOOD OR CLOT PATCH

## 2023-10-28 ENCOUNTER — HOSPITAL ENCOUNTER (EMERGENCY)
Age: 33
Discharge: HOME | End: 2023-10-28
Attending: EMERGENCY MEDICINE
Payer: COMMERCIAL

## 2023-10-28 ENCOUNTER — APPOINTMENT (OUTPATIENT)
Dept: GENERAL RADIOLOGY | Age: 33
End: 2023-10-28
Payer: COMMERCIAL

## 2023-10-28 VITALS
SYSTOLIC BLOOD PRESSURE: 144 MMHG | DIASTOLIC BLOOD PRESSURE: 95 MMHG | BODY MASS INDEX: 45.99 KG/M2 | HEIGHT: 67 IN | HEART RATE: 92 BPM | RESPIRATION RATE: 18 BRPM | TEMPERATURE: 98 F | WEIGHT: 293 LBS | OXYGEN SATURATION: 96 %

## 2023-10-28 DIAGNOSIS — M77.9 TENDONITIS: Primary | ICD-10-CM

## 2023-10-28 DIAGNOSIS — M79.672 LEFT FOOT PAIN: ICD-10-CM

## 2023-10-28 PROCEDURE — 99284 EMERGENCY DEPT VISIT MOD MDM: CPT

## 2023-10-28 PROCEDURE — 6360000002 HC RX W HCPCS: Performed by: EMERGENCY MEDICINE

## 2023-10-28 PROCEDURE — 96372 THER/PROPH/DIAG INJ SC/IM: CPT

## 2023-10-28 PROCEDURE — 73630 X-RAY EXAM OF FOOT: CPT

## 2023-10-28 RX ORDER — DEXAMETHASONE SODIUM PHOSPHATE 10 MG/ML
8 INJECTION INTRAMUSCULAR; INTRAVENOUS
Status: COMPLETED | OUTPATIENT
Start: 2023-10-28 | End: 2023-10-28

## 2023-10-28 RX ORDER — DEXAMETHASONE 2 MG/1
TABLET ORAL
Qty: 42 TABLET | Refills: 0 | Status: SHIPPED | OUTPATIENT
Start: 2023-10-28 | End: 2023-11-08

## 2023-10-28 RX ADMIN — DEXAMETHASONE SODIUM PHOSPHATE 8 MG: 10 INJECTION INTRAMUSCULAR; INTRAVENOUS at 22:12

## 2023-10-28 ASSESSMENT — PAIN SCALES - GENERAL: PAINLEVEL_OUTOF10: 8

## 2023-10-28 ASSESSMENT — PAIN - FUNCTIONAL ASSESSMENT: PAIN_FUNCTIONAL_ASSESSMENT: 0-10

## 2023-10-28 ASSESSMENT — LIFESTYLE VARIABLES
HOW MANY STANDARD DRINKS CONTAINING ALCOHOL DO YOU HAVE ON A TYPICAL DAY: PATIENT DOES NOT DRINK
HOW OFTEN DO YOU HAVE A DRINK CONTAINING ALCOHOL: NEVER

## 2023-10-29 NOTE — ED TRIAGE NOTES
Pt ambulatory to ED with c/o left foot pain. States she hurt her foot a couple weeks ago, but unsure how. 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

## 2024-04-12 ENCOUNTER — OFFICE VISIT (OUTPATIENT)
Dept: ORTHOPEDIC SURGERY | Age: 34
End: 2024-04-12

## 2024-04-12 DIAGNOSIS — M76.51 PATELLAR TENDINITIS OF RIGHT KNEE: ICD-10-CM

## 2024-04-12 DIAGNOSIS — M25.561 RIGHT KNEE PAIN, UNSPECIFIED CHRONICITY: Primary | ICD-10-CM

## 2024-04-12 RX ORDER — ALBUTEROL SULFATE 2.5 MG/3ML
SOLUTION RESPIRATORY (INHALATION)
COMMUNITY
Start: 2024-02-23

## 2024-04-12 RX ORDER — IPRATROPIUM BROMIDE 42 UG/1
SPRAY, METERED NASAL
COMMUNITY
Start: 2024-02-23

## 2024-04-12 NOTE — PROGRESS NOTES
Name: Sandra Richardson  YOB: 1990  Gender: female  MRN: [id number]    CC: Right knee pain    HPI:   04/12/2024: Initial visit: Right chronic knee pain: Over 2-3 year duration: Pain increased after working as a     ROS/Meds/PSH/PMH/FH/SH: reviewed today    Tobacco:  reports that she has been smoking cigarettes. She has never used smokeless tobacco.     Physical Examination:  Patient appears to be alert and oriented with acceptable appearance.  No obvious distress or SOB  CV: appears to have acceptable vascular color and capillary refill  Neuro: appears to have mostly intact light touch sensation   Skin: Right = mild anterior knee soft tissue swelling  MS: Standing: Plantigrade: Gait full  Right = anterior knee pain inferior patella suspected patella tendon more than fat pad  Right = full knee motion; 5/5 strength; no instability or crepitance    XR: Right knee: Standing AP lateral and sunrise views taken today with no collapse appreciated; patellofemoral alignment appears near symmetrical  XR Impression:  As above      Reviewed Test/Records/Documents:   12/11/2018: Right ankle/foot injury  12/03/2019: Diagnosis: Right heel lateral malleolar fracture, osteochondral defect, anterior ankle ossicle/impingement  12/03/2019: MMI: More probable need for: Right ankle arthroscopy: 2% impairment RLE     Injection: Potential future    Assessment:    Right chronic anterior knee pain      Plan:   The patient and I discussed the above assessment. We explored treatment options.     She reports years of chronic anterior knee pain  Her anterior knee pain increased after working as a  logging > 15,000 steps in 4 hours  I am unable to determine if this is patellofemoral, Hoffa fat pad or patella tendinitis  Plan is MRI scan and reevaluation    Advanced medical imaging: Right knee MRI scan: Assess chronic anterior knee pain for Hoffa fat pad impingement syndrome versus patella

## 2024-04-26 ENCOUNTER — OFFICE VISIT (OUTPATIENT)
Dept: ORTHOPEDIC SURGERY | Age: 34
End: 2024-04-26

## 2024-04-26 DIAGNOSIS — M25.561 RIGHT KNEE PAIN, UNSPECIFIED CHRONICITY: Primary | ICD-10-CM

## 2024-04-26 NOTE — PROGRESS NOTES
Name: Sandra Richardson  YOB: 1990  Gender: female  MRN: [id number]    04/26/2024: MRI scan results    HPI:   04/12/2024: Initial visit: Right chronic knee pain: Over 2-3 year duration: Pain increased after working as a     ROS/Meds/PSH/PMH/FH/SH: reviewed today    Tobacco:  reports that she has been smoking cigarettes. She has never used smokeless tobacco.     Physical Examination:  Patient appears to be alert and oriented with acceptable appearance.  No obvious distress or SOB  CV: appears to have acceptable vascular color and capillary refill  Neuro: appears to have mostly intact light touch sensation   Skin: Right = mild anterior knee soft tissue swelling  MS: Standing: Plantigrade: Gait full  Right = anterior knee pain inferior patella   Right = full knee motion; 5/5 strength; no instability or crepitance    XR: Right knee: Standing AP lateral and sunrise views taken 04/12/2024 with no collapse appreciated; patellofemoral alignment appears near symmetrical  XR Impression:  As above      Reviewed Test/Records/Documents:   12/11/2018: Right ankle/foot injury  12/03/2019: Diagnosis: Right heel lateral malleolar fracture, osteochondral defect, anterior ankle ossicle/impingement  12/03/2019: MMI: More probable need for: Right ankle arthroscopy: 2% impairment RLE     04/20/2024: Right knee MRI scan without contrast: Radiology impression:  1.  ACL signal change could be related to recent strain  2.  No findings to suggest patellofemoral instability.  No Hoffa fat pad edema  3.  Femorotibial DJD within medial compartment chondromalacia and sclerosis of the tibial articular surface  4.  Small effusion  5.  Mild superficial and deep infrapatellar bursitis  Under findings sections: Radiologist reflects that the tendons including the extensor mechanism are intact    Injection: Potential future    Assessment:    Right chronic anterior knee pain      Plan:   The patient and I discussed the above

## 2024-04-29 ENCOUNTER — CLINICAL DOCUMENTATION (OUTPATIENT)
Dept: ORTHOPEDIC SURGERY | Age: 34
End: 2024-04-29

## 2024-04-29 NOTE — PROGRESS NOTES
Documentation support for knee brace:    Due to her MRI scan findings and inability to fit with a non-custom brace:  A customized brace is a matter medical necessity in order to:  Properly accommodate/fit her thigh/calf disparity  Properly support her anterior knee pain   Provide stability to her knee    This note was created using Dragon voice recognition software which may result in errors of speech and spelling recognition and word/phrase syntax errors.

## 2024-05-22 ENCOUNTER — OFFICE VISIT (OUTPATIENT)
Dept: ORTHOPEDIC SURGERY | Age: 34
End: 2024-05-22

## 2024-05-22 DIAGNOSIS — M76.51 PATELLAR TENDINITIS OF RIGHT KNEE: ICD-10-CM

## 2024-05-22 DIAGNOSIS — M25.561 RIGHT KNEE PAIN, UNSPECIFIED CHRONICITY: Primary | ICD-10-CM

## 2024-05-23 NOTE — PROGRESS NOTES
The patient was seen in the office today by Rio Gonzales with [facility]. Dr. Clemons prescribed and ordered an  brace for the patient's right knee. Rio was able to fit the patient today, and all questions were answered at the time of the fitting.     Patient read and signed documenting they understand and agree to [facility]'s current DME return policy.

## 2024-10-10 ENCOUNTER — HOSPITAL ENCOUNTER (EMERGENCY)
Age: 34
Discharge: HOME | End: 2024-10-10
Attending: EMERGENCY MEDICINE
Payer: COMMERCIAL

## 2024-10-10 VITALS
BODY MASS INDEX: 45.99 KG/M2 | RESPIRATION RATE: 22 BRPM | OXYGEN SATURATION: 95 % | WEIGHT: 293 LBS | TEMPERATURE: 97.9 F | HEIGHT: 67 IN | HEART RATE: 84 BPM | SYSTOLIC BLOOD PRESSURE: 144 MMHG | DIASTOLIC BLOOD PRESSURE: 83 MMHG

## 2024-10-10 DIAGNOSIS — M79.10 MYALGIA: Primary | ICD-10-CM

## 2024-10-10 DIAGNOSIS — J06.9 UPPER RESPIRATORY TRACT INFECTION, UNSPECIFIED TYPE: ICD-10-CM

## 2024-10-10 LAB
ANION GAP BLD CALC-SCNC: 13.4 MMOL/L
APPEARANCE UR: CLEAR
BACTERIA URNS QL MICRO: ABNORMAL /HPF
BASOPHILS # BLD: 0.1 K/UL (ref 0–0.2)
BASOPHILS NFR BLD: 1 % (ref 0–2)
BILIRUB UR QL: ABNORMAL
BUN BLD-MCNC: 11 MG/DL (ref 8–26)
CHLORIDE BLD-SCNC: 105 MMOL/L (ref 98–107)
CO2 BLD-SCNC: 22.6 MMOL/L (ref 21–32)
COLOR UR: YELLOW
CREAT BLD-MCNC: 0.62 MG/DL (ref 0.8–1.5)
DIFFERENTIAL METHOD BLD: NORMAL
EOSINOPHIL # BLD: 0.1 K/UL (ref 0–0.8)
EOSINOPHIL NFR BLD: 1 % (ref 0.5–7.8)
EPI CELLS #/AREA URNS HPF: ABNORMAL /HPF
ERYTHROCYTE [DISTWIDTH] IN BLOOD BY AUTOMATED COUNT: 12.6 % (ref 11.9–14.6)
GLUCOSE BLD-MCNC: 115 MG/DL (ref 65–100)
GLUCOSE UR STRIP.AUTO-MCNC: NEGATIVE MG/DL
HCG UR QL: NEGATIVE
HCT VFR BLD AUTO: 36.5 % (ref 35.8–46.3)
HGB BLD-MCNC: 12.1 G/DL (ref 11.7–15.4)
HGB UR QL STRIP: NEGATIVE
IMM GRANULOCYTES # BLD AUTO: 0 K/UL (ref 0–0.5)
IMM GRANULOCYTES NFR BLD AUTO: 0 % (ref 0–5)
KETONES UR QL STRIP.AUTO: 15 MG/DL
LEUKOCYTE ESTERASE UR QL STRIP.AUTO: NEGATIVE
LYMPHOCYTES # BLD: 2.2 K/UL (ref 0.5–4.6)
LYMPHOCYTES NFR BLD: 23 % (ref 13–44)
MCH RBC QN AUTO: 28.3 PG (ref 26.1–32.9)
MCHC RBC AUTO-ENTMCNC: 33.2 G/DL (ref 31.4–35)
MCV RBC AUTO: 85.3 FL (ref 82–102)
MONOCYTES # BLD: 1 K/UL (ref 0.1–1.3)
MONOCYTES NFR BLD: 10 % (ref 4–12)
MUCOUS THREADS URNS QL MICRO: ABNORMAL /LPF
NEUTS SEG # BLD: 6.3 K/UL (ref 1.7–8.2)
NEUTS SEG NFR BLD: 65 % (ref 43–78)
NITRITE UR QL STRIP.AUTO: NEGATIVE
NRBC # BLD: 0 K/UL (ref 0–0.2)
OTHER OBSERVATIONS: ABNORMAL
PH UR STRIP: 6 (ref 5–9)
PLATELET # BLD AUTO: 313 K/UL (ref 150–450)
PMV BLD AUTO: 10.2 FL (ref 9.4–12.3)
POTASSIUM BLD-SCNC: 3.4 MMOL/L (ref 3.5–5.1)
PROT UR STRIP-MCNC: ABNORMAL MG/DL
RBC # BLD AUTO: 4.28 M/UL (ref 4.05–5.2)
RBC #/AREA URNS HPF: ABNORMAL /HPF
SODIUM BLD-SCNC: 141 MMOL/L (ref 136–145)
SP GR UR REFRACTOMETRY: >=1.03 (ref 1–1.02)
UROBILINOGEN UR QL STRIP.AUTO: 0.2 EU/DL (ref 0.2–1)
WBC # BLD AUTO: 9.7 K/UL (ref 4.3–11.1)
WBC URNS QL MICRO: ABNORMAL /HPF

## 2024-10-10 PROCEDURE — 99283 EMERGENCY DEPT VISIT LOW MDM: CPT

## 2024-10-10 PROCEDURE — 81025 URINE PREGNANCY TEST: CPT

## 2024-10-10 PROCEDURE — 80047 BASIC METABLC PNL IONIZED CA: CPT

## 2024-10-10 PROCEDURE — 85025 COMPLETE CBC W/AUTO DIFF WBC: CPT

## 2024-10-10 PROCEDURE — 81001 URINALYSIS AUTO W/SCOPE: CPT

## 2024-10-10 ASSESSMENT — PAIN SCALES - GENERAL: PAINLEVEL_OUTOF10: 5

## 2024-10-10 ASSESSMENT — ENCOUNTER SYMPTOMS
CHEST TIGHTNESS: 0
EYE PAIN: 0
BACK PAIN: 1
TROUBLE SWALLOWING: 0
COUGH: 0
VOMITING: 0
FACIAL SWELLING: 0
NAUSEA: 0
VOICE CHANGE: 0
SHORTNESS OF BREATH: 0
ABDOMINAL PAIN: 0
SORE THROAT: 0

## 2024-10-10 ASSESSMENT — PAIN - FUNCTIONAL ASSESSMENT: PAIN_FUNCTIONAL_ASSESSMENT: 0-10

## 2024-10-10 NOTE — ED TRIAGE NOTES
Pt to ED with c/o back pain and fever x 4 days.  Also, c/o CP from coughing.  No urinary frequency or pain.

## 2024-10-10 NOTE — DISCHARGE INSTRUCTIONS
Please continue to alternate 800 mg ibuprofen, 4 hours later 1000 g of Tylenol.  Please return to the ER for any bowel or bladder incontinence or any other concerning issues.

## 2024-10-10 NOTE — ED PROVIDER NOTES
daily    HYDROXYZINE HCL (ATARAX) 25 MG TABLET    Take 1 tablet by mouth 3 times daily as needed for Itching    IPRATROPIUM (ATROVENT) 0.06 % NASAL SPRAY    USE 2 SPRAYS IN EACH NOSTRIL 4 TIMES DAILY FOR 7 DAYS    LEVOTHYROXINE (SYNTHROID) 125 MCG TABLET    TAKE 1 TABLET BY MOUTH EVERY DAY IN THE MORNING ON AN EMPTY STOMACH FOR 30 DAYS    METFORMIN (GLUCOPHAGE-XR) 500 MG EXTENDED RELEASE TABLET    TAKE 1 TABELT BY MOUTH WITH A MEAL ONCE DAILY FOR A WEEK THEN 1 TABLET 2 TIMES A DAY AS DIRECTED    MONTELUKAST (SINGULAIR) 10 MG TABLET    Take 1 tablet by mouth nightly    SERTRALINE (ZOLOFT) 100 MG TABLET    TAKE 1 TABLET BY MOUTH EVERY DAY        Results for orders placed or performed during the hospital encounter of 10/10/24   CBC with Auto Differential   Result Value Ref Range    WBC 9.7 4.3 - 11.1 K/uL    RBC 4.28 4.05 - 5.20 M/uL    Hemoglobin 12.1 11.7 - 15.4 g/dL    Hematocrit 36.5 35.8 - 46.3 %    MCV 85.3 82.0 - 102.0 FL    MCH 28.3 26.1 - 32.9 PG    MCHC 33.2 31.4 - 35.0 g/dL    RDW 12.6 11.9 - 14.6 %    Platelets 313 150 - 450 K/uL    MPV 10.2 9.4 - 12.3 FL    nRBC 0.00 0.0 - 0.2 K/uL    Differential Type AUTOMATED      Neutrophils % 65 43 - 78 %    Lymphocytes % 23 13 - 44 %    Monocytes % 10 4.0 - 12.0 %    Eosinophils % 1 0.5 - 7.8 %    Basophils % 1 0.0 - 2.0 %    Immature Granulocytes % 0 0.0 - 5.0 %    Neutrophils Absolute 6.3 1.7 - 8.2 K/UL    Lymphocytes Absolute 2.2 0.5 - 4.6 K/UL    Monocytes Absolute 1.0 0.1 - 1.3 K/UL    Eosinophils Absolute 0.1 0.0 - 0.8 K/UL    Basophils Absolute 0.1 0.0 - 0.2 K/UL    Immature Granulocytes Absolute 0.0 0.0 - 0.5 K/UL   Urinalysis   Result Value Ref Range    Color, UA YELLOW      Appearance CLEAR      Specific Gravity, UA >=1.030 1.001 - 1.023    pH, Urine 6.0 5.0 - 9.0      Protein, UA TRACE (A) NEG mg/dL    Glucose, Ur Negative NEG mg/dL    Ketones, Urine 15 (A) NEG mg/dL    Bilirubin, Urine SMALL (A) NEG      Blood, Urine Negative NEG      Urobilinogen, Urine

## 2025-04-02 ENCOUNTER — HOSPITAL ENCOUNTER (EMERGENCY)
Age: 35
Discharge: HOME | End: 2025-04-02
Payer: COMMERCIAL

## 2025-04-02 ENCOUNTER — APPOINTMENT (OUTPATIENT)
Dept: GENERAL RADIOLOGY | Age: 35
End: 2025-04-02
Payer: COMMERCIAL

## 2025-04-02 VITALS
WEIGHT: 293 LBS | HEART RATE: 89 BPM | RESPIRATION RATE: 19 BRPM | OXYGEN SATURATION: 98 % | TEMPERATURE: 98.8 F | SYSTOLIC BLOOD PRESSURE: 138 MMHG | BODY MASS INDEX: 45.99 KG/M2 | DIASTOLIC BLOOD PRESSURE: 77 MMHG | HEIGHT: 67 IN

## 2025-04-02 DIAGNOSIS — M54.50 LUMBAR BACK PAIN: ICD-10-CM

## 2025-04-02 DIAGNOSIS — M53.3 COCCYGEAL PAIN: ICD-10-CM
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
## 2025-04-02 LAB — HCG UR QL: NEGATIVE

## 2025-04-02 PROCEDURE — 81025 URINE PREGNANCY TEST: CPT

## 2025-04-02 PROCEDURE — 99284 EMERGENCY DEPT VISIT MOD MDM: CPT

## 2025-04-02 PROCEDURE — 72220 X-RAY EXAM SACRUM TAILBONE: CPT

## 2025-04-02 PROCEDURE — 6370000000 HC RX 637 (ALT 250 FOR IP)

## 2025-04-02 PROCEDURE — 72100 X-RAY EXAM L-S SPINE 2/3 VWS: CPT

## 2025-04-02 RX ORDER — ONDANSETRON 4 MG/1
4 TABLET, ORALLY DISINTEGRATING ORAL 3 TIMES DAILY PRN
Qty: 21 TABLET | Refills: 0 | Status: SHIPPED | OUTPATIENT
Start: 2025-04-02

## 2025-04-02 RX ORDER — HYDROCODONE BITARTRATE AND ACETAMINOPHEN 10; 325 MG/1; MG/1
1 TABLET ORAL EVERY 6 HOURS PRN
Qty: 12 TABLET | Refills: 0 | Status: SHIPPED | OUTPATIENT
Start: 2025-04-02 | End: 2025-04-05

## 2025-04-02 RX ORDER — ONDANSETRON 4 MG/1
4 TABLET, ORALLY DISINTEGRATING ORAL
Status: DISCONTINUED | OUTPATIENT
Start: 2025-04-02 | End: 2025-04-02 | Stop reason: HOSPADM

## 2025-04-02 RX ORDER — HYDROCODONE BITARTRATE AND ACETAMINOPHEN 10; 325 MG/1; MG/1
1 TABLET ORAL
Refills: 0 | Status: COMPLETED | OUTPATIENT
Start: 2025-04-02 | End: 2025-04-02

## 2025-04-02 RX ADMIN — HYDROCODONE BITARTRATE AND ACETAMINOPHEN 1 TABLET: 10; 325 TABLET ORAL at 18:43

## 2025-04-02 ASSESSMENT — PAIN SCALES - GENERAL
PAINLEVEL_OUTOF10: 7
PAINLEVEL_OUTOF10: 7

## 2025-04-02 ASSESSMENT — PAIN - FUNCTIONAL ASSESSMENT: PAIN_FUNCTIONAL_ASSESSMENT: 0-10

## 2025-04-02 ASSESSMENT — LIFESTYLE VARIABLES
HOW OFTEN DO YOU HAVE A DRINK CONTAINING ALCOHOL: NEVER
HOW MANY STANDARD DRINKS CONTAINING ALCOHOL DO YOU HAVE ON A TYPICAL DAY: PATIENT DOES NOT DRINK

## 2025-04-02 NOTE — ED PROVIDER NOTES
normal. No respiratory distress.   Musculoskeletal:      Cervical back: No tenderness. Normal range of motion.      Thoracic back: No tenderness. Normal range of motion.      Lumbar back: Tenderness present. No deformity. Normal range of motion.        Back:       Comments: Tender to palpation to the lower lumbar and sacral/coccyx region.  No overlying bruising or ecchymosis.  No saddle anesthesia.  5 out of 5 strength and sensation in bilateral lower extremities.   Neurological:      General: No focal deficit present.      Mental Status: She is alert and oriented to person, place, and time.      Sensory: No sensory deficit.      Motor: No weakness.      Gait: Gait normal.   Psychiatric:         Mood and Affect: Mood normal.         Behavior: Behavior normal.          Procedures     Procedures    Orders placed during this emergency department visit:     Orders Placed This Encounter   Procedures    XR LUMBAR SPINE (2-3 VIEWS)    XR SACRUM COCCYX (MIN 2 VIEWS)    Pregnancy, Urine        Medications given during this emergency department visit:     Medications   HYDROcodone-acetaminophen (NORCO)  MG per tablet 1 tablet (has no administration in time range)   ondansetron (ZOFRAN-ODT) disintegrating tablet 4 mg (has no administration in time range)       New prescriptions:     New Prescriptions    No medications on file        Past History and Complexity:     Past Medical History:   Diagnosis Date    Abnormal weight gain 10/15/2015    Anxiety     Asthma     Chronic hypertension complicating or reason for care during pregnancy, second trimester 4/15/2020    Depression     Frequent headaches 10/15/2015    Gestational hypertension     Nicotine vapor product user     Postpartum depression     Striae purple 10/15/2015        Past Surgical History:   Procedure Laterality Date    BREAST BIOPSY Left 2021    US BX - negative (2 sites/markers)     SECTION  2020    CHOLECYSTECTOMY  2016    IR INJ EPI BLOOD

## 2025-04-02 NOTE — ED TRIAGE NOTES
Pt ambulatory to triage with steady gait, significant other by side. PT reports falling from \"wheely chair\" last night and fell on buttocks. PT c/o tailbone pain, headache, and nausea from pain. PT reports having gone to urgent care and was sent here for \"xrays and ct\". PT was given zofran at urgent care.

## 2025-04-03 NOTE — ED NOTES
I have reviewed discharge instructions with the patient.  The patient verbalized understanding.    Patient left ED via Discharge Method: ambulatory to Home with .  Opportunity for questions and clarification provided.       Patient given 2 scripts. 1 Escribed to [facility] 1 given.

## 2025-04-03 NOTE — DISCHARGE INSTRUCTIONS
There was no sign of fracture on your x-rays.  Take the pain medication as needed.  Continue ibuprofen as well.  You can sit on a doughnut pillow to keep pressure off of your coccyx region.    Perform gentle range of motion and refrain from any heavy lifting or strenuous exercise.  Please follow-up with your primary care provider within the next week for reevaluation to ensure improvement in your symptoms    Return to the ED if you begin to experience any worsening pain, numbness or tingling in your groin, weakness in your legs, or any new or worsening symptoms.

## 2025-07-25 ENCOUNTER — HOSPITAL ENCOUNTER (EMERGENCY)
Age: 35
Discharge: HOME | End: 2025-07-25
Payer: COMMERCIAL

## 2025-07-25 ENCOUNTER — APPOINTMENT (OUTPATIENT)
Dept: CT IMAGING | Age: 35
End: 2025-07-25
Payer: COMMERCIAL

## 2025-07-25 VITALS
TEMPERATURE: 98 F | HEIGHT: 67 IN | WEIGHT: 293 LBS | OXYGEN SATURATION: 100 % | DIASTOLIC BLOOD PRESSURE: 85 MMHG | HEART RATE: 73 BPM | RESPIRATION RATE: 18 BRPM | SYSTOLIC BLOOD PRESSURE: 122 MMHG | BODY MASS INDEX: 45.99 KG/M2

## 2025-07-25 DIAGNOSIS — R10.9 ABDOMINAL PAIN, UNSPECIFIED ABDOMINAL LOCATION: Primary | ICD-10-CM

## 2025-07-25 LAB
ALBUMIN SERPL-MCNC: 4.3 G/DL (ref 3.5–5)
ALBUMIN/GLOB SERPL: 1.3 (ref 1–1.9)
ALP SERPL-CCNC: 115 U/L (ref 35–104)
ALT SERPL-CCNC: 32 U/L (ref 12–65)
ANION GAP SERPL CALC-SCNC: 14 MMOL/L (ref 7–16)
APPEARANCE UR: CLEAR
AST SERPL-CCNC: 34 U/L (ref 15–37)
BASOPHILS # BLD: 0.04 K/UL (ref 0–0.2)
BASOPHILS NFR BLD: 0.5 % (ref 0–2)
BILIRUB SERPL-MCNC: 0.3 MG/DL (ref 0–1.2)
BILIRUB UR QL: NEGATIVE
BUN SERPL-MCNC: 13 MG/DL (ref 6–23)
CALCIUM SERPL-MCNC: 9.3 MG/DL (ref 8.8–10.2)
CHLORIDE SERPL-SCNC: 101 MMOL/L (ref 98–107)
CO2 SERPL-SCNC: 23 MMOL/L (ref 20–29)
COLOR UR: YELLOW
CREAT SERPL-MCNC: 0.56 MG/DL (ref 0.8–1.3)
DIFFERENTIAL METHOD BLD: NORMAL
EOSINOPHIL # BLD: 0.12 K/UL (ref 0–0.8)
EOSINOPHIL NFR BLD: 1.4 % (ref 0.5–7.8)
ERYTHROCYTE [DISTWIDTH] IN BLOOD BY AUTOMATED COUNT: 12.5 % (ref 11.9–14.6)
GLOBULIN SER CALC-MCNC: 3.3 G/DL (ref 2.3–3.5)
GLUCOSE SERPL-MCNC: 87 MG/DL (ref 70–99)
GLUCOSE UR STRIP.AUTO-MCNC: NEGATIVE MG/DL
HCG SERPL-ACNC: <1 MIU/ML (ref 0–6)
HCT VFR BLD AUTO: 37.3 % (ref 35.8–46.3)
HGB BLD-MCNC: 12.5 G/DL (ref 11.7–15.4)
HGB UR QL STRIP: NEGATIVE
IMM GRANULOCYTES # BLD AUTO: 0.03 K/UL (ref 0–0.5)
IMM GRANULOCYTES NFR BLD AUTO: 0.4 % (ref 0–5)
KETONES UR QL STRIP.AUTO: NEGATIVE MG/DL
LEUKOCYTE ESTERASE UR QL STRIP.AUTO: NEGATIVE
LIPASE SERPL-CCNC: 20 U/L (ref 13–60)
LYMPHOCYTES # BLD: 3.37 K/UL (ref 0.5–4.6)
LYMPHOCYTES NFR BLD: 40.1 % (ref 13–44)
MCH RBC QN AUTO: 28.9 PG (ref 26.1–32.9)
MCHC RBC AUTO-ENTMCNC: 33.5 G/DL (ref 31.4–35)
MCV RBC AUTO: 86.1 FL (ref 82–102)
MONOCYTES # BLD: 0.87 K/UL (ref 0.1–1.3)
MONOCYTES NFR BLD: 10.3 % (ref 4–12)
NEUTS SEG # BLD: 3.98 K/UL (ref 1.7–8.2)
NEUTS SEG NFR BLD: 47.3 % (ref 43–78)
NITRITE UR QL STRIP.AUTO: NEGATIVE
NRBC # BLD: 0 K/UL (ref 0–0.2)
PH UR STRIP: 5.5 (ref 5–9)
PLATELET # BLD AUTO: 339 K/UL (ref 150–450)
PMV BLD AUTO: 10.8 FL (ref 9.4–12.3)
POTASSIUM SERPL-SCNC: 4.1 MMOL/L (ref 3.5–5.1)
PROT SERPL-MCNC: 7.6 G/DL (ref 6.3–8.2)
PROT UR STRIP-MCNC: NEGATIVE MG/DL
RBC # BLD AUTO: 4.33 M/UL (ref 4.05–5.2)
SODIUM SERPL-SCNC: 138 MMOL/L (ref 133–143)
SP GR UR REFRACTOMETRY: 1.01 (ref 1–1.02)
UROBILINOGEN UR QL STRIP.AUTO: 0.2 EU/DL (ref 0.2–1)
WBC # BLD AUTO: 8.4 K/UL (ref 4.3–11.1)

## 2025-07-25 PROCEDURE — 84702 CHORIONIC GONADOTROPIN TEST: CPT

## 2025-07-25 PROCEDURE — 96374 THER/PROPH/DIAG INJ IV PUSH: CPT

## 2025-07-25 PROCEDURE — 83690 ASSAY OF LIPASE: CPT

## 2025-07-25 PROCEDURE — 85025 COMPLETE CBC W/AUTO DIFF WBC: CPT

## 2025-07-25 PROCEDURE — 99285 EMERGENCY DEPT VISIT HI MDM: CPT

## 2025-07-25 PROCEDURE — 6360000004 HC RX CONTRAST MEDICATION: Performed by: PHYSICIAN ASSISTANT

## 2025-07-25 PROCEDURE — 2580000003 HC RX 258: Performed by: PHYSICIAN ASSISTANT

## 2025-07-25 PROCEDURE — 6360000002 HC RX W HCPCS: Performed by: PHYSICIAN ASSISTANT

## 2025-07-25 PROCEDURE — 80053 COMPREHEN METABOLIC PANEL: CPT

## 2025-07-25 PROCEDURE — 81003 URINALYSIS AUTO W/O SCOPE: CPT

## 2025-07-25 PROCEDURE — 74177 CT ABD & PELVIS W/CONTRAST: CPT

## 2025-07-25 RX ORDER — MORPHINE SULFATE 4 MG/ML
4 INJECTION, SOLUTION INTRAMUSCULAR; INTRAVENOUS
Refills: 0 | Status: COMPLETED | OUTPATIENT
Start: 2025-07-25 | End: 2025-07-25

## 2025-07-25 RX ORDER — 0.9 % SODIUM CHLORIDE 0.9 %
1000 INTRAVENOUS SOLUTION INTRAVENOUS ONCE
Status: COMPLETED | OUTPATIENT
Start: 2025-07-25 | End: 2025-07-25

## 2025-07-25 RX ORDER — IOPAMIDOL 755 MG/ML
100 INJECTION, SOLUTION INTRAVASCULAR
Status: COMPLETED | OUTPATIENT
Start: 2025-07-25 | End: 2025-07-25

## 2025-07-25 RX ADMIN — SODIUM CHLORIDE 1000 ML: 0.9 INJECTION, SOLUTION INTRAVENOUS at 19:40

## 2025-07-25 RX ADMIN — MORPHINE SULFATE 4 MG: 4 INJECTION INTRAVENOUS at 21:25

## 2025-07-25 RX ADMIN — IOPAMIDOL 100 ML: 755 INJECTION, SOLUTION INTRAVENOUS at 20:27

## 2025-07-25 ASSESSMENT — PAIN DESCRIPTION - LOCATION
LOCATION: ABDOMEN
LOCATION: ABDOMEN

## 2025-07-25 ASSESSMENT — PAIN DESCRIPTION - DESCRIPTORS: DESCRIPTORS: PRESSURE

## 2025-07-25 ASSESSMENT — PAIN SCALES - GENERAL
PAINLEVEL_OUTOF10: 2
PAINLEVEL_OUTOF10: 7
PAINLEVEL_OUTOF10: 5

## 2025-07-25 ASSESSMENT — PAIN - FUNCTIONAL ASSESSMENT: PAIN_FUNCTIONAL_ASSESSMENT: 0-10

## 2025-07-25 ASSESSMENT — PAIN DESCRIPTION - ORIENTATION: ORIENTATION: RIGHT;LEFT

## 2025-07-25 ASSESSMENT — PAIN DESCRIPTION - PAIN TYPE: TYPE: ACUTE PAIN

## 2025-07-25 NOTE — ED TRIAGE NOTES
Pt ambulatory to triage for reports of bilateral side pain that started Tuesday. Pt states she thought it could be related to menstrual cycle but states \"pressure\" pain got significantly worse. Pt states she went to urgent care & gave urine sample & was told to come here for further evaluation due to normal results. Pt denies hx of kidney stones. Pt denies nausea but reports decreased appetite.

## 2025-07-25 NOTE — ED PROVIDER NOTES
Emergency Department Provider Note       PCP: Jada Farnsworth, APRN - NP   Age: 34 y.o.   Sex: female     DISPOSITION Decision To Discharge 07/25/2025 09:56:47 PM   DISPOSITION CONDITION Stable            ICD-10-CM    1. Abdominal pain, unspecified abdominal location  R10.9           Medical Decision Making     34-year-old female presents to the ER with complaint of bilateral side pain started Tuesday.  Patient states the pain has been getting worse and describes it as pressure/achy.    Labs are reassuring.  CT abdomen and pelvis is negative for acute process.  There is no diverticulitis or bowel obstruction, normal appendix.  Patient has hepatic steatosis with hepatomegaly, borderline splenomegaly.    Discussed results with patient. Discussed follow up, return to ER for worsening symptoms or any other concerns. Patient verbalized understanding and agrees with plan.       1 acute complicated illness or injury.  Shared medical decision making was utilized in creating the patients health plan today.  I independently ordered and reviewed each unique test.    I reviewed external records: ED visit note from a different ED.   I reviewed external records: provider visit note from outside specialist.                     History     34-year-old female presents to the ER with complaint of bilateral side pain started Tuesday.  Patient states the pain has been getting worse and describes it as pressure/achy.  States she went to urgent care, they performed a urinalysis which was negative and told her to come here for further evaluation.  Patient states she had a low-grade fever today.  Patient denies nausea, vomiting, constipation.  Patient states she always has diarrhea.  Denies constipation, denies urinary symptoms    The history is provided by the patient.     Physical Exam     Vitals signs and nursing note reviewed:  Vitals:    07/25/25 1840 07/25/25 2125   BP: (!) 150/94 (!) 147/81   Pulse: 74 67   Resp: 19 21

## 2025-07-26 NOTE — PROGRESS NOTES
Patient mobility status ambulates with no difficulty.     I have reviewed discharge instructions with the patient and spouse.  The patient and spouse verbalized understanding.    Patient left ED via Discharge Method: ambulatory to Home with Spouse.    Opportunity for questions and clarification provided.     Patient given 0 scripts.

## 2025-07-26 NOTE — DISCHARGE INSTRUCTIONS
Drink at least 8 cups of water a day. Take Tylenol and Ibuprofen as needed for pain.    Call, make an appointment and follow up with your doctor in 2-3 days for a recheck.    Return to ER for worsening symptoms, chest pain, shortness of breath or any other concerns.

## (undated) DEVICE — PREMIUM WET SKIN PREP TRAY: Brand: MEDLINE INDUSTRIES, INC.

## (undated) DEVICE — STERILE POLYISOPRENE POWDER-FREE SURGICAL GLOVES: Brand: PROTEXIS

## (undated) DEVICE — SUTURE PLN GUT SZ 2-0 L27IN ABSRB YELLOWISH TAN L40MM CT 853H

## (undated) DEVICE — SOLUTION IRRIG 1000ML H2O STRL BLT

## (undated) DEVICE — SOLUTION IV 1000ML 0.9% SOD CHL

## (undated) DEVICE — SUTURE MCRYL SZ 3-0 L27IN ABSRB UD L60MM KS STR REV CUT Y523H

## (undated) DEVICE — SUTURE VCRL SZ 0 L36IN ABSRB UD L36MM CT-1 1/2 CIR J946H

## (undated) DEVICE — MEDI-VAC NON-CONDUCTIVE SUCTION TUBING: Brand: CARDINAL HEALTH

## (undated) DEVICE — Device: Brand: PORTEX

## (undated) DEVICE — PENCIL ES L3M BTTN SWCH S STL HEX LOK BLDE ELECTRD HOLSTER

## (undated) DEVICE — AMD ANTIMICROBIAL NON-ADHERENT PAD,0.2% POLYHEXAMETHYLENE BIGUANIDE HCI (PHMB): Brand: TELFA

## (undated) DEVICE — AMD ANTIMICROBIAL GAUZE SPONGES,12 PLY USP TYPE VII, 0.2% POLYHEXAMETHYLENE BIGUANIDE HCI (PHMB): Brand: CURITY

## (undated) DEVICE — SUTURE VCRL SZ 2-0 L36IN ABSRB VLT L36MM CT-1 1/2 CIR J345H

## (undated) DEVICE — TRAY CATH 16FR PVC INTMIT STR TIP PREATTACH TO 1000ML COLL

## (undated) DEVICE — SUT CHRMC 3-0 27IN SH BRN --

## (undated) DEVICE — REM POLYHESIVE ADULT PATIENT RETURN ELECTRODE: Brand: VALLEYLAB

## (undated) DEVICE — SURGICAL PROCEDURE PACK C SECT CDS

## (undated) DEVICE — KENDALL SCD EXPRESS SLEEVES, KNEE LENGTH, MEDIUM: Brand: KENDALL SCD